# Patient Record
Sex: FEMALE | Race: WHITE | NOT HISPANIC OR LATINO | Employment: FULL TIME | URBAN - METROPOLITAN AREA
[De-identification: names, ages, dates, MRNs, and addresses within clinical notes are randomized per-mention and may not be internally consistent; named-entity substitution may affect disease eponyms.]

---

## 2017-01-23 ENCOUNTER — OFFICE VISIT (OUTPATIENT)
Dept: OBGYN | Facility: CLINIC | Age: 37
End: 2017-01-23
Payer: COMMERCIAL

## 2017-01-23 VITALS
SYSTOLIC BLOOD PRESSURE: 97 MMHG | HEART RATE: 82 BPM | HEIGHT: 63 IN | WEIGHT: 146.6 LBS | OXYGEN SATURATION: 99 % | BODY MASS INDEX: 25.98 KG/M2 | DIASTOLIC BLOOD PRESSURE: 64 MMHG

## 2017-01-23 DIAGNOSIS — N87.1 MODERATE DYSPLASIA OF CERVIX (CIN II): ICD-10-CM

## 2017-01-23 DIAGNOSIS — Z01.42: ICD-10-CM

## 2017-01-23 DIAGNOSIS — N94.6 DYSMENORRHEA: ICD-10-CM

## 2017-01-23 DIAGNOSIS — D25.9 UTERINE LEIOMYOMA, UNSPECIFIED LOCATION: ICD-10-CM

## 2017-01-23 DIAGNOSIS — Z01.411 ENCOUNTER FOR GYNECOLOGICAL EXAMINATION WITH ABNORMAL FINDING: Primary | ICD-10-CM

## 2017-01-23 DIAGNOSIS — N93.9 ABNORMAL UTERINE BLEEDING: ICD-10-CM

## 2017-01-23 PROCEDURE — 88175 CYTOPATH C/V AUTO FLUID REDO: CPT | Performed by: OBSTETRICS & GYNECOLOGY

## 2017-01-23 PROCEDURE — 99395 PREV VISIT EST AGE 18-39: CPT | Mod: 25 | Performed by: OBSTETRICS & GYNECOLOGY

## 2017-01-23 PROCEDURE — 58100 BIOPSY OF UTERUS LINING: CPT | Performed by: OBSTETRICS & GYNECOLOGY

## 2017-01-23 PROCEDURE — 88305 TISSUE EXAM BY PATHOLOGIST: CPT | Performed by: OBSTETRICS & GYNECOLOGY

## 2017-01-23 PROCEDURE — 87624 HPV HI-RISK TYP POOLED RSLT: CPT | Performed by: OBSTETRICS & GYNECOLOGY

## 2017-01-23 RX ORDER — NAPROXEN 500 MG/1
500 TABLET ORAL 2 TIMES DAILY PRN
Qty: 60 TABLET | Refills: 2 | Status: SHIPPED | OUTPATIENT
Start: 2017-01-23 | End: 2019-02-12

## 2017-01-23 NOTE — MR AVS SNAPSHOT
After Visit Summary   1/23/2017    Ethel Murillo    MRN: [id number]           Patient Information     Date Of Birth          1980        Visit Information        Provider Department      1/23/2017 8:30 AM Nico Cottrell MD [facility]        Today's Diagnoses     Encounter for gynecological examination with abnormal finding    -  1     Abnormal uterine bleeding         Uterine leiomyoma, unspecified location         Papanicolaou smear to confirm recent normal smear following initial abnormal smear         Moderate dysplasia of cervix (ROSAURA II)         Dysmenorrhea            Follow-ups after your visit        Who to contact     If you have questions or need follow up information about today's clinic visit or your schedule please contact [facility] directly at [phone].  Normal or non-critical lab and imaging results will be communicated to you by Soufunhart, letter or phone within 4 business days after the clinic has received the results. If you do not hear from us within 7 days, please contact the clinic through Soufunhart or phone. If you have a critical or abnormal lab result, we will notify you by phone as soon as possible.  Submit refill requests through Giveo or call your pharmacy and they will forward the refill request to us. Please allow 3 business days for your refill to be completed.          Additional Information About Your Visit        MyChart Information     Giveo gives you secure access to your electronic health record. If you see a primary care provider, you can also send messages to your care team and make appointments. If you have questions, please call your primary care clinic.  If you do not have a primary care provider, please call [phone] and they will assist you.        Care EveryWhere ID     This is your Care EveryWhere ID. This could be used by other organizations to access your [facility] medical records  [id number]        Your  "Vitals Were     Pulse Height BMI (Body Mass Index) Pulse Oximetry Last Period Breastfeeding?    82 5' 3.1\" (1.603 m) 25.88 kg/m2 99% 01/12/2017 No       Blood Pressure from Last 3 Encounters:   01/23/17 97/64   12/06/16 104/66   10/26/16 108/62    Weight from Last 3 Encounters:   01/23/17 146 lb 9.6 oz (66.497 kg)   12/06/16 145 lb (65.772 kg)   10/26/16 149 lb (67.586 kg)              We Performed the Following     ENDOMETRIAL BIOPSY W/O CERVICAL DILATION     HPV High Risk Types DNA Cervical     Pap imaged thin layer diagnostic with HPV (select HPV order below)     Surgical pathology exam          Where to get your medicines      These medications were sent to [id number] - [place], [place] - [address]  [address] [address]     Phone:  [phone]    - naproxen 500 MG tablet       Primary Care Provider Office Phone # Fax #    Nico Cottrell [phone]       [address] [address]        Thank you!     Thank you for choosing [facility]  for your care. Our goal is always to provide you with excellent care. Hearing back from our patients is one way we can continue to improve our services. Please take a few minutes to complete the written survey that you may receive in the mail after your visit with us. Thank you!             Your Updated Medication List - Protect others around you: Learn how to safely use, store and throw away your medicines at www.disposemymeds.org.          This list is accurate as of: 1/23/17  9:04 AM.  Always use your most recent med list.                   Brand Name Dispense Instructions for use    naproxen 500 MG tablet    NAPROSYN    60 tablet    Take 1 tablet (500 mg) by mouth 2 times daily as needed         "

## 2017-01-23 NOTE — PROGRESS NOTES
Ethel Murillo is a 36 year old female , who presents for annual exam.   No incontinence, or unusual discharge.   For the past several months, she has had some abnormal bleeding.  She will have her menses for 5 days and then 5 days of brownish discharge.  She has history of uterine leiomyoma.    They may consider [place]itro fertilization in [place] as the cost is too high here.  She might also address the fibroids in [place] also.    Last cholesterol:   Recent Labs   Lab Test  13   0757   CHOL  199   HDL  41*   LDL  141*   TRIG  87   CHOLHDLRATIO  4.9     Past Medical History   Diagnosis Date     Uterine fibroid 2012     LSIL (low grade squamous intraepithelial lesion) on Pap smear 2012     colp 2012 - ROSAURA 1/2     Eczema      of vulva     Cervical high risk HPV (human papillomavirus) test positive 2013, 2015     type 31, HR HPV     Infertility      H/O colposcopy with cervical biopsy 2015     Bx & ECC - Negative       Past Surgical History   Procedure Laterality Date     Myomectomy uterus  8/3/2011     2.5 cm/ 4.5 cm/ 6.5 and 7.5 cm  surgery performed in [place].       Laser co2 vulva  2011     small condyloma       Cryotherapy, cervical  2012       Obstetric History       T0      TAB0   SAB0   E0   M0   L0           Gyn History:  Gynecological History         Patient's last menstrual period was 2017.     STD HPV/no PID/no IUD      history of abnormal pap smear:  yes  Last pap: PAP      NIL   2016        Current Outpatient Prescriptions   Medication Sig Dispense Refill     naproxen (NAPROSYN) 500 MG tablet Take 1 tablet (500 mg) by mouth 2 times daily as needed 60 tablet 1       No Known Allergies    Social History     Social History     Marital Status:      Spouse Name: N/A     Number of Children: 0     Years of Education: N/A     Occupational History      Unemployed     Social History Main Topics     Smoking status: Never Smoker      Smokeless tobacco:  "Never Used     Alcohol Use: No     Drug Use: No     Sexual Activity:     Partners: Male     Other Topics Concern     Not on file     Social History Narrative       Family History   Problem Relation Age of Onset     Hypertension Mother      Asthma Mother      Hypertension Father          ROS:  All negative except as above.      EXAM:  BP 97/64 mmHg  Pulse 82  Ht 5' 3.1\" (1.603 m)  Wt 146 lb 9.6 oz (66.497 kg)  BMI 25.88 kg/m2  SpO2 99%  LMP 01/12/2017  Breastfeeding? No  General:  WNWD female, NAD  Alert  Oriented x 3  Gait:  Normal  Skin:  Normal skin turgor  Neurologic:  CN grossly intact, good sensation.    HEENT:  NC/AT, EOMI  Neck:  No masses palpated, symmetrical, carotids +2/4, no bruits heard  Heart:  RRR  Lungs:  Clear   Breasts:  Symmetrical, no dimpling noted, no masses palpated, no discharge expressed  Abdomen:  Non-tender, non-distended.  Vulva: No external lesions, normal hair distribution, no adenopathy  BUS:  Normal, no masses noted  Urethra:  No hypermobility noted  Urethral meatus:  No masses noted  Vagina: Moist, pink, no abnormal discharge, well rugated, no lesions  Cervix: Smooth, pink, no visible lesions  Uterus: 14 week size, irregular, non-tender, mobile  Ovaries: No mass, non-tender, mobile  Perianal:  No masses noted.   Extremities:  No clubbing, cyanosis, or edema      ASSESSMENT/PLAN   Annual examination   Uterine leiomyoma   Abnormal uterine bleeding:  Plan for EMB  History of ROSAURA II.  Last pap normal.  Repeat pap and HPV today.  If normal, then may return to routine screening.   Low fat diet, weight bearing exercises and self breast exams on a monthly basis have been recommended.  I have discussed with patient the risks, benefits, medications, treatment options and modalities.   I have instructed the patient to call or schedule a follow-up appointment if any problems.    Nico Cottrell MD        PROCEDURE NOTE:  The procedure was reviewed with patient.  After consenting to the " procedure she was placed into the dorsal lithotomy position.  The examination was performed.  The speculum was placed into the vagina.  The cervix was prepped with Betadine. The uterus was sounded to 10 cm.  The Pipelle was used to sample the endometrium.    Instruments were removed and the specimen was sent to pathology.  Results to the patient in 1-2 weeks when they are returned.    Nico Cottrell MD

## 2017-01-23 NOTE — NURSING NOTE
"Chief Complaint   Patient presents with     Physical     Annual Female       Initial BP 97/64 mmHg  Pulse 82  Ht 5' 3.1\" (1.603 m)  Wt 146 lb 9.6 oz (66.497 kg)  BMI 25.88 kg/m2  SpO2 99%  LMP 01/12/2017  Breastfeeding? No Estimated body mass index is 25.88 kg/(m^2) as calculated from the following:    Height as of this encounter: 5' 3.1\" (1.603 m).    Weight as of this encounter: 146 lb 9.6 oz (66.497 kg).  BP completed using cuff size: melinda Burr MA 1/23/2017         "

## 2017-01-24 LAB — COPATH REPORT: NORMAL

## 2017-01-25 LAB
COPATH REPORT: NORMAL
PAP: NORMAL

## 2017-01-26 LAB
FINAL DIAGNOSIS: NORMAL
HPV HR 12 DNA CVX QL NAA+PROBE: NEGATIVE
HPV16 DNA SPEC QL NAA+PROBE: NEGATIVE
HPV18 DNA SPEC QL NAA+PROBE: NEGATIVE
SPECIMEN DESCRIPTION: NORMAL

## 2017-06-14 ENCOUNTER — OFFICE VISIT (OUTPATIENT)
Dept: OBGYN | Facility: CLINIC | Age: 37
End: 2017-06-14
Payer: COMMERCIAL

## 2017-06-14 VITALS
BODY MASS INDEX: 24.97 KG/M2 | WEIGHT: 141.4 LBS | OXYGEN SATURATION: 100 % | SYSTOLIC BLOOD PRESSURE: 106 MMHG | HEART RATE: 85 BPM | DIASTOLIC BLOOD PRESSURE: 67 MMHG

## 2017-06-14 DIAGNOSIS — R14.0 ABDOMINAL BLOATING: ICD-10-CM

## 2017-06-14 DIAGNOSIS — D25.9 UTERINE LEIOMYOMA, UNSPECIFIED LOCATION: Primary | ICD-10-CM

## 2017-06-14 PROCEDURE — 99214 OFFICE O/P EST MOD 30 MIN: CPT | Performed by: OBSTETRICS & GYNECOLOGY

## 2017-06-14 NOTE — MR AVS SNAPSHOT
After Visit Summary   6/14/2017    Ethel Murillo    MRN: [id number]           Patient Information     Date Of Birth          1980        Visit Information        Provider Department      6/14/2017 1:00 PM Nico Cottrell MD [facility]        Today's Diagnoses     Uterine leiomyoma, unspecified location    -  1    Abdominal bloating           Follow-ups after your visit        Your next 10 appointments already scheduled     Jun 22, 2017  9:00 AM CDT   US PELVIC COMPLETE W TRANSVAGINAL with FKUS1   [facility] ([facility])    [address] [place] [id number] [phone]           Please bring a list of your medicines (including vitamins, minerals and over-the-counter drugs). Also, tell your doctor about any allergies you may have. Wear comfortable clothes and leave your valuables at home.  Adults: Drink six 8-ounce glasses of fluid one hour before your exam. Do NOT empty your bladder.  If you need to empty your bladder before your exam, try to release only a little bit of urine. Then, drink another 8oz glass of fluid.  Children: Children who are potty trained should drink at least 4 cups (32 oz) of liquid 45 minutes to one hour prior to the exam. The child s bladder must be full in order to achieve a diagnostic exam. If your child is very uncomfortable or has an urgent need to pee, please notify a technologist; they will try to find out how much longer the wait may be and provide instructions to help relieve the pressure. Occasionally it is medically necessary to insert a urinary catheter to fill the bladder.  Please call the Imaging Department at your exam site with any questions.            Jun 27, 2017  9:15 AM CDT   Office Visit with MD [facility] ([facility])    [address] [place] [id number] [phone]           Bring a current list of meds and any  records pertaining to this visit.  For Physicals, please bring immunization records and any forms needing to be filled out.  Please arrive 10 minutes early to complete paperwork.              Future tests that were ordered for you today     Open Future Orders        Priority Expected Expires Ordered    US Pelvic Complete with Transvaginal Routine  6/14/2018 6/14/2017            Who to contact     If you have questions or need follow up information about today's clinic visit or your schedule please contact [facility] directly at [phone].  Normal or non-critical lab and imaging results will be communicated to you by Easy Bill Onlinehart, letter or phone within 4 business days after the clinic has received the results. If you do not hear from us within 7 days, please contact the clinic through GENELINKt or phone. If you have a critical or abnormal lab result, we will notify you by phone as soon as possible.  Submit refill requests through [email or web address] or call your pharmacy and they will forward the refill request to us. Please allow 3 business days for your refill to be completed.          Additional Information About Your Visit        Women.com Information     [email or web address] gives you secure access to your electronic health record. If you see a primary care provider, you can also send messages to your care team and make appointments. If you have questions, please call your primary care clinic.  If you do not have a primary care provider, please call [phone] and they will assist you.        Care EveryWhere ID     This is your Care EveryWhere ID. This could be used by other organizations to access your [facility] medical records  [id number]        Your Vitals Were     Pulse Last Period Pulse Oximetry BMI (Body Mass Index)          85 05/23/2017 (Exact Date) 100% 24.97 kg/m2         Blood Pressure from Last 3 Encounters:   06/14/17 106/67   01/23/17 97/64   12/06/16 104/66    Weight from Last 3 Encounters:   06/14/17 141 lb  6.4 oz (64.1 kg)   01/23/17 146 lb 9.6 oz (66.5 kg)   12/06/16 145 lb (65.8 kg)               Primary Care Provider Office Phone # Fax #    Nico Girish Cottrell [phone]       [address]  [facility] [address]        Thank you!     Thank you for choosing [facility]  for your care. Our goal is always to provide you with excellent care. Hearing back from our patients is one way we can continue to improve our services. Please take a few minutes to complete the written survey that you may receive in the mail after your visit with us. Thank you!             Your Updated Medication List - Protect others around you: Learn how to safely use, store and throw away your medicines at www.disposemymeds.org.          This list is accurate as of: 6/14/17  1:53 PM.  Always use your most recent med list.                   Brand Name Dispense Instructions for use    naproxen 500 MG tablet    NAPROSYN    60 tablet    Take 1 tablet (500 mg) by mouth 2 times daily as needed

## 2017-06-14 NOTE — PROGRESS NOTES
Ethel Murillo is a 36 year old female , who presents today with known fibroids.  She reports that she has had 3-4 months of bloating sensation and abdominal firmness noted.  She states that she used to have the bloating only before her period.  She also notes that at night, her abdomen is firm and hard when she presses on the abdomen.  She has noted bladder sensation when she presses onto the firmness in the upper abdomen.  Previously she had some firmness that resolved when she had a BM, now it is consistently firm.  She states that 4 months ago she had intercourse and the next day she had bloating sensation and hard stomach.      We reviewed the past ultrasound report and the films:    PELVIC ULTRASOUND WITH ENDOVAGINAL TRANSDUCER IMAGING   2016 9:50 AM   HISTORY: Leiomyoma of uterus, unspecified.  TECHNIQUE:  Endovaginal sonography was added to the transabdominal exam to better evaluate the uterus and ovaries.  COMPARISON: Pelvic ultrasound 2014.  FINDINGS: Endometrium is 9 mm thick. Uterus measures 12.4 x 8.5 x 10.8 cm. Multiple uterine fibers again identified. These are difficult to correlate with the prior exam. Anterior right uterine fibroid is 4.5 x 4.8 x 3.9 cm. Anterior right uterine fibroid is 3.1 x 3.2 x 3.2 cm. Posterior left uterine fibroid is 4.8 x 4.9 x 4.9 cm. This fibroid is the largest of the measured fibroids, previously the largest fibroid in this region measured up to 4.5 cm. Anterior left uterine fibroid is 3.3 x 3.6 x 3.3 cm. The left ovary appears normal. The right ovary contains a 1.4 cm cystic lesion. There is trace pelvic fluid.  IMPRESSION:  1. Multiple uterine fibroids again identified. A posterior left uterine fibroid may be slightly larger compared to the prior study, and the other fibroids are not substantially changed.  2. 1.4 cm small cystic lesion in the right ovary.          Past Medical History:   Diagnosis Date     Cervical high risk HPV (human papillomavirus)  test positive 11/2013, 1/2015    type 31, HR HPV     Eczema     of vulva     H/O colposcopy with cervical biopsy 2/2015    Bx & ECC - Negative     Infertility      LSIL (low grade squamous intraepithelial lesion) on Pap smear 11/2012    colp 11/2012 - ROSAURA 1/2     Uterine fibroid 11/2012       Past Surgical History:   Procedure Laterality Date     CRYOTHERAPY, CERVICAL  12/2012     LASER CO2 VULVA  12/2011    small condyloma       MYOMECTOMY UTERUS  8/3/2011    2.5 cm/ 4.5 cm/ 6.5 and 7.5 cm  surgery performed in [place].          No Known Allergies      Current Outpatient Prescriptions   Medication Sig Dispense Refill     naproxen (NAPROSYN) 500 MG tablet Take 1 tablet (500 mg) by mouth 2 times daily as needed 60 tablet 2       Social History     Social History     Marital status:      Spouse name: N/A     Number of children: 0     Years of education: N/A     Occupational History      Unemployed     Social History Main Topics     Smoking status: Never Smoker     Smokeless tobacco: Never Used     Alcohol use No     Drug use: No     Sexual activity: Yes     Partners: Male     Other Topics Concern     Not on file     Social History Narrative       Family History   Problem Relation Age of Onset     Hypertension Mother      Asthma Mother      Hypertension Father        Review of Systems:  10 point ROS of systems including Constitutional, Eyes, Respiratory, Cardiovascular, Gastroenterology, Genitourinary, Integumentary, Muscularskeletal, Psychiatric were all negative except for pertinent positives noted in my HPI and in the PMH.      Exam  /67 (BP Location: Right arm, Cuff Size: Adult Regular)  Pulse 85  Wt 141 lb 6.4 oz (64.1 kg)  LMP 05/23/2017 (Exact Date)  SpO2 100%  BMI 24.97 kg/m2  General:  WNWD female, NAD  Alert  Oriented x 3  Gait:  Normal  Skin:  Normal skin turgor  HEENT:  NC/AT, EOMI  Abdomen:  Non-tender, non-distended.  Vulva: No external lesions, normal hair distribution, no  adenopathy  BUS:  Normal, no masses noted  Urethra:  No hypermobility seen  Urethral meatus:  No masses noted  Vagina: Moist, pink, no abnormal discharge, well rugated, no lesions  Cervix: Smooth, pink, no visible lesions  Uterus: 18-20 week size, irregular, non-tender, mobile  Ovaries: Not palpable due to the uterine size.   Perianal:  No masses noted.  External hemorrhoid seen.  Extremities:  No clubbing, no cyanosis and no edema.      Assessment  Uterine leiomyoma  Bloating sensation       Plan  She has a history of uterine leiomyoma and also prior surgery for these.  When I saw her for her annual exam she had a uterine size of about 14 weeks.  At this appointment, the size has clearly increased.    The uterine size is likely related to the fibroids.  However, with the bloating sensation now occurring through out the cycle, it would be best to attempt to rule out other causes, such as ovarian issues.  With the size of the uterus and fibroids, the ultrasound might not be able to visualize the ovaries completely.    We reviewed the CA-125 and the goals and limitations of the blood test.  It is not a screening test for ovarian cancer, and that there are currently no good screening tests for ovarian cancer.  Discussed that many different conditions can elevate CA-125 including but not limited to colon or liver pathology or thyroid disease. A  result might be difficult to interpret due to the known fibroids and that fibroids may cause an elevation in the . Discussed that some ovarian cancers do not have an elevated CA-125.  Reviewed signs/symptoms of ovary cancer, although they are often vague, can be bloating, pressure, or bladder/bowel changes.  Patient voiced understanding.  Will obtain an ultrasound for further initial evaluation.    Return to clinic for further discussion after the ultrasound.     Nico Cottrell MD

## 2017-06-22 ENCOUNTER — RADIANT APPOINTMENT (OUTPATIENT)
Dept: ULTRASOUND IMAGING | Facility: CLINIC | Age: 37
End: 2017-06-22
Attending: OBSTETRICS & GYNECOLOGY
Payer: COMMERCIAL

## 2017-06-22 DIAGNOSIS — D25.9 UTERINE LEIOMYOMA, UNSPECIFIED LOCATION: ICD-10-CM

## 2017-06-22 DIAGNOSIS — R14.0 ABDOMINAL BLOATING: ICD-10-CM

## 2017-06-22 PROCEDURE — 76830 TRANSVAGINAL US NON-OB: CPT

## 2017-06-22 PROCEDURE — 76856 US EXAM PELVIC COMPLETE: CPT

## 2017-06-27 ENCOUNTER — TELEPHONE (OUTPATIENT)
Dept: OBGYN | Facility: CLINIC | Age: 37
End: 2017-06-27

## 2017-06-27 ENCOUNTER — OFFICE VISIT (OUTPATIENT)
Dept: OBGYN | Facility: CLINIC | Age: 37
End: 2017-06-27
Payer: COMMERCIAL

## 2017-06-27 VITALS
WEIGHT: 143.6 LBS | OXYGEN SATURATION: 100 % | DIASTOLIC BLOOD PRESSURE: 65 MMHG | BODY MASS INDEX: 25.36 KG/M2 | HEART RATE: 76 BPM | SYSTOLIC BLOOD PRESSURE: 104 MMHG

## 2017-06-27 DIAGNOSIS — D25.2 INTRAMURAL, SUBMUCOUS, AND SUBSEROUS LEIOMYOMA OF UTERUS: Primary | ICD-10-CM

## 2017-06-27 DIAGNOSIS — D25.1 INTRAMURAL, SUBMUCOUS, AND SUBSEROUS LEIOMYOMA OF UTERUS: Primary | ICD-10-CM

## 2017-06-27 DIAGNOSIS — D25.0 INTRAMURAL, SUBMUCOUS, AND SUBSEROUS LEIOMYOMA OF UTERUS: Primary | ICD-10-CM

## 2017-06-27 PROCEDURE — 99214 OFFICE O/P EST MOD 30 MIN: CPT | Performed by: OBSTETRICS & GYNECOLOGY

## 2017-06-27 NOTE — TELEPHONE ENCOUNTER
Surgery Pre-Certification    Medical Record Number: [id number]  Ethel Murillo  YOB: 1980   Phone: [phone] (home)   Primary Provider: Nico Cottrell    Reason for Admit:  Surgery admit     Surgeon: ELDA Cottrell MD  Surgical Procedure: Myomectomy  ICD-9 Coded: N94.6,D25.9  Date of Surgery: 7/14/17  Consent signed? No    Date signed:   Hospital: [facility]  Inpatient- Length of stay:  2 days.    Requestor:  Martha Alarcon     Location:  [facility] [phone]    Surgery packet given to patient. She will schedule pre op physical. 2 week post op scheduled.  Pre cert done surgery form e-mailed

## 2017-06-27 NOTE — PROGRESS NOTES
Ethel is a 36 year old  here for follow up of ultrasound.  She has a history of uterine leiomyoma and also infertility.  She had been planning to return to [facility] for further infertility management (cost is less).   Last month I saw patient for abdominal distention and she was concerned about the uterine leiomyoma enlarging.  She had an 18-20 week size uterus on the exam.  She had abnormal bleeding previously and the EMB in 2017, and disordered proliferative endometrium was noted.     The patient and I reviewed the following ultrasound report and we reviewed the ultrasound films.  We compared these with the ultrasound exam in 2016.     ULTRASOUND PELVIC COMPLETE WITH TRANSVAGINAL 2017 9:37 AM   HISTORY: Leiomyoma of uterus, unspecified. Abdominal distension (gaseous).   FINDINGS: Transvaginal images were performed to better evaluate the patient's uterus, ovaries and endometrial stripe.  The uterus is enlarged and lobulated measuring 17.2 x 8.4 x 14.2 cm. Numerous uterine fibroids are present. The largest is in the anterior left uterine body measuring 9.2 x 6.7 x 6.4 cm. Another is in the lower uterine segment possibly in the region of the cephalad portion  of the cervix measuring 5.2 x 4.6 x 3.6 cm. Many of these fibroids appear to be submucosal in location and distort the endometrium. Endometrial stripe measures 14 mm and is thickened for patient's age.  The right ovary is not visualized. The left ovary is not visualized.  No adnexal masses are present. No free pelvic fluid is present.  IMPRESSION: Multiple uterine fibroids distorting the endometrial stripe which is mildly thickened. This endometrial thickening may be related to the multiple submucosal fibroids.        Past Medical History:   Diagnosis Date     Cervical high risk HPV (human papillomavirus) test positive 2013, 2015    type 31, HR HPV     Eczema     of vulva     H/O colposcopy with cervical biopsy 2015    Bx & ECC - Negative      Infertility      LSIL (low grade squamous intraepithelial lesion) on Pap smear 11/2012    colp 11/2012 - ROSAURA 1/2     Uterine fibroid 11/2012       Past Surgical History:   Procedure Laterality Date     CRYOTHERAPY, CERVICAL  12/2012     LASER CO2 VULVA  12/2011    small condyloma       MYOMECTOMY UTERUS  8/3/2011    2.5 cm/ 4.5 cm/ 6.5 and 7.5 cm  surgery performed in [place].          No Known Allergies    Current Outpatient Prescriptions   Medication Sig Dispense Refill     naproxen (NAPROSYN) 500 MG tablet Take 1 tablet (500 mg) by mouth 2 times daily as needed (Patient not taking: Reported on 6/27/2017) 60 tablet 2       Social History     Social History     Marital status:      Spouse name: N/A     Number of children: 0     Years of education: N/A     Occupational History      Unemployed     Social History Main Topics     Smoking status: Never Smoker     Smokeless tobacco: Never Used     Alcohol use No     Drug use: No     Sexual activity: Yes     Partners: Male     Other Topics Concern     Not on file     Social History Narrative       Family History   Problem Relation Age of Onset     Hypertension Mother      Asthma Mother      Hypertension Father        Review of Systems:  10 point ROS of systems including Constitutional, Eyes, Respiratory, Cardiovascular, Gastroenterology, Genitourinary, Integumentary, Muscularskeletal, Psychiatric were all negative except for pertinent positives noted in my HPI and in the PMH.      Exam  /65 (BP Location: Right arm, Cuff Size: Adult Regular)  Pulse 76  Wt 143 lb 9.6 oz (65.1 kg)  LMP 06/18/2017 (Exact Date)  SpO2 100%  Breastfeeding? No  BMI 25.36 kg/m2  General:  WNWD female, NAD  Alert  Oriented x 3  Gait:  Normal  Skin:  Normal skin turgor  HEENT:  NC/AT, EOMI  Lungs:  Good respiratory effort   Pelvic exam:  Not performed today.   Extremities:  No clubbing, no cyanosis and no edema.      Assessment  Uterine leiomyoma, symptomatic       Plan  The  patient and I reviewed the options.  She would like to conserve the uterus for pregnancy, if possible.  We discussed the surgical approaches.  I do not do the robotic surgeries and if she would like to have that, a referral will be placed.  Due to the size of the uterus and the number of fibroids, the uterus might not be able to be placed back together.  She voices that her surgeon in [facility] had not removed all the fibroids as she would have had difficulty with conserving the uterus.    We reviewed the use of Lupron and Zoladex prior to the myomectomy and the goals and limitations.    Future pregnancy and IVF discussed.  MFM here would like to have 2 years between pregnancies or uterine surgeries.  This is limited for her however, as she has the advancing age to consider.    Questions seem to be answered..     TT 30 min  CT greater than 50%    Nico Cottrell MD

## 2017-06-27 NOTE — NURSING NOTE
"Chief Complaint   Patient presents with     RECHECK     follow up after US       Initial /65 (BP Location: Right arm, Cuff Size: Adult Regular)  Pulse 76  Wt 143 lb 9.6 oz (65.1 kg)  LMP 06/18/2017 (Exact Date)  SpO2 100%  Breastfeeding? No  BMI 25.36 kg/m2 Estimated body mass index is 25.36 kg/(m^2) as calculated from the following:    Height as of 1/23/17: 5' 3.1\" (1.603 m).    Weight as of this encounter: 143 lb 9.6 oz (65.1 kg).  Medication Reconciliation: complete   JUANJOSE Burr 6/27/2017         "

## 2017-06-29 ENCOUNTER — TELEPHONE (OUTPATIENT)
Dept: OBGYN | Facility: CLINIC | Age: 37
End: 2017-06-29

## 2017-06-29 NOTE — TELEPHONE ENCOUNTER
Alma Rosa from [facility] surgery scheduling called stating patient was scheduled to have surgery on 07-14-17 at 1500 and wanted to move surgery up to an earlier time.   Message handled by Nurse Triage with [facility] - provider name: Dr. Cottrell.  Move surgery up to 1200  Return call to Alma Rosa and explain Dr. Cottrell would like surgery moved up to 1200. Scheduled changed at [facility].   Updated Dr. Cottrell's calendar. Blocked Dr. Cottrell's clinic schedule per his advise. Emailed to alert staffing changes.     Phone call to patient. Explained change in surgery time. Patient agreed to change. Advised NPO after [place] and arrive at [facility] at 1030. Patient agreed to follow plan. Montse Watkins RN, BAN

## 2017-07-05 ENCOUNTER — TELEPHONE (OUTPATIENT)
Dept: OBGYN | Facility: CLINIC | Age: 37
End: 2017-07-05

## 2017-07-05 NOTE — TELEPHONE ENCOUNTER
"Return call to patient. She stated she wants to try a pill \"for both good or bad tumors\" that she will get from her [facility] in [facility]. Patient stated the medicine is made from the root of a plant and mixed with honey. Patient stated it is recommened she take one tab for 15 days. The Latin name of that plant is arum sairaulatum. She stated she is waiting for shipment. Patient apologized for cancelling surgery and appts.     Will route to Dr. Cottrell prior to cancelling appts and surgery. Montse Watkins RN, BAN      "

## 2017-07-05 NOTE — TELEPHONE ENCOUNTER
Reason for Call:  Other appointment    Detailed comments:  Patient calling. She received a medication from [place] that she wants to try first for a while. Please cancel her appointments and surgery for now. She will call back to reschedule if needed.     Phone Number Patient can be reached at: Home number on file [phone] (home)    Best Time:   Any     Can we leave a detailed message on this number? YES    Call taken on 7/5/2017 at 10:52 AM by Salina Mayers

## 2017-07-06 NOTE — TELEPHONE ENCOUNTER
"Noted patient gave permission to leave a message.   Left a detailed message that clinic has cancelled her appts and surgery as requested. Explained that Dr. Cottrell does not recommend she take the medicine as he researched it and \"It appears all parts of the plant is poisonous.\" Recommended she call back to clinic if we can assist her further. Montse Watkins RN, BAN      "

## 2017-11-28 ENCOUNTER — OFFICE VISIT (OUTPATIENT)
Dept: INTERNAL MEDICINE | Facility: CLINIC | Age: 37
End: 2017-11-28
Payer: COMMERCIAL

## 2017-11-28 ENCOUNTER — TELEPHONE (OUTPATIENT)
Dept: INTERNAL MEDICINE | Facility: CLINIC | Age: 37
End: 2017-11-28

## 2017-11-28 ENCOUNTER — RADIANT APPOINTMENT (OUTPATIENT)
Dept: GENERAL RADIOLOGY | Facility: CLINIC | Age: 37
End: 2017-11-28
Attending: INTERNAL MEDICINE
Payer: COMMERCIAL

## 2017-11-28 VITALS
HEIGHT: 63 IN | TEMPERATURE: 97.9 F | OXYGEN SATURATION: 97 % | HEART RATE: 90 BPM | DIASTOLIC BLOOD PRESSURE: 60 MMHG | WEIGHT: 140 LBS | BODY MASS INDEX: 24.8 KG/M2 | SYSTOLIC BLOOD PRESSURE: 108 MMHG

## 2017-11-28 DIAGNOSIS — R05.9 COUGH IN ADULT PATIENT: ICD-10-CM

## 2017-11-28 DIAGNOSIS — Z23 NEED FOR PROPHYLACTIC VACCINATION AND INOCULATION AGAINST INFLUENZA: ICD-10-CM

## 2017-11-28 DIAGNOSIS — D50.0 IRON DEFICIENCY ANEMIA DUE TO CHRONIC BLOOD LOSS: Primary | ICD-10-CM

## 2017-11-28 DIAGNOSIS — D50.0 IRON DEFICIENCY ANEMIA DUE TO CHRONIC BLOOD LOSS: ICD-10-CM

## 2017-11-28 DIAGNOSIS — Z00.00 ROUTINE GENERAL MEDICAL EXAMINATION AT A HEALTH CARE FACILITY: Primary | ICD-10-CM

## 2017-11-28 DIAGNOSIS — Z13.6 CARDIOVASCULAR SCREENING; LDL GOAL LESS THAN 160: ICD-10-CM

## 2017-11-28 DIAGNOSIS — Z23 NEED FOR PROPHYLACTIC VACCINATION WITH TETANUS-DIPHTHERIA (TD): ICD-10-CM

## 2017-11-28 LAB
ALBUMIN SERPL-MCNC: 3.7 G/DL (ref 3.4–5)
ALP SERPL-CCNC: 53 U/L (ref 40–150)
ALT SERPL W P-5'-P-CCNC: 28 U/L (ref 0–50)
ANION GAP SERPL CALCULATED.3IONS-SCNC: 5 MMOL/L (ref 3–14)
AST SERPL W P-5'-P-CCNC: 19 U/L (ref 0–45)
BASOPHILS # BLD AUTO: 0 10E9/L (ref 0–0.2)
BASOPHILS NFR BLD AUTO: 0.2 %
BILIRUB SERPL-MCNC: 0.6 MG/DL (ref 0.2–1.3)
BUN SERPL-MCNC: 11 MG/DL (ref 7–30)
CALCIUM SERPL-MCNC: 8.4 MG/DL (ref 8.5–10.1)
CHLORIDE SERPL-SCNC: 107 MMOL/L (ref 94–109)
CHOLEST SERPL-MCNC: 181 MG/DL
CO2 SERPL-SCNC: 27 MMOL/L (ref 20–32)
CREAT SERPL-MCNC: 0.61 MG/DL (ref 0.52–1.04)
CRP SERPL-MCNC: 11.2 MG/L (ref 0–8)
DIFFERENTIAL METHOD BLD: ABNORMAL
EOSINOPHIL # BLD AUTO: 0.1 10E9/L (ref 0–0.7)
EOSINOPHIL NFR BLD AUTO: 2.3 %
ERYTHROCYTE [DISTWIDTH] IN BLOOD BY AUTOMATED COUNT: 19.5 % (ref 10–15)
ERYTHROCYTE [SEDIMENTATION RATE] IN BLOOD BY WESTERGREN METHOD: 65 MM/H (ref 0–20)
GFR SERPL CREATININE-BSD FRML MDRD: >90 ML/MIN/1.7M2
GLUCOSE SERPL-MCNC: 83 MG/DL (ref 70–99)
HCT VFR BLD AUTO: 24.1 % (ref 35–47)
HDLC SERPL-MCNC: 49 MG/DL
HGB BLD-MCNC: 6.8 G/DL (ref 11.7–15.7)
LDLC SERPL CALC-MCNC: 119 MG/DL
LYMPHOCYTES # BLD AUTO: 0.8 10E9/L (ref 0.8–5.3)
LYMPHOCYTES NFR BLD AUTO: 17.5 %
MCH RBC QN AUTO: 20.1 PG (ref 26.5–33)
MCHC RBC AUTO-ENTMCNC: 28.2 G/DL (ref 31.5–36.5)
MCV RBC AUTO: 71 FL (ref 78–100)
MONOCYTES # BLD AUTO: 0.7 10E9/L (ref 0–1.3)
MONOCYTES NFR BLD AUTO: 14.7 %
NEUTROPHILS # BLD AUTO: 3.1 10E9/L (ref 1.6–8.3)
NEUTROPHILS NFR BLD AUTO: 65.3 %
NONHDLC SERPL-MCNC: 132 MG/DL
PLATELET # BLD AUTO: 408 10E9/L (ref 150–450)
POTASSIUM SERPL-SCNC: 4.1 MMOL/L (ref 3.4–5.3)
PROT SERPL-MCNC: 8 G/DL (ref 6.8–8.8)
RBC # BLD AUTO: 3.38 10E12/L (ref 3.8–5.2)
SODIUM SERPL-SCNC: 139 MMOL/L (ref 133–144)
TRIGL SERPL-MCNC: 64 MG/DL
WBC # BLD AUTO: 4.7 10E9/L (ref 4–11)

## 2017-11-28 PROCEDURE — 80053 COMPREHEN METABOLIC PANEL: CPT | Performed by: INTERNAL MEDICINE

## 2017-11-28 PROCEDURE — 99213 OFFICE O/P EST LOW 20 MIN: CPT | Mod: 25 | Performed by: INTERNAL MEDICINE

## 2017-11-28 PROCEDURE — 36415 COLL VENOUS BLD VENIPUNCTURE: CPT | Performed by: INTERNAL MEDICINE

## 2017-11-28 PROCEDURE — 80061 LIPID PANEL: CPT | Performed by: INTERNAL MEDICINE

## 2017-11-28 PROCEDURE — 85652 RBC SED RATE AUTOMATED: CPT | Performed by: INTERNAL MEDICINE

## 2017-11-28 PROCEDURE — 71020 XR CHEST 2 VW: CPT

## 2017-11-28 PROCEDURE — 85025 COMPLETE CBC W/AUTO DIFF WBC: CPT | Performed by: INTERNAL MEDICINE

## 2017-11-28 PROCEDURE — 99395 PREV VISIT EST AGE 18-39: CPT | Performed by: INTERNAL MEDICINE

## 2017-11-28 PROCEDURE — 86140 C-REACTIVE PROTEIN: CPT | Performed by: INTERNAL MEDICINE

## 2017-11-28 RX ORDER — METHYLPREDNISOLONE 4 MG
TABLET, DOSE PACK ORAL
Qty: 21 TABLET | Refills: 0 | Status: SHIPPED | OUTPATIENT
Start: 2017-11-28 | End: 2018-02-13

## 2017-11-28 ASSESSMENT — PAIN SCALES - GENERAL: PAINLEVEL: NO PAIN (0)

## 2017-11-28 NOTE — TELEPHONE ENCOUNTER
Patient called back and was updated on results  She handed the phone to her  to be updated on the same  She agreed to get blood transfusion.  Orders singed     Will keep encounter open in case orders are not placed correctly and [facility] infusion center calls back to get clarification    Carmelo Aaron RN

## 2017-11-28 NOTE — NURSING NOTE
"Chief Complaint   Patient presents with     Physical     Health Maintenance     Tetnanus     Cough     x2 month after cold, dry cough with almost vomiting        Initial /60  Pulse 90  Temp 97.9  F (36.6  C) (Oral)  Ht 5' 3.1\" (1.603 m)  Wt 140 lb (63.5 kg)  LMP 11/21/2017  SpO2 97%  BMI 24.72 kg/m2 Estimated body mass index is 24.72 kg/(m^2) as calculated from the following:    Height as of this encounter: 5' 3.1\" (1.603 m).    Weight as of this encounter: 140 lb (63.5 kg).  Medication Reconciliation: complete     Domenica Brooks MA       "

## 2017-11-28 NOTE — MR AVS SNAPSHOT
After Visit Summary   11/28/2017    Ethel Murillo    MRN: [id number]           Patient Information     Date Of Birth          1980        Visit Information        Provider Department      11/28/2017 8:50 AM Cedric Patel MD [facility]        Today's Diagnoses     Routine general medical examination at a health care facility    -  1    Need for prophylactic vaccination and inoculation against influenza        Need for prophylactic vaccination with tetanus-diphtheria (TD)        Cough in adult patient        CARDIOVASCULAR SCREENING; LDL GOAL LESS THAN 160          Care Instructions    - I will follow up with you about lab and imaging results.     - If coughing does not resolve with this treatment, let me know.    [facility]    If you have any questions regarding to your visit please contact your care team:     Team Pink:   Clinic Hours Telephone Number   Internal Medicine:  Dr. Clarisse Webb NP       7am-7pm  Monday - Thursday   7am-5pm  Fridays  [phone]  (Appointment scheduling available 24/7)    Questions about your visit?  Team Line  [phone]   Urgent Care - [place] - 11am-9pm Monday-Friday Saturday-Sunday- 9am-5pm   [place] - 5pm-9pm Monday-Friday Saturday-Sunday- 9am-5pm  [phone] - [facility]   [phone] - [place]       What options do I have for visits at the clinic other than the traditional office visit?  To expand how we care for you, many of our providers are utilizing electronic visits (e-visits) and telephone visits, when medically appropriate, for interactions with their patients rather than a visit in the clinic.   We also offer nurse visits for many medical concerns. Just like any other service, we will bill your insurance company for this type of visit based on time spent on the phone with your provider. Not all insurance companies cover these visits. Please check  with your medical insurance if this type of visit is covered. You will be responsible for any charges that are not paid by your insurance.      E-visits via [employer]:  generally incur a $35.00 fee.  Telephone visits:  Time spent on the phone: *charged based on time that is spent on the phone in increments of 10 minutes. Estimated cost:   5-10 mins $30.00   11-20 mins. $59.00   21-30 mins. $85.00   Use [employer] (secure email communication and access to your chart) to send your primary care provider a message or make an appointment. Ask someone on your Team how to sign up for [employer].    For a Price Quote for your services, please call our [place] Line at [phone].    As always, Thank you for trusting us with your health care needs!    Discharged by Ely NGUYEN CMA ([facility])            Follow-ups after your visit        Who to contact     If you have questions or need follow up information about today's clinic visit or your schedule please contact [facility] directly at [phone].  Normal or non-critical lab and imaging results will be communicated to you by [employer], letter or phone within 4 business days after the clinic has received the results. If you do not hear from us within 7 days, please contact the clinic through [employer] or phone. If you have a critical or abnormal lab result, we will notify you by phone as soon as possible.  Submit refill requests through [employer] or call your pharmacy and they will forward the refill request to us. Please allow 3 business days for your refill to be completed.          Additional Information About Your Visit        [employer] Information     [employer] gives you secure access to your electronic health record. If you see a primary care provider, you can also send messages to your care team and make appointments. If you have questions, please call your primary care clinic.  If you do not have a primary care provider, please call [phone] and they will assist  "you.        Care EveryWhere ID     This is your Care EveryWhere ID. This could be used by other organizations to access your [facility] medical records  [id number]        Your Vitals Were     Pulse Temperature Height Last Period Pulse Oximetry BMI (Body Mass Index)    90 97.9  F (36.6  C) (Oral) 5' 3.1\" (1.603 m) 11/21/2017 97% 24.72 kg/m2       Blood Pressure from Last 3 Encounters:   11/28/17 108/60   06/27/17 104/65   06/14/17 106/67    Weight from Last 3 Encounters:   11/28/17 140 lb (63.5 kg)   06/27/17 143 lb 9.6 oz (65.1 kg)   06/14/17 141 lb 6.4 oz (64.1 kg)              We Performed the Following     CBC with platelets differential     Comprehensive metabolic panel     CRP inflammation     Erythrocyte sedimentation rate auto     Lipid panel reflex to direct LDL Fasting          Today's Medication Changes          These changes are accurate as of: 11/28/17 10:00 AM.  If you have any questions, ask your nurse or doctor.               Start taking these medicines.        Dose/Directions    methylPREDNISolone 4 MG tablet   Commonly known as:  MEDROL DOSEPAK   Used for:  Cough in adult patient   Started by:  Cedric Patel MD        Follow package instructions   Quantity:  21 tablet   Refills:  0            Where to get your medicines      These medications were sent to [id number] - [place], [place] - [address]  [address] [address]     Phone:  [phone]     methylPREDNISolone 4 MG tablet                Primary Care Provider Office Phone # Fax #    Nico Girish Cottrell [phone] [address] [address]        Equal Access to Services     KAREEN WHARTON AH: Hadrodney Bhardwaj, waaxda luqadaha, qaybta kaalmagloria garcia. So [facility] [phone].    ATENCIÓN: Si habla español, tiene a howe disposición servicios gratuitos de asistencia lingüística. Llame al [phone].    We comply " with applicable federal civil rights laws and [place] laws. We do not discriminate on the basis of race, color, national origin, age, disability, sex, sexual orientation, or gender identity.            Thank you!     Thank you for choosing [facility]  for your care. Our goal is always to provide you with excellent care. Hearing back from our patients is one way we can continue to improve our services. Please take a few minutes to complete the written survey that you may receive in the mail after your visit with us. Thank you!             Your Updated Medication List - Protect others around you: Learn how to safely use, store and throw away your medicines at www.disposemymeds.org.          This list is accurate as of: 11/28/17 10:00 AM.  Always use your most recent med list.                   Brand Name Dispense Instructions for use Diagnosis    methylPREDNISolone 4 MG tablet    MEDROL DOSEPAK    21 tablet    Follow package instructions    Cough in adult patient       naproxen 500 MG tablet    NAPROSYN    60 tablet    Take 1 tablet (500 mg) by mouth 2 times daily as needed    Dysmenorrhea

## 2017-11-28 NOTE — TELEPHONE ENCOUNTER
See other telephone encounter from today.  I am currently working on trying to get her scheduled some where. Jennifer Lei,

## 2017-11-28 NOTE — PATIENT INSTRUCTIONS
- I will follow up with you about lab and imaging results.     - If coughing does not resolve with this treatment, let me know.    [facility]    If you have any questions regarding to your visit please contact your care team:     Team Pink:   Clinic Hours Telephone Number   Internal Medicine:  Dr. Clarisse Webb, NP       7am-7pm  Monday - Thursday   7am-5pm  Fridays  [phone]  (Appointment scheduling available 24/7)    Questions about your visit?  Team Line  [phone]   Urgent Care - [place] and [place] - 11am-9pm Monday-Friday Saturday-Sunday- 9am-5pm   [place] - 5pm-9pm Monday-Friday Saturday-Sunday- 9am-5pm  [phone] - [facility]   [phone] - [place]       What options do I have for visits at the clinic other than the traditional office visit?  To expand how we care for you, many of our providers are utilizing electronic visits (e-visits) and telephone visits, when medically appropriate, for interactions with their patients rather than a visit in the clinic.   We also offer nurse visits for many medical concerns. Just like any other service, we will bill your insurance company for this type of visit based on time spent on the phone with your provider. Not all insurance companies cover these visits. Please check with your medical insurance if this type of visit is covered. You will be responsible for any charges that are not paid by your insurance.      E-visits via [employer]:  generally incur a $35.00 fee.  Telephone visits:  Time spent on the phone: *charged based on time that is spent on the phone in increments of 10 minutes. Estimated cost:   5-10 mins $30.00   11-20 mins. $59.00   21-30 mins. $85.00   Use rumr: turn off the lightst (secure email communication and access to your chart) to send your primary care provider a message or make an appointment. Ask someone on your Team how to sign up for [employer].    For a Price Quote for your  services, please call our Consumer Price Line at [phone].    As always, Thank you for trusting us with your health care needs!    Discharged by Ely NGUYEN CMA ([facility])

## 2017-11-28 NOTE — TELEPHONE ENCOUNTER
Reason for Call:  Other Patient in clinic    Detailed comments: Patient waiting in clinic to find out where she needs to go for her blood transfusion. Lynche sent to the RN group for more information.     Phone Number Patient can be reached at: Home number on file [phone] (home)    Best Time: any    Can we leave a detailed message on this number? Not Applicable    Call taken on 11/28/2017 at 1:18 PM by PADMAJA MCFADDEN

## 2017-11-28 NOTE — TELEPHONE ENCOUNTER
Huddled with DR. aPtel regarding critical lab. Hgb of 6.8.  Per Dr. Patel patient will need 2 units of RBC blood transfusion scheduled with infusion center asap    Orders virgil'd up in Blood product plan by Dr Patel (under therapy plan section of phone encounter)  LM on [facility] infusion center's nurse's line requesting a call back  Are the orders virgil'd up correct?    Left message on voicemail for patient to return call to RN hotline at # [phone]  Need to update her on low hgb and the need for blood transfusions asap.  Need to get verbal consent to add to the orders before signing    Carmelo Aaron RN

## 2017-11-28 NOTE — PROGRESS NOTES
"   SUBJECTIVE:   CC: Ethel Murillo is an 37 year old woman who presents for preventive health visit.     Cough -- Patient states she had a cold about 2 months ago and a cough has persisted since then. The cough has been mild but maybe getting worse. Yesterday she had nighttime diaphoresis and nearly vomited from coughing. She also feels fatigued. Notes she had bronchitis for the first time last year. Symptoms are provoked by cold air. Last year she had a similar problem and was treated with 15 days of an \"Allergy medication\" which helped. She tried the same product this year but it did not work. Denies fevers, chills, and SOB. Denies history of pneumonia or tobacco use. A detailed review of her past medical history is entirely within normal limits , current with pap and pelvic examinations.    Physical   Annual:     Getting at least 3 servings of Calcium per day::  Yes    Bi-annual eye exam::  Yes    Dental care twice a year::  Yes    Sleep apnea or symptoms of sleep apnea::  None    Diet::  Regular (no restrictions)    Frequency of exercise::  None    Taking medications regularly::  Yes    Medication side effects::  None    Additional concerns today::  No    Today's PHQ-2 Score:   PHQ-2 ( 1999 Pfizer) 11/28/2017   Q1: Little interest or pleasure in doing things 0   Q2: Feeling down, depressed or hopeless 0   PHQ-2 Score 0   Q1: Little interest or pleasure in doing things Not at all   Q2: Feeling down, depressed or hopeless Not at all   PHQ-2 Score 0     Abuse: Current or Past(Physical, Sexual or Emotional)- No  Do you feel safe in your environment - Yes    Social History   Substance Use Topics     Smoking status: Never Smoker     Smokeless tobacco: Never Used     Alcohol use No     The patient does not drink >3 drinks per day nor >7 drinks per week.    Reviewed orders with patient.  Reviewed health maintenance and updated orders accordingly - Yes  Labs reviewed in EPIC    Mammogram not appropriate for this patient " "based on age.    Pertinent mammograms are reviewed under the imaging tab.  History of abnormal Pap smear: NO - age 30- 65 PAP every 3 years recommended    Reviewed and updated as needed this visit by clinical staff  Tobacco  Allergies  Meds  Med Hx  Surg Hx  Fam Hx  Soc Hx        Reviewed and updated as needed this visit by Provider        Past Medical History:   Diagnosis Date     Cervical high risk HPV (human papillomavirus) test positive 11/2013, 1/2015    type 31, HR HPV     Eczema     of vulva     H/O colposcopy with cervical biopsy 2/2015    Bx & ECC - Negative     Infertility      LSIL (low grade squamous intraepithelial lesion) on Pap smear 11/2012    colp 11/2012 - ROSAURA 1/2     Uterine fibroid 11/2012        Review of Systems  Constitutional, HEENT, cardiovascular, pulmonary, GI, , musculoskeletal, neuro, skin, endocrine and psych systems are negative, except as in HPI or otherwise noted     This document serves as a record of the services and decisions personally performed and made by Cedric Patel MD. It was created on their behalf by Marko Driscoll, a trained medical scribe. The creation of this document is based the provider's statements to the medical scribe.  Marko Driscoll November 28, 2017 9:10 AM    OBJECTIVE:   /60  Pulse 90  Temp 97.9  F (36.6  C) (Oral)  Ht 1.603 m (5' 3.1\")  Wt 63.5 kg (140 lb)  LMP 11/21/2017  SpO2 97%  BMI 24.72 kg/m2  Physical Exam  GENERAL: pale, but alert and no distress  EYES: Eyes grossly normal to inspection, EOMI, conjunctivae and sclerae normal  HENT: ear canals and TM's normal, nose and mouth without ulcers or lesions  NECK: no adenopathy, no asymmetry, masses, or scars and thyroid normal to palpation  RESP: lungs clear to auscultation - no rales, rhonchi or wheezes, good air movement  BREAST: not examined  CV: regular rate and rhythm, normal S1 S2, no S3 or S4, no murmur, click or rub, no peripheral edema and peripheral pulses strong  ABDOMEN: " soft, nontender, no hepatosplenomegaly, no masses and bowel sounds normal   (female): exam not performed   MS: no gross musculoskeletal defects noted, no edema  SKIN: no suspicious lesions or rashes to visible skin   NEURO: mentation intact and speech normal  PSYCH: mentation appears normal, affect normal/bright    ASSESSMENT/PLAN:   (Z00.00) Routine general medical examination at a health care facility  (primary encounter diagnosis)  Comment: we did a stat chest xray as her symptoms seem somewhat concerning to me. The chest xray was unremarkable but her hemoglobin came back at 6.8 ! She was sent urgently for transfusion of 2 units packed red blood cells at [facility]   Plan: Comprehensive metabolic panel, CBC with         platelets differential, Erythrocyte         sedimentation rate auto, CRP inflammation            (Z23) Need for prophylactic vaccination and inoculation against influenza  Comment:   Plan:     (Z23) Need for prophylactic vaccination with tetanus-diphtheria (TD)  Comment: will recommend a follow up appointment which is within the next few weeks    Plan: as above     (R05) Cough in adult patient  Comment: suspect post-infectious inflammatory type of cough   Plan: XR Chest 2 Views, methylPREDNISolone (MEDROL         DOSEPAK) 4 MG tablet, Comprehensive metabolic         panel, CBC with platelets differential,         Erythrocyte sedimentation rate auto, CRP         inflammation            (Z13.6) CARDIOVASCULAR SCREENING; LDL GOAL LESS THAN 160  Comment: routine screening   Plan: Lipid panel reflex to direct LDL Fasting            (D50.0) Iron deficiency anemia due to chronic blood loss  Comment: as above   Plan: Iron and iron binding capacity, Ferritin,         CANCELED: Iron and iron binding capacity,         CANCELED: Ferritin        Further follow up needed; suspect blood loss is from menstrual periods       COUNSELING:  Reviewed preventive health counseling, as reflected in patient  "instructions       Regular exercise       Healthy diet/nutrition       Vision screening       Hearing screening       Alcohol Use     reports that she has never smoked. She has never used smokeless tobacco.    Estimated body mass index is 24.72 kg/(m^2) as calculated from the following:    Height as of this encounter: 1.603 m (5' 3.1\").    Weight as of this encounter: 63.5 kg (140 lb).     Counseling Resources:  ATP IV Guidelines  Pooled Cohorts Equation Calculator  Breast Cancer Risk Calculator  FRAX Risk Assessment  ICSI Preventive Guidelines  Dietary Guidelines for Americans, 2010  USDA's MyPlate  ASA Prophylaxis  Lung CA Screening    The information in this document, created by the medical scribe for me, accurately reflects the services I personally performed and the decisions made by me. I have reviewed and approved this document for accuracy.   MD Cedric Farrar MD  [facility]  "

## 2017-11-28 NOTE — TELEPHONE ENCOUNTER
Called [facility].  They no longer do blood transfusions there.  Called [facility].  They were able to fit patient in today.  Patient here in clinic and will sign informed consent form.  This along with signed order from Dr. Patel faxed to them at [phone].  Jennifer Lei,

## 2017-11-29 NOTE — PROGRESS NOTES
SUBJECTIVE:   Ethel Murillo is a 37 year old female who presents to clinic today with two friends for the following health issues:    I am seeing patient back for a quick follow up regarding her severe iron deficiency anemia with a hemoglobin of less then 7. Now that we've got all the workup back and can talk with patient in a sense of better understanding, this appointment went fairly easily.    Anemia/Blood Transfusion -- Patient believes she felt fatigued for a long time. This year in July, her uterine fibroids doubled in sized. However, the fibroids have decreased since she started a herbal supplement, Arum Italica mil. [facility] many people use it there. She noticed menorrhagia with some chunky blood from March to July. For the last 3 months her flow has been more normal and with no chunks of blood. [facility]. She wanted to claim credit for her decreased vaginal bleeding secondary to taking the supplements but I am not so sure that has anything to do with all of this !     Cough -- Her coughing is unchanged. The impression of the chest X-ray from 11/28 is normal. I continue to suspect a post-infectious inflammatory type of cough     Problem list and histories reviewed & adjusted, as indicated.  Additional history: as documented    Patient Active Problem List   Diagnosis     Moderate dysplasia of cervix (ROSAURA II)     Fibroid uterus     Dysmenorrhea     CARDIOVASCULAR SCREENING; LDL GOAL LESS THAN 160     Female infertility of other specified origin     Iron deficiency anemia due to chronic blood loss     Past Surgical History:   Procedure Laterality Date     CRYOTHERAPY, CERVICAL  12/2012     LASER CO2 VULVA  12/2011    small condyloma       MYOMECTOMY UTERUS  8/3/2011    2.5 cm/ 4.5 cm/ 6.5 and 7.5 cm  surgery performed in [place].         Social History   Substance Use Topics     Smoking status: Never Smoker     Smokeless  "tobacco: Never Used     Alcohol use No     Family History   Problem Relation Age of Onset     Hypertension Mother      Asthma Mother      Hypertension Father          Current Outpatient Prescriptions   Medication Sig Dispense Refill     methylPREDNISolone (MEDROL DOSEPAK) 4 MG tablet Follow package instructions 21 tablet 0     naproxen (NAPROSYN) 500 MG tablet Take 1 tablet (500 mg) by mouth 2 times daily as needed 60 tablet 2     Labs reviewed in EPIC      Reviewed and updated as needed this visit by clinical staff  Tobacco  Allergies  Meds  Med Hx  Surg Hx  Fam Hx  Soc Hx      Reviewed and updated as needed this visit by Provider         ROS:  Constitutional, HEENT, cardiovascular, pulmonary, GI, , musculoskeletal, neuro, skin, endocrine and psych systems are negative, except as otherwise noted.    This document serves as a record of the services and decisions personally performed and made by Cedric Patel MD. It was created on their behalf by Marko Driscoll, a trained medical scribe. The creation of this document is based the provider's statements to the medical scribe.  Marko Driscoll November 30, 2017 12:43 PM    OBJECTIVE:   /60  Pulse 74  Temp 97.5  F (36.4  C) (Oral)  Resp 16  Ht 1.613 m (5' 3.5\")  Wt 64 kg (141 lb)  LMP 11/21/2017 (Exact Date)  SpO2 100%  Breastfeeding? No  BMI 24.59 kg/m2  Body mass index is 24.59 kg/(m^2).  GENERAL: healthy, alert and no distress  EYES: Eyes grossly normal to inspection, EOMI, conjunctivae and sclerae normal  SKIN: no suspicious lesions or rashes to visible skin   NEURO: mentation intact and speech normal  PSYCH: mentation appears normal, affect normal/bright    Diagnostic Test Results:  Results for orders placed or performed in visit on 11/28/17   XR Chest 2 Views    Narrative    XR CHEST 2 VW 11/28/2017 10:17 AM    HISTORY: ; Cough in adult patient      Impression    IMPRESSION: Negative.    GALINDO SALGUERO MD     ASSESSMENT/PLAN:     (D25.9) " Uterine leiomyoma, unspecified location  (primary encounter diagnosis)  Comment: after some careful thought it seems to be the overwhelming sense her that this patient has uterine fibroids as the root cause of this problem with iron deficiency anemia. There can be little doubt that she needs a more active treatment, whether or not that is a total abdominal hysterectomy versus Uterine artery embolization (UAE) or what. But we need gynecologic consultation and patient is strongly encouraged to have a follow up appointment with Dr. Cottrell.  Plan: Erythrocyte sedimentation rate auto, CRP         inflammation, Hemoglobin, US Pelvic Complete w         Transvaginal            (Z23) Need for prophylactic vaccination and inoculation against influenza  Comment:   Plan:     (Z23) Need for prophylactic vaccination with tetanus-diphtheria (TD)  Comment:   Plan:     (D50.0) Iron deficiency anemia due to chronic blood loss  Comment: patient was troubled by her elevated erythrocyte sedimentation rate and C reactive protein but I did reassure patient that we did comprehensive laboratory studies and her history, exam and workup all do not point to any sinister dangerous diagnoses not yet diagnosed, I date of hospital discharge agree with 1. 2 times a day iron supplements and 2. Recheck all laboratory studies   Plan: Erythrocyte sedimentation rate auto, CRP         inflammation, Hemoglobin              Patient Instructions   - I will follow up with you about lab results.     - I recommend over the counter iron supplements, one pill twice a day.    - Follow up for an Ultrasound.    - Follow up with Dr. Cottrell.    The information in this document, created by the medical scribe for me, accurately reflects the services I personally performed and the decisions made by me. I have reviewed and approved this document for accuracy.   MD Cedric Farrar MD  [facility]

## 2017-11-30 ENCOUNTER — OFFICE VISIT (OUTPATIENT)
Dept: INTERNAL MEDICINE | Facility: CLINIC | Age: 37
End: 2017-11-30
Payer: COMMERCIAL

## 2017-11-30 VITALS
HEART RATE: 74 BPM | HEIGHT: 64 IN | SYSTOLIC BLOOD PRESSURE: 100 MMHG | OXYGEN SATURATION: 100 % | WEIGHT: 141 LBS | BODY MASS INDEX: 24.07 KG/M2 | DIASTOLIC BLOOD PRESSURE: 60 MMHG | TEMPERATURE: 97.5 F | RESPIRATION RATE: 16 BRPM

## 2017-11-30 DIAGNOSIS — Z23 NEED FOR PROPHYLACTIC VACCINATION WITH TETANUS-DIPHTHERIA (TD): ICD-10-CM

## 2017-11-30 DIAGNOSIS — Z23 NEED FOR PROPHYLACTIC VACCINATION AND INOCULATION AGAINST INFLUENZA: ICD-10-CM

## 2017-11-30 DIAGNOSIS — D50.0 IRON DEFICIENCY ANEMIA DUE TO CHRONIC BLOOD LOSS: ICD-10-CM

## 2017-11-30 DIAGNOSIS — D25.9 UTERINE LEIOMYOMA, UNSPECIFIED LOCATION: Primary | ICD-10-CM

## 2017-11-30 LAB
ERYTHROCYTE [SEDIMENTATION RATE] IN BLOOD BY WESTERGREN METHOD: 30 MM/H (ref 0–20)
HGB BLD-MCNC: 9.7 G/DL (ref 11.7–15.7)

## 2017-11-30 PROCEDURE — 99214 OFFICE O/P EST MOD 30 MIN: CPT | Performed by: INTERNAL MEDICINE

## 2017-11-30 PROCEDURE — 86140 C-REACTIVE PROTEIN: CPT | Performed by: INTERNAL MEDICINE

## 2017-11-30 PROCEDURE — 85652 RBC SED RATE AUTOMATED: CPT | Performed by: INTERNAL MEDICINE

## 2017-11-30 PROCEDURE — 36415 COLL VENOUS BLD VENIPUNCTURE: CPT | Performed by: INTERNAL MEDICINE

## 2017-11-30 PROCEDURE — 85018 HEMOGLOBIN: CPT | Performed by: INTERNAL MEDICINE

## 2017-11-30 ASSESSMENT — PAIN SCALES - GENERAL: PAINLEVEL: NO PAIN (0)

## 2017-11-30 NOTE — NURSING NOTE
"Chief Complaint   Patient presents with     RECHECK     low hgb, blood transfusion       Initial /60  Pulse 74  Temp 97.5  F (36.4  C) (Oral)  Resp 16  Ht 5' 3.5\" (1.613 m)  Wt 141 lb (64 kg)  LMP 11/21/2017 (Exact Date)  SpO2 100%  Breastfeeding? No  BMI 24.59 kg/m2 Estimated body mass index is 24.59 kg/(m^2) as calculated from the following:    Height as of this encounter: 5' 3.5\" (1.613 m).    Weight as of this encounter: 141 lb (64 kg).  Medication Reconciliation: complete   Sylvia Rodriguez CMA (AAMA)      "

## 2017-11-30 NOTE — MR AVS SNAPSHOT
After Visit Summary   11/30/2017    Ethel Murillo    MRN: [id number]           Patient Information     Date Of Birth          1980        Visit Information        Provider Department      11/30/2017 12:10 PM Cedric Patel MD [facility]        Today's Diagnoses     Uterine leiomyoma, unspecified location    -  1    Need for prophylactic vaccination and inoculation against influenza        Need for prophylactic vaccination with tetanus-diphtheria (TD)        Iron deficiency anemia due to chronic blood loss          Care Instructions    - I will follow up with you about lab results.     - I recommend over the counter iron supplements, one pill twice a day.    - Follow up for an Ultrasound.    - Follow up with Dr. Cottrell.      [facility]    If you have any questions regarding to your visit please contact your care team:     Team Pink:   Clinic Hours Telephone Number   Internal Medicine:  Dr. Clarisse Webb NP       7am-7pm  Monday - Thursday   7am-5pm  Fridays  [phone]  (Appointment scheduling available 24/7)    Questions about your visit?  Team Line  [phone]   Urgent Care - [facility] and [place] [facility] - 11am-9pm Monday-Friday Saturday-Sunday- 9am-5pm   [place] - 5pm-9pm Monday-Friday Saturday-Sunday- 9am-5pm  [phone] - [facility]   [phone] - [place]       What options do I have for visits at the clinic other than the traditional office visit?  To expand how we care for you, many of our providers are utilizing electronic visits (e-visits) and telephone visits, when medically appropriate, for interactions with their patients rather than a visit in the clinic.   We also offer nurse visits for many medical concerns. Just like any other service, we will bill your insurance company for this type of visit based on time spent on the phone with your provider. Not all insurance companies cover these visits.  Please check with your medical insurance if this type of visit is covered. You will be responsible for any charges that are not paid by your insurance.      E-visits via TennisHubhart:  generally incur a $35.00 fee.  Telephone visits:  Time spent on the phone: *charged based on time that is spent on the phone in increments of 10 minutes. Estimated cost:   5-10 mins $30.00   11-20 mins. $59.00   21-30 mins. $85.00   Use Surfly (secure email communication and access to your chart) to send your primary care provider a message or make an appointment. Ask someone on your Team how to sign up for Surfly.    For a Price Quote for your services, please call our DailyWorth Line at [phone].    As always, Thank you for trusting us with your health care needs!    Michell Grace, HAM          Follow-ups after your visit        Future tests that were ordered for you today     Open Future Orders        Priority Expected Expires Ordered    US Pelvic Complete w Transvaginal Routine  11/30/2018 11/30/2017            Who to contact     If you have questions or need follow up information about today's clinic visit or your schedule please contact [facility] directly at [phone].  Normal or non-critical lab and imaging results will be communicated to you by TennisHubhart, letter or phone within 4 business days after the clinic has received the results. If you do not hear from us within 7 days, please contact the clinic through TennisHubhart or phone. If you have a critical or abnormal lab result, we will notify you by phone as soon as possible.  Submit refill requests through Surfly or call your pharmacy and they will forward the refill request to us. Please allow 3 business days for your refill to be completed.          Additional Information About Your Visit        TennisHubharInvenshure Information     Surfly gives you secure access to your electronic health record. If you see a primary care provider, you can also send messages to your care  "team and make appointments. If you have questions, please call your primary care clinic.  If you do not have a primary care provider, please call [phone] and they will assist you.        Care EveryWhere ID     This is your Care EveryWhere ID. This could be used by other organizations to access your [facility] medical records  [id number]        Your Vitals Were     Pulse Temperature Respirations Height Last Period Pulse Oximetry    74 97.5  F (36.4  C) (Oral) 16 5' 3.5\" (1.613 m) 11/21/2017 (Exact Date) 100%    Breastfeeding? BMI (Body Mass Index)                No 24.59 kg/m2           Blood Pressure from Last 3 Encounters:   11/30/17 100/60   11/28/17 108/60   06/27/17 104/65    Weight from Last 3 Encounters:   11/30/17 141 lb (64 kg)   11/28/17 140 lb (63.5 kg)   06/27/17 143 lb 9.6 oz (65.1 kg)              We Performed the Following     CRP inflammation     Erythrocyte sedimentation rate auto     Hemoglobin        Primary Care Provider Office Phone # Fax #    Nico Girish Cottrell [phone] [facility] [address]        Equal Access to Services     SHANI WHARTON : Hadii aad ku hadasho Soomaali, waaxda luqadaha, qaybta kaalmada adeegyada, gloria idiin haylindan javier abdullahi lasami . So [facility] [phone].    ATENCIÓN: Si habla español, tiene a howe disposición servicios gratuitos de asistencia lingüística. Llame al [phone].    We comply with applicable federal civil rights laws and [place] laws. We do not discriminate on the basis of race, color, national origin, age, disability, sex, sexual orientation, or gender identity.            Thank you!     Thank you for choosing [facility]  for your care. Our goal is always to provide you with excellent care. Hearing back from our patients is one way we can continue to improve our services. Please take a few minutes to complete the written survey that you may receive in the mail after your visit with us. " Thank you!             Your Updated Medication List - Protect others around you: Learn how to safely use, store and throw away your medicines at www.disposemymeds.org.          This list is accurate as of: 11/30/17 12:55 PM.  Always use your most recent med list.                   Brand Name Dispense Instructions for use Diagnosis    methylPREDNISolone 4 MG tablet    MEDROL DOSEPAK    21 tablet    Follow package instructions    Cough in adult patient       naproxen 500 MG tablet    NAPROSYN    60 tablet    Take 1 tablet (500 mg) by mouth 2 times daily as needed    Dysmenorrhea

## 2017-11-30 NOTE — PATIENT INSTRUCTIONS
- I will follow up with you about lab results.     - I recommend over the counter iron supplements, one pill twice a day.    - Follow up for an Ultrasound.    - Follow up with Dr. Cottrell.      [facility]    If you have any questions regarding to your visit please contact your care team:     Team Pink:   Clinic Hours Telephone Number   Internal Medicine:  Dr. Clarisse Webb, NP       7am-7pm  Monday - Thursday   7am-5pm  Fridays  [phone]  (Appointment scheduling available 24/7)    Questions about your visit?  Team Line  [phone]   [facility] [place] [facility]am-9pm Monday-Friday Saturday-Sunday- 9am-5pm   [place] - 5pm-9pm Monday-Friday Saturday-Sunday- 9am-5pm  [phone] - [facility]   [phone] - [place]       What options do I have for visits at the clinic other than the traditional office visit?  To expand how we care for you, many of our providers are utilizing electronic visits (e-visits) and telephone visits, when medically appropriate, for interactions with their patients rather than a visit in the clinic.   We also offer nurse visits for many medical concerns. Just like any other service, we will bill your insurance company for this type of visit based on time spent on the phone with your provider. Not all insurance companies cover these visits. Please check with your medical insurance if this type of visit is covered. You will be responsible for any charges that are not paid by your insurance.      E-visits via [employer]:  generally incur a $35.00 fee.  Telephone visits:  Time spent on the phone: *charged based on time that is spent on the phone in increments of 10 minutes. Estimated cost:   5-10 mins $30.00   11-20 mins. $59.00   21-30 mins. $85.00   Use [employer] (secure email communication and access to your chart) to send your primary care provider a message or make an appointment. Ask someone on your Team how  to sign up for YODIL.    For a Price Quote for your services, please call our Consumer Price Line at [phone].    As always, Thank you for trusting us with your health care needs!    Michell Grace CMA

## 2017-12-01 LAB — CRP SERPL-MCNC: <2.9 MG/L (ref 0–8)

## 2018-01-19 ENCOUNTER — TELEPHONE (OUTPATIENT)
Dept: INTERNAL MEDICINE | Facility: CLINIC | Age: 38
End: 2018-01-19

## 2018-01-19 NOTE — TELEPHONE ENCOUNTER
Reason for Call:  Other call back    Detailed comments:   calling. klaudia is in [place]. She is dizzy. How much iron is she to take per day? Please call and advise.     Phone Number Patient can be reached at: Home number on file [phone] (home)    Best Time:  Any     Can we leave a detailed message on this number? YES    Call taken on 1/19/2018 at 8:53 AM by Salina Mayers

## 2018-01-19 NOTE — TELEPHONE ENCOUNTER
Per last OV notes: 2 times a day iron supplements    Per patient's spouse, [place]  She c/o dizziness and SOB to him and requested that he call the clinic to ask how much iron she is supposed to be on   Advised him that patient can take OTC iron supplements BID but should seek medical care if having symptoms, then f/u when she comes back to town  He verbalized understanding and will contact patient now to advise her of this    Carmelo Aaron RN

## 2018-02-12 NOTE — PROGRESS NOTES
"  SUBJECTIVE:   Ethel Murillo is a 37 year old female who presents to clinic today for the following health issues:    Patient presents with:  RECHECK:   Anemia - Patient states she still feels a little nausea, and no appetite, also very fatigue.   Patient states she wants her blood levels checked again to make sure everything is okay.   Health Maintenance: DUE: Tdap, Influenza Vaccine, Pap    HPI:  Ethel is a pleasant 38 yo F with a history of iron deficiency anemia due to blood loss from uterine fibroids who had a blood transfusion in 11/2017. Surgery had been suggested with possiblehysterectomy she declined and started a herbal supplement from [place] and feeling much better.  At this time feeling \"sleepy\"; returned form [place] 7 days ago  Nausea, poor appetite and not feeling well/unsteady, no dizziness..    LMP: 2/11/2018 and still going on used to have blood clots but not at this times.    Problem list and histories reviewed & adjusted, as indicated.  Additional history: as documented    Patient Active Problem List   Diagnosis     Moderate dysplasia of cervix (ROSAURA II)     Fibroid uterus     Dysmenorrhea     CARDIOVASCULAR SCREENING; LDL GOAL LESS THAN 160     Female infertility of other specified origin     Iron deficiency anemia due to chronic blood loss     Past Surgical History:   Procedure Laterality Date     CRYOTHERAPY, CERVICAL  12/2012     LASER CO2 VULVA  12/2011    small condyloma       MYOMECTOMY UTERUS  8/3/2011    2.5 cm/ 4.5 cm/ 6.5 and 7.5 cm  surgery performed in [place].         Social History   Substance Use Topics     Smoking status: Never Smoker     Smokeless tobacco: Never Used     Alcohol use No     Family History   Problem Relation Age of Onset     Hypertension Mother      Asthma Mother      Ulcerative Colitis Mother      Hypertension Father          Reviewed and updated as needed this visit by clinical staff  Tobacco  Allergies  Meds  Med Hx  Surg Hx  Fam Hx  Soc Hx    " "    ROS:   HEENT, cardiovascular, pulmonary, gi and gu systems are negative, except as otherwise noted.    OBJECTIVE:     /62  Pulse 76  Temp 97.5  F (36.4  C) (Oral)  Resp 16  Ht 5' 3.39\" (1.61 m)  Wt 141 lb 8 oz (64.2 kg)  SpO2 100%  BMI 24.76 kg/m2  Body mass index is 24.76 kg/(m^2).  GENERAL: healthy, alert and no distress  NECK: no adenopathy and thyroid normal to palpation  RESP: lungs clear to auscultation - no rales, rhonchi or wheezes  CV: regular rate and rhythm, normal S1 S2, no S3 or S4, no murmur, click or rub, no peripheral edema.  ABDOMEN: soft, nontender, no masses and bowel sounds normal  MS: no gross musculoskeletal defects noted, no edema    Diagnostic Test Results:  none     ASSESSMENT/PLAN:     (D50.9) Iron deficiency anemia, unspecified iron deficiency anemia type  (primary encounter diagnosis)  Comment: Hgb improved. Recheck CBC  Plan: CBC with platelets differential, Basic         metabolic panel    (D25.1) Intramural leiomyoma of uterus  Comment: Cause of bleeding resulting in anemia. Surgery had been discussed but she deferred this, she is taking a herbal supplement for fibroids and has ultrasound pictures from [facility] showing that the fibroid has shrank.  Plan: Continue follow up with OB/GYN    (R63.0) Loss of appetite  Comment: Could related to on going stress    (R53.83) Fatigue, unspecified type  Comment: Anemia and stress could be contributors  Plan: Continue iron supplement    Follow up in 3 months or sooner with concerns    Israel Ramirez MD  [facility]      "

## 2018-02-13 ENCOUNTER — OFFICE VISIT (OUTPATIENT)
Dept: FAMILY MEDICINE | Facility: CLINIC | Age: 38
End: 2018-02-13
Payer: COMMERCIAL

## 2018-02-13 VITALS
WEIGHT: 141.5 LBS | HEIGHT: 63 IN | TEMPERATURE: 97.5 F | RESPIRATION RATE: 16 BRPM | SYSTOLIC BLOOD PRESSURE: 102 MMHG | OXYGEN SATURATION: 100 % | HEART RATE: 76 BPM | DIASTOLIC BLOOD PRESSURE: 65 MMHG | BODY MASS INDEX: 25.07 KG/M2

## 2018-02-13 DIAGNOSIS — R53.83 FATIGUE, UNSPECIFIED TYPE: ICD-10-CM

## 2018-02-13 DIAGNOSIS — D25.1 INTRAMURAL LEIOMYOMA OF UTERUS: ICD-10-CM

## 2018-02-13 DIAGNOSIS — Z23 NEED FOR PROPHYLACTIC VACCINATION WITH TETANUS-DIPHTHERIA (TD): ICD-10-CM

## 2018-02-13 DIAGNOSIS — D50.9 IRON DEFICIENCY ANEMIA, UNSPECIFIED IRON DEFICIENCY ANEMIA TYPE: Primary | ICD-10-CM

## 2018-02-13 DIAGNOSIS — R63.0 LOSS OF APPETITE: ICD-10-CM

## 2018-02-13 LAB
ANION GAP SERPL CALCULATED.3IONS-SCNC: 4 MMOL/L (ref 3–14)
BASOPHILS # BLD AUTO: 0 10E9/L (ref 0–0.2)
BASOPHILS NFR BLD AUTO: 0 %
BUN SERPL-MCNC: 13 MG/DL (ref 7–30)
CALCIUM SERPL-MCNC: 8.7 MG/DL (ref 8.5–10.1)
CHLORIDE SERPL-SCNC: 107 MMOL/L (ref 94–109)
CO2 SERPL-SCNC: 29 MMOL/L (ref 20–32)
CREAT SERPL-MCNC: 0.59 MG/DL (ref 0.52–1.04)
DIFFERENTIAL METHOD BLD: ABNORMAL
EOSINOPHIL # BLD AUTO: 0.1 10E9/L (ref 0–0.7)
EOSINOPHIL NFR BLD AUTO: 1.9 %
ERYTHROCYTE [DISTWIDTH] IN BLOOD BY AUTOMATED COUNT: 21.2 % (ref 10–15)
GFR SERPL CREATININE-BSD FRML MDRD: >90 ML/MIN/1.7M2
GLUCOSE SERPL-MCNC: 81 MG/DL (ref 70–99)
HCT VFR BLD AUTO: 36.5 % (ref 35–47)
HGB BLD-MCNC: 11.7 G/DL (ref 11.7–15.7)
LYMPHOCYTES # BLD AUTO: 0.9 10E9/L (ref 0.8–5.3)
LYMPHOCYTES NFR BLD AUTO: 25.5 %
MCH RBC QN AUTO: 28.7 PG (ref 26.5–33)
MCHC RBC AUTO-ENTMCNC: 32.1 G/DL (ref 31.5–36.5)
MCV RBC AUTO: 90 FL (ref 78–100)
MONOCYTES # BLD AUTO: 0.6 10E9/L (ref 0–1.3)
MONOCYTES NFR BLD AUTO: 15.5 %
NEUTROPHILS # BLD AUTO: 2.1 10E9/L (ref 1.6–8.3)
NEUTROPHILS NFR BLD AUTO: 57.1 %
PLATELET # BLD AUTO: 253 10E9/L (ref 150–450)
POTASSIUM SERPL-SCNC: 4.5 MMOL/L (ref 3.4–5.3)
RBC # BLD AUTO: 4.07 10E12/L (ref 3.8–5.2)
SODIUM SERPL-SCNC: 140 MMOL/L (ref 133–144)
WBC # BLD AUTO: 3.7 10E9/L (ref 4–11)

## 2018-02-13 PROCEDURE — 85025 COMPLETE CBC W/AUTO DIFF WBC: CPT | Performed by: FAMILY MEDICINE

## 2018-02-13 PROCEDURE — 36415 COLL VENOUS BLD VENIPUNCTURE: CPT | Performed by: FAMILY MEDICINE

## 2018-02-13 PROCEDURE — 80048 BASIC METABOLIC PNL TOTAL CA: CPT | Performed by: FAMILY MEDICINE

## 2018-02-13 PROCEDURE — 99214 OFFICE O/P EST MOD 30 MIN: CPT | Performed by: FAMILY MEDICINE

## 2018-02-13 NOTE — PATIENT INSTRUCTIONS
[facility]    If you have any questions regarding to your visit please contact your care team:       Team Purple:   Clinic Hours Telephone Number   Dr. Mariya Tidwell   7am-7pm  Monday - Thursday   7am-5pm  Fridays  [phone]  (Appointment scheduling available 24/7)    Questions about your Visit?   Team Line:  [phone]   Urgent Care - [place] and [facility] - 11am-9pm Monday-Friday Saturday-Sunday- 9am-5pm   [place] - 5pm-9pm Monday-Friday Saturday-Sunday- 9am-5pm  [phone] - [facility]  [phone] - [place]       What options do I have for visits at the clinic other than the traditional office visit?  To expand how we care for you, many of our providers are utilizing electronic visits (e-visits) and telephone visits, when medically appropriate, for interactions with their patients rather than a visit in the clinic.   We also offer nurse visits for many medical concerns. Just like any other service, we will bill your insurance company for this type of visit based on time spent on the phone with your provider. Not all insurance companies cover these visits. Please check with your medical insurance if this type of visit is covered. You will be responsible for any charges that are not paid by your insurance.      E-visits via [employer]:  generally incur a $35.00 fee.  Telephone visits:  Time spent on the phone: *charged based on time that is spent on the phone in increments of 10 minutes. Estimated cost:   5-10 mins $30.00   11-20 mins. $59.00   21-30 mins. $85.00     Use Ocapohart (secure email communication and access to your chart) to send your primary care provider a message or make an appointment. Ask someone on your Team how to sign up for [employer].  For a Price Quote for your services, please call our Consumer Price Line at [phone].  As always, Thank you for trusting us with your health care  needs!      Marj COSTA MA

## 2018-02-13 NOTE — MR AVS SNAPSHOT
After Visit Summary   2/13/2018    Ethel Murillo    MRN: [id number]           Patient Information     Date Of Birth          1980        Visit Information        Provider Department      2/13/2018 10:00 AM Israel Ramirez MD [facility]        Today's Diagnoses     Iron deficiency anemia, unspecified iron deficiency anemia type    -  1    Loss of appetite        Fatigue, unspecified type        Need for prophylactic vaccination with tetanus-diphtheria (TD)          Care Instructions    [facility]    If you have any questions regarding to your visit please contact your care team:       Team Purple:   Clinic Hours Telephone Number   Dr. Mariya Tidwell   7am-7pm  Monday - Thursday   7am-5pm  Fridays  [phone]  (Appointment scheduling available 24/7)    Questions about your Visit?   Team Line:  [phone]   Urgent Care - [place] and [facility] - 11am-9pm Monday-Friday Saturday-Sunday- 9am-5pm   [place] - 5pm-9pm Monday-Friday Saturday-Sunday- 9am-5pm  [phone] - [place]   [phone] - Newborn       What options do I have for visits at the clinic other than the traditional office visit?  To expand how we care for you, many of our providers are utilizing electronic visits (e-visits) and telephone visits, when medically appropriate, for interactions with their patients rather than a visit in the clinic.   We also offer nurse visits for many medical concerns. Just like any other service, we will bill your insurance company for this type of visit based on time spent on the phone with your provider. Not all insurance companies cover these visits. Please check with your medical insurance if this type of visit is covered. You will be responsible for any charges that are not paid by your insurance.      E-visits via [employer]:  generally incur a $35.00 fee.  Telephone visits:  Time  spent on the phone: *charged based on time that is spent on the phone in increments of 10 minutes. Estimated cost:   5-10 mins $30.00   11-20 mins. $59.00   21-30 mins. $85.00     Use MediaPlatformhart (secure email communication and access to your chart) to send your primary care provider a message or make an appointment. Ask someone on your Team how to sign up for Digital Rivert.  For a Price Quote for your services, please call our MicroPhage Line at [phone].  As always, Thank you for trusting us with your health care needs!      Marj COSTA MA            Follow-ups after your visit        Who to contact     If you have questions or need follow up information about today's clinic visit or your schedule please contact [facility] directly at [phone].  Normal or non-critical lab and imaging results will be communicated to you by MediaPlatformhart, letter or phone within 4 business days after the clinic has received the results. If you do not hear from us within 7 days, please contact the clinic through MediaPlatformhart or phone. If you have a critical or abnormal lab result, we will notify you by phone as soon as possible.  Submit refill requests through BitAnimate or call your pharmacy and they will forward the refill request to us. Please allow 3 business days for your refill to be completed.          Additional Information About Your Visit        MediaPlatformhart Information     Digital Rivert gives you secure access to your electronic health record. If you see a primary care provider, you can also send messages to your care team and make appointments. If you have questions, please call your primary care clinic.  If you do not have a primary care provider, please call [phone] and they will assist you.        Care EveryWhere ID     This is your Care EveryWhere ID. This could be used by other organizations to access your [place] medical records  [id number]        Your Vitals Were     Pulse Temperature Respirations Height Pulse  "Oximetry BMI (Body Mass Index)    76 97.5  F (36.4  C) (Oral) 16 5' 3.39\" (1.61 m) 100% 24.76 kg/m2       Blood Pressure from Last 3 Encounters:   02/13/18 102/65   11/30/17 100/60   11/28/17 108/60    Weight from Last 3 Encounters:   02/13/18 141 lb 8 oz (64.2 kg)   11/30/17 141 lb (64 kg)   11/28/17 140 lb (63.5 kg)              Today, you had the following     No orders found for display       Primary Care Provider Office Phone # Fax #    Nico Girish Cottrell [phone] [address] [facility] [address]        Equal Access to Services     SHANI WHARTON : Hadii maria del carmen delarosao Sotammie, waaxda luqadaha, qaybta kaalmada adeegyada, gloria ruiz . So [facility] [phone].    ATENCIÓN: Si habla español, tiene a howe disposición servicios gratuitos de asistencia lingüística. Llame al [phone].    We comply with applicable federal civil rights laws and [place] laws. We do not discriminate on the basis of race, color, national origin, age, disability, sex, sexual orientation, or gender identity.            Thank you!     Thank you for choosing [facility]  for your care. Our goal is always to provide you with excellent care. Hearing back from our patients is one way we can continue to improve our services. Please take a few minutes to complete the written survey that you may receive in the mail after your visit with us. Thank you!             Your Updated Medication List - Protect others around you: Learn how to safely use, store and throw away your medicines at www.disposemymeds.org.          This list is accurate as of 2/13/18 10:14 AM.  Always use your most recent med list.                   Brand Name Dispense Instructions for use Diagnosis    IRON SUPPLEMENT PO      Take by mouth 2 times daily (with meals)        naproxen 500 MG tablet    NAPROSYN    60 tablet    Take 1 tablet (500 mg) by mouth 2 times daily as needed    Dysmenorrhea         "

## 2018-02-13 NOTE — NURSING NOTE
"Chief Complaint   Patient presents with     RECHECK     Anemia - Patient states she still feels a little nausea, and no appetite. Patient states she wants her blood levels checked again to make sure everything is okay.      Health Maintenance     DUE: Tdap, Influenza Vaccine, Pap       Initial /62  Pulse 76  Temp 97.5  F (36.4  C) (Oral)  Resp 16  Ht 5' 3.39\" (1.61 m)  Wt 141 lb 8 oz (64.2 kg)  SpO2 100%  BMI 24.76 kg/m2 Estimated body mass index is 24.76 kg/(m^2) as calculated from the following:    Height as of this encounter: 5' 3.39\" (1.61 m).    Weight as of this encounter: 141 lb 8 oz (64.2 kg).  Medication Reconciliation: complete   Marj COSTA MA      "

## 2018-02-25 ENCOUNTER — HEALTH MAINTENANCE LETTER (OUTPATIENT)
Age: 38
End: 2018-02-25

## 2018-02-26 ENCOUNTER — TELEPHONE (OUTPATIENT)
Dept: FAMILY MEDICINE | Facility: CLINIC | Age: 38
End: 2018-02-26

## 2018-02-26 DIAGNOSIS — N94.6 DYSMENORRHEA: ICD-10-CM

## 2018-02-26 DIAGNOSIS — D50.0 IRON DEFICIENCY ANEMIA DUE TO CHRONIC BLOOD LOSS: Primary | ICD-10-CM

## 2018-02-26 NOTE — TELEPHONE ENCOUNTER
Reason for Call:  Other call back    Detailed comments:  Patient calling. She would like to have additional labs done. Please call to discuss.     Phone Number Patient can be reached at: Home number on file [phone] (home)    Best Time:  Any     Can we leave a detailed message on this number? YES    Call taken on 2/26/2018 at 9:35 AM by Salina Mayers

## 2018-02-27 DIAGNOSIS — D50.0 IRON DEFICIENCY ANEMIA DUE TO CHRONIC BLOOD LOSS: ICD-10-CM

## 2018-02-27 DIAGNOSIS — N94.6 DYSMENORRHEA: ICD-10-CM

## 2018-02-27 LAB
ALBUMIN UR-MCNC: NEGATIVE MG/DL
APPEARANCE UR: CLEAR
BILIRUB UR QL STRIP: NEGATIVE
COLOR UR AUTO: YELLOW
FERRITIN SERPL-MCNC: 13 NG/ML (ref 12–150)
GLUCOSE UR STRIP-MCNC: NEGATIVE MG/DL
HGB UR QL STRIP: NEGATIVE
IRON SATN MFR SERPL: 44 % (ref 15–46)
IRON SERPL-MCNC: 155 UG/DL (ref 35–180)
KETONES UR STRIP-MCNC: NEGATIVE MG/DL
LEUKOCYTE ESTERASE UR QL STRIP: NEGATIVE
NITRATE UR QL: NEGATIVE
PH UR STRIP: 6.5 PH (ref 5–7)
SOURCE: NORMAL
SP GR UR STRIP: 1.02 (ref 1–1.03)
TIBC SERPL-MCNC: 349 UG/DL (ref 240–430)
UROBILINOGEN UR STRIP-ACNC: 0.2 EU/DL (ref 0.2–1)

## 2018-02-27 PROCEDURE — 83540 ASSAY OF IRON: CPT | Performed by: INTERNAL MEDICINE

## 2018-02-27 PROCEDURE — 83550 IRON BINDING TEST: CPT | Performed by: INTERNAL MEDICINE

## 2018-02-27 PROCEDURE — 36415 COLL VENOUS BLD VENIPUNCTURE: CPT | Performed by: INTERNAL MEDICINE

## 2018-02-27 PROCEDURE — 82728 ASSAY OF FERRITIN: CPT | Performed by: INTERNAL MEDICINE

## 2018-02-27 PROCEDURE — 81003 URINALYSIS AUTO W/O SCOPE: CPT | Performed by: FAMILY MEDICINE

## 2018-02-27 NOTE — TELEPHONE ENCOUNTER
Patient with iron deficiency anemia; were to check ferritin and iron levels   Will check UA as well

## 2018-03-06 ENCOUNTER — MYC MEDICAL ADVICE (OUTPATIENT)
Dept: FAMILY MEDICINE | Facility: CLINIC | Age: 38
End: 2018-03-06

## 2018-03-06 ENCOUNTER — OFFICE VISIT (OUTPATIENT)
Dept: FAMILY MEDICINE | Facility: CLINIC | Age: 38
End: 2018-03-06
Payer: COMMERCIAL

## 2018-03-06 VITALS
HEART RATE: 75 BPM | OXYGEN SATURATION: 99 % | BODY MASS INDEX: 24.76 KG/M2 | TEMPERATURE: 97.4 F | SYSTOLIC BLOOD PRESSURE: 98 MMHG | WEIGHT: 141.5 LBS | DIASTOLIC BLOOD PRESSURE: 58 MMHG | RESPIRATION RATE: 14 BRPM

## 2018-03-06 DIAGNOSIS — N94.6 DYSMENORRHEA: ICD-10-CM

## 2018-03-06 DIAGNOSIS — D50.0 IRON DEFICIENCY ANEMIA DUE TO CHRONIC BLOOD LOSS: ICD-10-CM

## 2018-03-06 DIAGNOSIS — D50.9 IRON DEFICIENCY ANEMIA, UNSPECIFIED IRON DEFICIENCY ANEMIA TYPE: Primary | ICD-10-CM

## 2018-03-06 DIAGNOSIS — E55.9 VITAMIN D DEFICIENCY: ICD-10-CM

## 2018-03-06 DIAGNOSIS — E56.9 VITAMIN DEFICIENCY: ICD-10-CM

## 2018-03-06 DIAGNOSIS — D25.9 UTERINE LEIOMYOMA, UNSPECIFIED LOCATION: ICD-10-CM

## 2018-03-06 LAB
DEPRECATED CALCIDIOL+CALCIFEROL SERPL-MC: 7 UG/L (ref 20–75)
VIT B12 SERPL-MCNC: 219 PG/ML (ref 193–986)

## 2018-03-06 PROCEDURE — 99213 OFFICE O/P EST LOW 20 MIN: CPT | Performed by: FAMILY MEDICINE

## 2018-03-06 PROCEDURE — 82607 VITAMIN B-12: CPT | Performed by: FAMILY MEDICINE

## 2018-03-06 PROCEDURE — 36415 COLL VENOUS BLD VENIPUNCTURE: CPT | Performed by: FAMILY MEDICINE

## 2018-03-06 PROCEDURE — 82306 VITAMIN D 25 HYDROXY: CPT | Performed by: FAMILY MEDICINE

## 2018-03-06 ASSESSMENT — PAIN SCALES - GENERAL: PAINLEVEL: NO PAIN (0)

## 2018-03-06 NOTE — MR AVS SNAPSHOT
After Visit Summary   3/6/2018    Ethel Murillo    MRN: [id number]           Patient Information     Date Of Birth          1980        Visit Information        Provider Department      3/6/2018 11:40 AM Israel Ramirez MD [facility]        Today's Diagnoses     Iron deficiency anemia, unspecified iron deficiency anemia type    -  1    Dysmenorrhea        Uterine leiomyoma, unspecified location          Care Instructions    [facility]    If you have any questions regarding to your visit please contact your care team:       Team Purple:   Clinic Hours Telephone Number   Dr. Mariya Tidwell   7am-7pm  Monday - Thursday   7am-5pm  Fridays  [phone]  (Appointment scheduling available 24/7)    Questions about your Visit?   Team Line:  [phone]   [facility] [place] [facility] - 11am-9pm Monday-Friday Saturday-Sunday- 9am-5pm   [place] - 5pm-9pm Monday-Friday Saturday-Sunday- 9am-5pm  [phone] - [place]   [phone] - [place]       What options do I have for visits at the clinic other than the traditional office visit?  To expand how we care for you, many of our providers are utilizing electronic visits (e-visits) and telephone visits, when medically appropriate, for interactions with their patients rather than a visit in the clinic.   We also offer nurse visits for many medical concerns. Just like any other service, we will bill your insurance company for this type of visit based on time spent on the phone with your provider. Not all insurance companies cover these visits. Please check with your medical insurance if this type of visit is covered. You will be responsible for any charges that are not paid by your insurance.      E-visits via [employer]:  generally incur a $35.00 fee.  Telephone visits:  Time spent on the phone: *charged based on time that is spent on the  phone in increments of 10 minutes. Estimated cost:   5-10 mins $30.00   11-20 mins. $59.00   21-30 mins. $85.00     Use ZUCHEM (secure email communication and access to your chart) to send your primary care provider a message or make an appointment. Ask someone on your Team how to sign up for WeSpeket.  For a Price Quote for your services, please call our [employer]Droppy) Line at [phone].  As always, Thank you for trusting us with your health care needs!    Melania Stokes MA            Follow-ups after your visit        Your next 10 appointments already scheduled     Mar 06, 2018 11:40 AM CST   SHORT with Israel Ramirez MD   [facility] ([facility])    [address] [place] [id number] [phone]              Future tests that were ordered for you today     Open Future Orders        Priority Expected Expires Ordered    Iron Routine 4/6/2018 3/6/2019 3/6/2018    Hemoglobin Routine 4/6/2018 4/6/2018 3/6/2018    Ferritin Routine 4/6/2018 5/6/2018 3/6/2018            Who to contact     If you have questions or need follow up information about today's clinic visit or your schedule please contact [facility] directly at [phone].  Normal or non-critical lab and imaging results will be communicated to you by Fareyehart, letter or phone within 4 business days after the clinic has received the results. If you do not hear from us within 7 days, please contact the clinic through Fareyehart or phone. If you have a critical or abnormal lab result, we will notify you by phone as soon as possible.  Submit refill requests through ZUCHEM or call your pharmacy and they will forward the refill request to us. Please allow 3 business days for your refill to be completed.          Additional Information About Your Visit        FareyeharMission Control Technologies Information     ZUCHEM gives you secure access to your electronic health record. If you see a primary care provider, you can also send  messages to your care team and make appointments. If you have questions, please call your primary care clinic.  If you do not have a primary care provider, please call [phone] and they will assist you.        Care EveryWhere ID     This is your Care EveryWhere ID. This could be used by other organizations to access your [place] medical records  [id number]        Your Vitals Were     Pulse Temperature Respirations Pulse Oximetry BMI (Body Mass Index)       75 97.4  F (36.3  C) (Oral) 14 99% 24.76 kg/m2        Blood Pressure from Last 3 Encounters:   03/06/18 98/58   02/13/18 102/65   11/30/17 100/60    Weight from Last 3 Encounters:   03/06/18 141 lb 8 oz (64.2 kg)   02/13/18 141 lb 8 oz (64.2 kg)   11/30/17 141 lb (64 kg)               Primary Care Provider Office Phone # Fax #    Nico Girish Cottrell -[phone]       [address] [facility] [address]        Equal Access to Services     [facility]: Hadii aad ku hadasho Soomaali, waaxda luqadaha, qaybta kaalmada adeegyada, gloria ruiz . So [facility] [phone].    ATENCIÓN: Si habla español, tiene a howe disposición servicios gratuitos de asistencia lingüística. Llame al [phone].    We comply with applicable federal civil rights laws and [place] laws. We do not discriminate on the basis of race, color, national origin, age, disability, sex, sexual orientation, or gender identity.            Thank you!     Thank you for choosing [facility]  for your care. Our goal is always to provide you with excellent care. Hearing back from our patients is one way we can continue to improve our services. Please take a few minutes to complete the written survey that you may receive in the mail after your visit with us. Thank you!             Your Updated Medication List - Protect others around you: Learn how to safely use, store and throw away your medicines at www.disposemymeds.org.          This  list is accurate as of 3/6/18 11:33 AM.  Always use your most recent med list.                   Brand Name Dispense Instructions for use Diagnosis    IRON SUPPLEMENT PO      Take by mouth 2 times daily (with meals)        naproxen 500 MG tablet    NAPROSYN    60 tablet    Take 1 tablet (500 mg) by mouth 2 times daily as needed    Dysmenorrhea

## 2018-03-06 NOTE — PATIENT INSTRUCTIONS
[facility]    If you have any questions regarding to your visit please contact your care team:       Team Purple:   Clinic Hours Telephone Number   Dr. Mariya Tidwell   7am-7pm  Monday - Thursday   7am-5pm  Fridays  [phone]  (Appointment scheduling available 24/7)    Questions about your Visit?   Team Line:  [phone]   Urgent Care - [place] and [facility] - 11am-9pm Monday-Friday Saturday-Sunday- 9am-5pm   [place] - 5pm-9pm Monday-Friday Saturday-Sunday- 9am-5pm  [phone] - [facility]  [phone] - [place]       What options do I have for visits at the clinic other than the traditional office visit?  To expand how we care for you, many of our providers are utilizing electronic visits (e-visits) and telephone visits, when medically appropriate, for interactions with their patients rather than a visit in the clinic.   We also offer nurse visits for many medical concerns. Just like any other service, we will bill your insurance company for this type of visit based on time spent on the phone with your provider. Not all insurance companies cover these visits. Please check with your medical insurance if this type of visit is covered. You will be responsible for any charges that are not paid by your insurance.      E-visits via [employer]:  generally incur a $35.00 fee.  Telephone visits:  Time spent on the phone: *charged based on time that is spent on the phone in increments of 10 minutes. Estimated cost:   5-10 mins $30.00   11-20 mins. $59.00   21-30 mins. $85.00     Use Actixhart (secure email communication and access to your chart) to send your primary care provider a message or make an appointment. Ask someone on your Team how to sign up for [employer].  For a Price Quote for your services, please call our Consumer Price Line at [phone].  As always, Thank you for trusting us with your health care  needs!    Melania Stokes MA

## 2018-03-06 NOTE — PROGRESS NOTES
SUBJECTIVE:   Ethel Murillo is a 37 year old female who presents to clinic today for the following health issues:    Chief Complaint   Patient presents with     RECHECK     Iron discuss, lab results. Iron decreased from twice daily to once daily.     Health Maintenance     Tetanus, PAP        Patient is here concerned about her iron levels; which are high though ferritin is low.  Currently taking 2 iron pills daily, having had a blood transfusion a few months ago.  He iron deficiency related to heavy menstrual bleeding, possibly from fibroids.    Problem list and histories reviewed & adjusted, as indicated.  Additional history: as documented    Patient Active Problem List   Diagnosis     Moderate dysplasia of cervix (ROSAURA II)     Fibroid uterus     Dysmenorrhea     CARDIOVASCULAR SCREENING; LDL GOAL LESS THAN 160     Female infertility of other specified origin     Iron deficiency anemia due to chronic blood loss     Past Surgical History:   Procedure Laterality Date     CRYOTHERAPY, CERVICAL  12/2012     LASER CO2 VULVA  12/2011    small condyloma       MYOMECTOMY UTERUS  8/3/2011    2.5 cm/ 4.5 cm/ 6.5 and 7.5 cm  surgery performed in [place].         Social History   Substance Use Topics     Smoking status: Never Smoker     Smokeless tobacco: Never Used     Alcohol use No     Family History   Problem Relation Age of Onset     Hypertension Mother      Asthma Mother      Ulcerative Colitis Mother      Hypertension Father          Reviewed and updated as needed this visit by clinical staff  Allergies  Meds       ROS:  Constitutional, HEENT, cardiovascular, pulmonary, gi and gu systems are negative, except as otherwise noted.      OBJECTIVE:     BP 98/58  Pulse 75  Temp 97.4  F (36.3  C) (Oral)  Resp 14  Wt 141 lb 8 oz (64.2 kg)  SpO2 99%  BMI 24.76 kg/m2  Body mass index is 24.76 kg/(m^2).  GENERAL: healthy, alert and no distress  NECK: no adenopathy and thyroid normal to palpation  RESP: lungs clear to  auscultation - no rales, rhonchi or wheezes  CV: regular rate and rhythm, no murmur, click or rub, no peripheral edema  ABDOMEN: soft, nontender, no masses and bowel sounds normal  MS: no gross musculoskeletal defects noted, no edema    Diagnostic Test Results:  none     ASSESSMENT/PLAN:     (D50.9) Iron deficiency anemia, unspecified iron deficiency anemia type  (primary encounter diagnosis)  Comment: Discussed pathophysiology of iron deficiency anemia, iron replacement. She is concerned that her iron is on the higher end of normal. She also has fibroids but bleeding not as heavy as before; taking some supplements. Recommended stopping iron and recheck in 1 month.  Plan: Iron, Hemoglobin, Ferritin    (N94.6) Dysmenorrhea  Comment: Possibly from fibroids, a contributor to anemia. Reports improvement at this time.     (D25.9) Uterine leiomyoma, unspecified location  Comment: With heavy bleeding but slowed down lately  Plan: Monitoring Hgb.    Follow up in 1 month or sooner with concerns    Israel Ramirez MD  [facility]

## 2018-03-07 NOTE — TELEPHONE ENCOUNTER
Patient asking how long does she need to take this and how many units.  Elma Toscano  Team Ophelia,

## 2018-03-07 NOTE — TELEPHONE ENCOUNTER
Patient responding to Dr. Ramirez's Whooch message regarding iron supplement (see 3/4/18 mychart encounter)    Routing to Dr. Ramirez to please see patient's message below    Carmelo Aaron RN

## 2018-04-06 DIAGNOSIS — D50.9 IRON DEFICIENCY ANEMIA, UNSPECIFIED IRON DEFICIENCY ANEMIA TYPE: ICD-10-CM

## 2018-04-06 LAB
FERRITIN SERPL-MCNC: 10 NG/ML (ref 12–150)
HGB BLD-MCNC: 11.5 G/DL (ref 11.7–15.7)
IRON SERPL-MCNC: 80 UG/DL (ref 35–180)

## 2018-04-06 PROCEDURE — 83540 ASSAY OF IRON: CPT | Performed by: FAMILY MEDICINE

## 2018-04-06 PROCEDURE — 85018 HEMOGLOBIN: CPT | Performed by: FAMILY MEDICINE

## 2018-04-06 PROCEDURE — 36415 COLL VENOUS BLD VENIPUNCTURE: CPT | Performed by: FAMILY MEDICINE

## 2018-04-06 PROCEDURE — 82728 ASSAY OF FERRITIN: CPT | Performed by: FAMILY MEDICINE

## 2018-06-19 ENCOUNTER — OFFICE VISIT (OUTPATIENT)
Dept: FAMILY MEDICINE | Facility: CLINIC | Age: 38
End: 2018-06-19

## 2018-06-19 VITALS
OXYGEN SATURATION: 100 % | DIASTOLIC BLOOD PRESSURE: 64 MMHG | TEMPERATURE: 97.9 F | HEIGHT: 63 IN | HEART RATE: 85 BPM | SYSTOLIC BLOOD PRESSURE: 104 MMHG | BODY MASS INDEX: 24.59 KG/M2 | WEIGHT: 138.8 LBS | RESPIRATION RATE: 13 BRPM

## 2018-06-19 DIAGNOSIS — R53.83 FATIGUE, UNSPECIFIED TYPE: Primary | ICD-10-CM

## 2018-06-19 DIAGNOSIS — D50.9 IRON DEFICIENCY ANEMIA, UNSPECIFIED IRON DEFICIENCY ANEMIA TYPE: ICD-10-CM

## 2018-06-19 DIAGNOSIS — E55.9 VITAMIN D DEFICIENCY: ICD-10-CM

## 2018-06-19 LAB
ANION GAP SERPL CALCULATED.3IONS-SCNC: 6 MMOL/L (ref 3–14)
BUN SERPL-MCNC: 8 MG/DL (ref 7–30)
CALCIUM SERPL-MCNC: 8.4 MG/DL (ref 8.5–10.1)
CHLORIDE SERPL-SCNC: 107 MMOL/L (ref 94–109)
CO2 SERPL-SCNC: 29 MMOL/L (ref 20–32)
CREAT SERPL-MCNC: 0.68 MG/DL (ref 0.52–1.04)
DEPRECATED CALCIDIOL+CALCIFEROL SERPL-MC: 26 UG/L (ref 20–75)
GFR SERPL CREATININE-BSD FRML MDRD: >90 ML/MIN/1.7M2
GLUCOSE SERPL-MCNC: 88 MG/DL (ref 70–99)
HGB BLD-MCNC: 12.1 G/DL (ref 11.7–15.7)
IRON SERPL-MCNC: 27 UG/DL (ref 35–180)
POTASSIUM SERPL-SCNC: 4.5 MMOL/L (ref 3.4–5.3)
SODIUM SERPL-SCNC: 142 MMOL/L (ref 133–144)

## 2018-06-19 PROCEDURE — 83540 ASSAY OF IRON: CPT | Performed by: FAMILY MEDICINE

## 2018-06-19 PROCEDURE — 82306 VITAMIN D 25 HYDROXY: CPT | Performed by: FAMILY MEDICINE

## 2018-06-19 PROCEDURE — 99213 OFFICE O/P EST LOW 20 MIN: CPT | Performed by: FAMILY MEDICINE

## 2018-06-19 PROCEDURE — 85018 HEMOGLOBIN: CPT | Performed by: FAMILY MEDICINE

## 2018-06-19 PROCEDURE — 36415 COLL VENOUS BLD VENIPUNCTURE: CPT | Performed by: FAMILY MEDICINE

## 2018-06-19 PROCEDURE — 80048 BASIC METABOLIC PNL TOTAL CA: CPT | Performed by: FAMILY MEDICINE

## 2018-06-19 NOTE — LETTER
June 19, 2018      Ethel Murillo  [address]   [facility] [address]        To Whom It May Concern:    Ethel Murillo was seen in our clinic. She may return to work 06/20/2018 without restrictions.      Sincerely,        Israel Ramirez MD

## 2018-06-19 NOTE — PROGRESS NOTES
SUBJECTIVE:   Ethel Murillo is a 37 year old female who presents to clinic today for the following health issues:    Chief Complaint   Patient presents with     Fatigue     Nausea     Lab Only     Patient would like to talk about blood levels. and patient would like to review the blood work from the 03/06/2018 and 04/06/2018     Fatigue:  Fatigue, nausea, palpitations. She was a little bit dizzy.  Denies any stress; lost her mother 6 months ago. Sleeping and eating well.  Has thyroid check a few months ago which was normal  Denies any depression.  LMP: first 2 days heavy and last 2 not heavy.     Vit D deficiency:   Been on supplement wants recheck    Iron deficiency:    Had uterine fibroids associated with heavy regular period    Not taking iron at this time; took some between Dec 17 and April 18 and recheck iron was very high so stopped. Taking a natural supplement that has some iron.    Problem list and histories reviewed & adjusted, as indicated.  Additional history: as documented    Patient Active Problem List   Diagnosis     Moderate dysplasia of cervix (ROSAURA II)     Fibroid uterus     Dysmenorrhea     CARDIOVASCULAR SCREENING; LDL GOAL LESS THAN 160     Female infertility of other specified origin     Iron deficiency anemia due to chronic blood loss     Past Surgical History:   Procedure Laterality Date     CRYOTHERAPY, CERVICAL  12/2012     LASER CO2 VULVA  12/2011    small condyloma       MYOMECTOMY UTERUS  8/3/2011    2.5 cm/ 4.5 cm/ 6.5 and 7.5 cm  surgery performed in [place].         Social History   Substance Use Topics     Smoking status: Never Smoker     Smokeless tobacco: Never Used     Alcohol use No     Family History   Problem Relation Age of Onset     Hypertension Mother      Asthma Mother      Ulcerative Colitis Mother      Hypertension Father            Reviewed and updated as needed this visit by clinical staff  Tobacco  Allergies  Meds  Med Hx  Surg Hx  Fam Hx  Soc Hx     "  ROS:  Constitutional, HEENT, cardiovascular, pulmonary, gi and gu systems are negative, except as otherwise noted.    OBJECTIVE:     /64 (BP Location: Left arm, Patient Position: Chair, Cuff Size: Adult Regular)  Pulse 85  Temp 97.9  F (36.6  C) (Oral)  Resp 13  Ht 5' 3\" (1.6 m)  Wt 138 lb 12.8 oz (63 kg)  LMP 05/26/2018 (Exact Date)  SpO2 100%  Breastfeeding? No  BMI 24.59 kg/m2  Body mass index is 24.59 kg/(m^2).  GENERAL: healthy, alert and no distress  NECK: no adenopathy and thyroid normal to palpation  RESP: lungs clear to auscultation - no rales, rhonchi or wheezes  CV: regular rate and rhythm, no murmur, click or rub, no peripheral edema   ABDOMEN: soft, nontender, no masses and bowel sounds normal  MS: no gross musculoskeletal defects noted, no edema  NEURO: Normal strength and tone, mentation intact and speech normal  PSYCH: mentation appears normal, affect normal/bright    Diagnostic Test Results:  none     ASSESSMENT/PLAN:     (R53.83) Fatigue, unspecified type  (primary encounter diagnosis)  Comment: Differentials: Anemia, Stress, Hyperglycemia. Will check labs  Plan: Hemoglobin, Basic metabolic panel    (E55.9) Vitamin D deficiency  Comment: Had a low vit D level been on high dose supplement, recheck levels today  Plan: Vitamin D Deficiency    (D50.9) Iron deficiency anemia, unspecified iron deficiency anemia type  Comment: Check Hgb and iron  Plan: Iron    Israel Ramirez MD  [facility]  "

## 2018-06-19 NOTE — MR AVS SNAPSHOT
After Visit Summary   6/19/2018    Ehtel Murillo    MRN: [id number]           Patient Information     Date Of Birth          1980        Visit Information        Provider Department      6/19/2018 9:00 AM Israel Ramirez MD [facility]        Today's Diagnoses     Fatigue, unspecified type    -  1    Vitamin D deficiency        Iron deficiency anemia, unspecified iron deficiency anemia type          Care Instructions    [facility]    If you have any questions regarding to your visit please contact your care team:       Team Purple:   Clinic Hours Telephone Number   Dr. Mariya Tidwell   7am-7pm  Monday - Thursday   7am-5pm  Fridays  [phone]  (Appointment scheduling available 24/7)    Questions about your recent visit?   Team Line:  [phone]   Urgent Care - [place] and [facility] - 11am-9pm Monday-Friday Saturday-Sunday- 9am-5pm   [place] - 5pm-9pm Monday-Friday Saturday-Sunday- 9am-5pm  [phone] - [place]  [phone] [facility]       What options do I have for a visit other than an office visit? We offer electronic visits (e-visits) and telephone visits, when medically appropriate.  Please check with your medical insurance to see if these types of visits are covered, as you will be responsible for any charges that are not paid by your insurance.      You can use [employer] (secure electronic communication) to access to your chart, send your primary care provider a message, or make an appointment. Ask a team member how to get started.     For a price quote for your services, please call our Consumer Price Line at [phone] or our Imaging Cost estimation line at [phone] (for imaging tests).    Melania Stokes MA            Follow-ups after your visit        Who to contact     If you have questions or need follow up information about today's clinic visit or your schedule please  "contact [facility] directly at [phone].  Normal or non-critical lab and imaging results will be communicated to you by MyChart, letter or phone within 4 business days after the clinic has received the results. If you do not hear from us within 7 days, please contact the clinic through [employer] or phone. If you have a critical or abnormal lab result, we will notify you by phone as soon as possible.  Submit refill requests through [employer] or call your pharmacy and they will forward the refill request to us. Please allow 3 business days for your refill to be completed.          Additional Information About Your Visit        [employer] Information     [employer] gives you secure access to your electronic health record. If you see a primary care provider, you can also send messages to your care team and make appointments. If you have questions, please call your primary care clinic.  If you do not have a primary care provider, please call [phone] and they will assist you.        Care EveryWhere ID     This is your Care EveryWhere ID. This could be used by other organizations to access your [facility] medical records  [id number]        Your Vitals Were     Pulse Temperature Respirations Height Last Period Pulse Oximetry    85 97.9  F (36.6  C) (Oral) 13 5' 3\" (1.6 m) 05/26/2018 (Exact Date) 100%    Breastfeeding? BMI (Body Mass Index)                No 24.59 kg/m2           Blood Pressure from Last 3 Encounters:   06/19/18 104/64   03/06/18 98/58   02/13/18 102/65    Weight from Last 3 Encounters:   06/19/18 138 lb 12.8 oz (63 kg)   03/06/18 141 lb 8 oz (64.2 kg)   02/13/18 141 lb 8 oz (64.2 kg)              We Performed the Following     Basic metabolic panel     Hemoglobin     Iron     Vitamin D Deficiency        Primary Care Provider Office Phone # Fax #    Nico Cottrell [phone]       [address] [facility]  [place] [address]        Equal Access to Services     SHANI WHARTON " AH: Russ Bhardwaj, wamarciada luqadaha, qajosefata kacortney arnolddianagloria mcmahonemersonchevy gonzales. So [facility] [phone].    ATENCIÓN: Si habla español, tiene a howe disposición servicios gratuitos de asistencia lingüística. Llame al [phone].    We comply with applicable federal civil rights laws and [place] laws. We do not discriminate on the basis of race, color, national origin, age, disability, sex, sexual orientation, or gender identity.            Thank you!     Thank you for choosing [facility]  for your care. Our goal is always to provide you with excellent care. Hearing back from our patients is one way we can continue to improve our services. Please take a few minutes to complete the written survey that you may receive in the mail after your visit with us. Thank you!             Your Updated Medication List - Protect others around you: Learn how to safely use, store and throw away your medicines at www.disposemymeds.org.          This list is accurate as of 6/19/18  9:30 AM.  Always use your most recent med list.                   Brand Name Dispense Instructions for use Diagnosis    cholecalciferol 04366 units capsule    VITAMIN D3    8 capsule    Take 1 capsule (50,000 Units) by mouth once a week    Vitamin D deficiency       IRON SUPPLEMENT PO      Take by mouth 2 times daily (with meals)        naproxen 500 MG tablet    NAPROSYN    60 tablet    Take 1 tablet (500 mg) by mouth 2 times daily as needed    Dysmenorrhea

## 2018-06-19 NOTE — NURSING NOTE
"Chief Complaint   Patient presents with     Fatigue     Nausea     Lab Only     Patient would like to talk about blood levels. and patient would like to review the blood work from the 03/06/2018 and 04/06/2018     Initial /64 (BP Location: Left arm, Patient Position: Chair, Cuff Size: Adult Regular)  Pulse 85  Temp 97.9  F (36.6  C) (Oral)  Resp 13  Ht 5' 3\" (1.6 m)  Wt 138 lb 12.8 oz (63 kg)  LMP 05/26/2018 (Exact Date)  SpO2 100%  Breastfeeding? No  BMI 24.59 kg/m2 Estimated body mass index is 24.59 kg/(m^2) as calculated from the following:    Height as of this encounter: 5' 3\" (1.6 m).    Weight as of this encounter: 138 lb 12.8 oz (63 kg).  BP completed using cuff size: regular    Abrahan Cooley  "

## 2018-06-21 ENCOUNTER — MYC MEDICAL ADVICE (OUTPATIENT)
Dept: FAMILY MEDICINE | Facility: CLINIC | Age: 38
End: 2018-06-21

## 2018-06-21 DIAGNOSIS — D50.0 IRON DEFICIENCY ANEMIA DUE TO CHRONIC BLOOD LOSS: Primary | ICD-10-CM

## 2018-06-21 RX ORDER — FERROUS SULFATE 325(65) MG
325 TABLET ORAL 2 TIMES DAILY
Qty: 180 TABLET | Refills: 0 | Status: SHIPPED | OUTPATIENT
Start: 2018-06-21 | End: 2020-01-31

## 2018-06-21 NOTE — TELEPHONE ENCOUNTER
Discussed results with patient and sent refill for iron.  Recheck iron/Ferritin/Hgb in 1-2 months

## 2018-06-21 NOTE — TELEPHONE ENCOUNTER
Dr. Ramirez,  Please see [employer] message below and advise. Thanks.    Katalina Chung RN  [facility]

## 2018-08-02 DIAGNOSIS — D50.0 IRON DEFICIENCY ANEMIA DUE TO CHRONIC BLOOD LOSS: ICD-10-CM

## 2018-08-02 LAB
FERRITIN SERPL-MCNC: 12 NG/ML (ref 12–150)
HGB BLD-MCNC: 12.4 G/DL (ref 11.7–15.7)
IRON SERPL-MCNC: 66 UG/DL (ref 35–180)

## 2018-08-02 PROCEDURE — 82728 ASSAY OF FERRITIN: CPT | Performed by: FAMILY MEDICINE

## 2018-08-02 PROCEDURE — 36415 COLL VENOUS BLD VENIPUNCTURE: CPT | Performed by: FAMILY MEDICINE

## 2018-08-02 PROCEDURE — 85018 HEMOGLOBIN: CPT | Performed by: FAMILY MEDICINE

## 2018-08-02 PROCEDURE — 83540 ASSAY OF IRON: CPT | Performed by: FAMILY MEDICINE

## 2018-12-12 NOTE — PROGRESS NOTES
I have discontinued all orders as I am entirely unaware of need for additional treatment plan , no recent contacts with patient.     If further communication regarding this patient necessary, please reroute for additional lab orders to be placed or other input on my part.    Cedric Patel MD

## 2019-02-04 ENCOUNTER — OFFICE VISIT (OUTPATIENT)
Dept: FAMILY MEDICINE | Facility: CLINIC | Age: 39
End: 2019-02-04
Payer: COMMERCIAL

## 2019-02-04 VITALS
BODY MASS INDEX: 27 KG/M2 | HEIGHT: 63 IN | DIASTOLIC BLOOD PRESSURE: 74 MMHG | SYSTOLIC BLOOD PRESSURE: 126 MMHG | OXYGEN SATURATION: 99 % | HEART RATE: 100 BPM | RESPIRATION RATE: 13 BRPM | TEMPERATURE: 97.2 F | WEIGHT: 152.4 LBS

## 2019-02-04 DIAGNOSIS — R22.1 LUMP IN NECK: Primary | ICD-10-CM

## 2019-02-04 DIAGNOSIS — R25.2 LEG CRAMPS: ICD-10-CM

## 2019-02-04 DIAGNOSIS — E78.5 HYPERLIPIDEMIA LDL GOAL <130: ICD-10-CM

## 2019-02-04 DIAGNOSIS — E55.9 VITAMIN D DEFICIENCY: ICD-10-CM

## 2019-02-04 DIAGNOSIS — R21 RASH: ICD-10-CM

## 2019-02-04 DIAGNOSIS — R74.8 ELEVATED LIVER ENZYMES: ICD-10-CM

## 2019-02-04 LAB
ALT SERPL W P-5'-P-CCNC: 35 U/L (ref 0–50)
AST SERPL W P-5'-P-CCNC: 21 U/L (ref 0–45)
CHOLEST SERPL-MCNC: 217 MG/DL
DEPRECATED CALCIDIOL+CALCIFEROL SERPL-MC: 16 UG/L (ref 20–75)
HDLC SERPL-MCNC: 59 MG/DL
LDLC SERPL CALC-MCNC: 137 MG/DL
MAGNESIUM SERPL-MCNC: 2.1 MG/DL (ref 1.6–2.3)
NONHDLC SERPL-MCNC: 158 MG/DL
TRIGL SERPL-MCNC: 107 MG/DL

## 2019-02-04 PROCEDURE — 84450 TRANSFERASE (AST) (SGOT): CPT | Performed by: FAMILY MEDICINE

## 2019-02-04 PROCEDURE — 83735 ASSAY OF MAGNESIUM: CPT | Performed by: FAMILY MEDICINE

## 2019-02-04 PROCEDURE — 84460 ALANINE AMINO (ALT) (SGPT): CPT | Performed by: FAMILY MEDICINE

## 2019-02-04 PROCEDURE — 84630 ASSAY OF ZINC: CPT | Mod: 90 | Performed by: FAMILY MEDICINE

## 2019-02-04 PROCEDURE — 82306 VITAMIN D 25 HYDROXY: CPT | Performed by: FAMILY MEDICINE

## 2019-02-04 PROCEDURE — 99000 SPECIMEN HANDLING OFFICE-LAB: CPT | Performed by: FAMILY MEDICINE

## 2019-02-04 PROCEDURE — 36415 COLL VENOUS BLD VENIPUNCTURE: CPT | Performed by: FAMILY MEDICINE

## 2019-02-04 PROCEDURE — 99214 OFFICE O/P EST MOD 30 MIN: CPT | Performed by: FAMILY MEDICINE

## 2019-02-04 PROCEDURE — 80061 LIPID PANEL: CPT | Performed by: FAMILY MEDICINE

## 2019-02-04 RX ORDER — TRIAMCINOLONE ACETONIDE 1 MG/G
CREAM TOPICAL 2 TIMES DAILY
Qty: 45 G | Refills: 1 | Status: SHIPPED | OUTPATIENT
Start: 2019-02-04 | End: 2019-07-26

## 2019-02-04 ASSESSMENT — MIFFLIN-ST. JEOR: SCORE: 1330.41

## 2019-02-04 NOTE — PATIENT INSTRUCTIONS
[facility]    If you have any questions regarding to your visit please contact your care team:     Team Pink:   Clinic Hours Telephone Number   Internal Medicine:  Dr. Clarisse Webb NP       7am-7pm  Monday - Thursday   7am-5pm  Fridays  [phone]  (Appointment scheduling available 24/7)    Questions about your visit?  Team Line  [phone]   Urgent Care - [place] [facility] - 11am-9pm Monday-Friday Saturday-Sunday- 9am-5pm   [place] - 5pm-9pm Monday-Friday Saturday-Sunday- 9am-5pm  [phone] - [place]   [phone] - [place]       What options do I have for visits at the clinic other than the traditional office visit?  To expand how we care for you, many of our providers are utilizing electronic visits (e-visits) and telephone visits, when medically appropriate, for interactions with their patients rather than a visit in the clinic.   We also offer nurse visits for many medical concerns. Just like any other service, we will bill your insurance company for this type of visit based on time spent on the phone with your provider. Not all insurance companies cover these visits. Please check with your medical insurance if this type of visit is covered. You will be responsible for any charges that are not paid by your insurance.      E-visits via [employer]:  generally incur a $35.00 fee.  Telephone visits:  Time spent on the phone: *charged based on time that is spent on the phone in increments of 10 minutes. Estimated cost:   5-10 mins $30.00   11-20 mins. $59.00   21-30 mins. $85.00   Use [employer] (secure email communication and access to your chart) to send your primary care provider a message or make an appointment. Ask someone on your Team how to sign up for [employer].    For a Price Quote for your services, please call our Consumer Price Line at [phone].    As always, Thank you for trusting us with your health care  needs    Abrahan Cooley

## 2019-02-04 NOTE — PROGRESS NOTES
SUBJECTIVE:   Ethel Murillo is a 38 year old female who presents to clinic today for the following health issues:          Chief Complaint   Patient presents with     Neck Problem       Lump/ Mass in the neck area     Lab Only       Lipids, B-d, Liver, magnesium, zinc, tested for strep.        Neck Lump: a few days ago   Woke up with a pain and felt a lump on the left side.   Has rash in the neck.    Rash in Neck:   Comes on with periods.     Elevated liver enzymes:   Has had elevated AST, ALT would like to recheck.    Leg cramps:     Worried about electrolytes, iron levels, magnesium, Zinc and wants everything checked.    Vitamin D deficiency:    Not checked recently      Problem list and histories reviewed & adjusted, as indicated.  Additional history: as documented     Patient Active Problem List   Diagnosis     Moderate dysplasia of cervix (ROSAURA II)     Fibroid uterus     Dysmenorrhea     CARDIOVASCULAR SCREENING; LDL GOAL LESS THAN 160     Female infertility of other specified origin     Iron deficiency anemia due to chronic blood loss     Past Surgical History:   Procedure Laterality Date     CRYOTHERAPY, CERVICAL  12/2012     LASER CO2 VULVA  12/2011    small condyloma       MYOMECTOMY UTERUS  8/3/2011    2.5 cm/ 4.5 cm/ 6.5 and 7.5 cm  surgery performed in [place].         Social History     Tobacco Use     Smoking status: Never Smoker     Smokeless tobacco: Never Used   Substance Use Topics     Alcohol use: No     Family History   Problem Relation Age of Onset     Hypertension Mother      Asthma Mother      Ulcerative Colitis Mother      Hypertension Father          Tobacco  Allergies  Meds  Med Hx  Surg Hx  Fam Hx  Soc Hx      Reviewed and updated as needed this visit by Provider     ROS:  Constitutional, HEENT, cardiovascular, pulmonary, gi and gu systems are negative, except as otherwise noted.    OBJECTIVE:     /74 (BP Location: Left arm, Patient Position: Chair, Cuff Size: Adult  "Regular)   Pulse 100   Temp 97.2  F (36.2  C) (Oral)   Resp 13   Ht 1.6 m (5' 3\")   Wt 69.1 kg (152 lb 6.4 oz)   LMP 01/14/2019   SpO2 99%   Breastfeeding? No   BMI 27.00 kg/m    Body mass index is 27 kg/m .  GENERAL: healthy, alert and troubled by a myriad of concerns.  NECK: no adenopathy and thyroid normal to palpation  RESP: lungs clear to auscultation - no rales, rhonchi or wheezes  CV: regular rate and rhythm, normal S1 S2, no S3 or S4, no murmur, click or rub  ABDOMEN: soft, nontender, no masses and bowel sounds normal  MS: no gross musculoskeletal defects noted, no edema    Diagnostic Test Results:  none     ASSESSMENT/PLAN:     (R22.1) Lump in neck  (primary encounter diagnosis)  Comment: Posterior cervical adenopathy. Discussed this as a likely reactive process  Plan: Resolving. Reassurance.    (R21) Rash  Comment: Dry skin  Plan: Keep skin moist. Hydrocortisone as needed    (R74.8) Elevated liver enzymes  Comment: Will recheck  Plan: ALT, AST    (E55.9) Vitamin D deficiency  Comment: Could be cause of fatigue  Plan: Vitamin D Deficiency    (R25.2) Leg cramps  Comment: Charley horse. Iron levels checked recently and were nrmal. Will check Magnesium and Zinc  Plan: Magnesium, Zinc    (E78.5) Hyperlipidemia LDL goal <130  Comment: Worried about levels. Fasting LDL.  Plan: Lipid Profile (Chol, Trig, HDL, LDL calc)    Israel Ramirez MD  [facility]       "

## 2019-02-06 LAB — ZINC SERPL-MCNC: 66 UG/DL (ref 60–120)

## 2019-02-12 ENCOUNTER — OFFICE VISIT (OUTPATIENT)
Dept: OBGYN | Facility: CLINIC | Age: 39
End: 2019-02-12
Payer: COMMERCIAL

## 2019-02-12 VITALS
SYSTOLIC BLOOD PRESSURE: 100 MMHG | DIASTOLIC BLOOD PRESSURE: 64 MMHG | BODY MASS INDEX: 26.39 KG/M2 | OXYGEN SATURATION: 100 % | HEART RATE: 78 BPM | WEIGHT: 149 LBS

## 2019-02-12 DIAGNOSIS — D25.2 INTRAMURAL, SUBMUCOUS, AND SUBSEROUS LEIOMYOMA OF UTERUS: Primary | ICD-10-CM

## 2019-02-12 DIAGNOSIS — D25.1 INTRAMURAL, SUBMUCOUS, AND SUBSEROUS LEIOMYOMA OF UTERUS: Primary | ICD-10-CM

## 2019-02-12 DIAGNOSIS — D25.0 INTRAMURAL, SUBMUCOUS, AND SUBSEROUS LEIOMYOMA OF UTERUS: Primary | ICD-10-CM

## 2019-02-12 PROCEDURE — 99214 OFFICE O/P EST MOD 30 MIN: CPT | Performed by: OBSTETRICS & GYNECOLOGY

## 2019-02-12 RX ORDER — CHOLECALCIFEROL (VITAMIN D3) 50 MCG
1 TABLET ORAL DAILY
COMMUNITY

## 2019-02-13 ENCOUNTER — ANCILLARY PROCEDURE (OUTPATIENT)
Dept: ULTRASOUND IMAGING | Facility: CLINIC | Age: 39
End: 2019-02-13
Attending: OBSTETRICS & GYNECOLOGY
Payer: COMMERCIAL

## 2019-02-13 DIAGNOSIS — D25.0 INTRAMURAL, SUBMUCOUS, AND SUBSEROUS LEIOMYOMA OF UTERUS: ICD-10-CM

## 2019-02-13 DIAGNOSIS — D25.1 INTRAMURAL, SUBMUCOUS, AND SUBSEROUS LEIOMYOMA OF UTERUS: ICD-10-CM

## 2019-02-13 DIAGNOSIS — D25.2 INTRAMURAL, SUBMUCOUS, AND SUBSEROUS LEIOMYOMA OF UTERUS: ICD-10-CM

## 2019-02-13 PROCEDURE — 76856 US EXAM PELVIC COMPLETE: CPT

## 2019-02-13 PROCEDURE — 76830 TRANSVAGINAL US NON-OB: CPT

## 2019-02-20 ENCOUNTER — MYC MEDICAL ADVICE (OUTPATIENT)
Dept: OBGYN | Facility: CLINIC | Age: 39
End: 2019-02-20

## 2019-02-20 ENCOUNTER — OFFICE VISIT (OUTPATIENT)
Dept: OBGYN | Facility: CLINIC | Age: 39
End: 2019-02-20
Payer: COMMERCIAL

## 2019-02-20 ENCOUNTER — TELEPHONE (OUTPATIENT)
Dept: OBGYN | Facility: CLINIC | Age: 39
End: 2019-02-20

## 2019-02-20 VITALS
DIASTOLIC BLOOD PRESSURE: 71 MMHG | OXYGEN SATURATION: 98 % | BODY MASS INDEX: 26.36 KG/M2 | SYSTOLIC BLOOD PRESSURE: 106 MMHG | HEART RATE: 79 BPM | WEIGHT: 148.8 LBS

## 2019-02-20 DIAGNOSIS — D25.2 INTRAMURAL, SUBMUCOUS, AND SUBSEROUS LEIOMYOMA OF UTERUS: Primary | ICD-10-CM

## 2019-02-20 DIAGNOSIS — D25.1 INTRAMURAL, SUBMUCOUS, AND SUBSEROUS LEIOMYOMA OF UTERUS: Primary | ICD-10-CM

## 2019-02-20 DIAGNOSIS — N94.6 DYSMENORRHEA: ICD-10-CM

## 2019-02-20 DIAGNOSIS — R10.2 PELVIC PAIN IN FEMALE: ICD-10-CM

## 2019-02-20 DIAGNOSIS — D25.0 INTRAMURAL, SUBMUCOUS, AND SUBSEROUS LEIOMYOMA OF UTERUS: Primary | ICD-10-CM

## 2019-02-20 PROCEDURE — 99214 OFFICE O/P EST MOD 30 MIN: CPT | Performed by: OBSTETRICS & GYNECOLOGY

## 2019-02-20 NOTE — TELEPHONE ENCOUNTER
Associated Diagnoses     Intramural, submucous, and subserous leiomyoma of uterus  - Primary       Order Questions     Question Answer Comment   Procedure name(s) - multi select myomectomy    Is this a multi surgeon case? No    Laterality N/A    Explant? No    Reason for procedure uterine leiomyoma    Urgency of Surgery Patient Choice/Next Available    Location of Case: [facility]    Surgeon Procedure Time (incision to closure) in minutes (per procedure as applicable) 150    Note:  Surgical Case Time Needed (in minutes)   Patient Class (for admit prior to surgery, specify number of days in comments): Surgery admit    Length of Stay: 2 days    Anesthesia General    H&P To Be Completed By: PCP     needed? Yes    What gaurang? rimma dejesus    Is a PAC Consult order needed for the case? No    Note:  Preoperative Assessment Center   Post-Op Appointment 6 weeks    Vendor Needed? No    Spinal Cord Monitoring? No

## 2019-02-20 NOTE — TELEPHONE ENCOUNTER
Surgery Scheduled.    Date of Surgery 3/18/19 Time of Surgery 7:30 am  Procedure: Myomectomy  H[place]urgical [place] [facility]  Surgeon: Dr. Cottrell  Type of Anesthesia Anticipated: General  Pre-op: To be scheduled with PCP   6 week post op To be scheduled with Dr. Cottrell at  OB  Pre-certification 2/27/19  Consent signed: NA  Hospital Stay 2 days       [facility] surgery packet mailed to patient's home address.  Patient instructed NPO 12 hours prior to surgery, arrive 1 1/2 hours prior to surgery, must not have a .  Patient understood and agrees to the plan.      Surgery Pre-Certification    Medical Record Number: [id number]  Ethel Murillo  YOB: 1980   Phone: [phone] (home)   Primary Provider: Nico Cottrell    Reason for Admit:  Uterine Fibroid    Surgeon: ELDA Cottrell MD  Surgical Procedure: Myomectomy  ICD-9 Coded: D25.1  Date of Surgery: 3/18/19  Consent signed? N/A     Hospital: [facility]  Inpatient- Length of stay:  2 days.    Requestor:  Mary Dc     Location:  [facility]    Chioma Brice CMA

## 2019-02-21 NOTE — TELEPHONE ENCOUNTER
Patient is to be scheduled for myomectomy in April sometime.  Forwarded to provider to advise on pap questions prior to scheduling.

## 2019-02-27 ENCOUNTER — OFFICE VISIT (OUTPATIENT)
Dept: OBGYN | Facility: CLINIC | Age: 39
End: 2019-02-27
Payer: COMMERCIAL

## 2019-02-27 VITALS
WEIGHT: 148.4 LBS | OXYGEN SATURATION: 99 % | DIASTOLIC BLOOD PRESSURE: 71 MMHG | SYSTOLIC BLOOD PRESSURE: 109 MMHG | BODY MASS INDEX: 26.29 KG/M2 | HEART RATE: 76 BPM

## 2019-02-27 DIAGNOSIS — D25.0 INTRAMURAL, SUBMUCOUS, AND SUBSEROUS LEIOMYOMA OF UTERUS: Primary | ICD-10-CM

## 2019-02-27 DIAGNOSIS — D25.2 INTRAMURAL, SUBMUCOUS, AND SUBSEROUS LEIOMYOMA OF UTERUS: Primary | ICD-10-CM

## 2019-02-27 DIAGNOSIS — N94.6 DYSMENORRHEA: ICD-10-CM

## 2019-02-27 DIAGNOSIS — D25.1 INTRAMURAL, SUBMUCOUS, AND SUBSEROUS LEIOMYOMA OF UTERUS: Primary | ICD-10-CM

## 2019-02-27 DIAGNOSIS — R10.2 PELVIC PAIN IN FEMALE: ICD-10-CM

## 2019-02-27 PROCEDURE — 99214 OFFICE O/P EST MOD 30 MIN: CPT | Performed by: OBSTETRICS & GYNECOLOGY

## 2019-03-04 ENCOUNTER — OFFICE VISIT (OUTPATIENT)
Dept: FAMILY MEDICINE | Facility: CLINIC | Age: 39
End: 2019-03-04
Payer: COMMERCIAL

## 2019-03-04 VITALS
RESPIRATION RATE: 12 BRPM | DIASTOLIC BLOOD PRESSURE: 68 MMHG | HEART RATE: 87 BPM | SYSTOLIC BLOOD PRESSURE: 103 MMHG | OXYGEN SATURATION: 98 % | BODY MASS INDEX: 26.65 KG/M2 | TEMPERATURE: 99.3 F | WEIGHT: 150.4 LBS | HEIGHT: 63 IN

## 2019-03-04 DIAGNOSIS — Z23 NEED FOR PROPHYLACTIC VACCINATION WITH TETANUS-DIPHTHERIA (TD): ICD-10-CM

## 2019-03-04 DIAGNOSIS — D50.9 IRON DEFICIENCY ANEMIA, UNSPECIFIED IRON DEFICIENCY ANEMIA TYPE: ICD-10-CM

## 2019-03-04 DIAGNOSIS — D25.9 UTERINE LEIOMYOMA, UNSPECIFIED LOCATION: ICD-10-CM

## 2019-03-04 DIAGNOSIS — Z01.818 PREOP GENERAL PHYSICAL EXAM: Primary | ICD-10-CM

## 2019-03-04 DIAGNOSIS — Z82.49 FAMILY HISTORY OF DVT: ICD-10-CM

## 2019-03-04 LAB
ANION GAP SERPL CALCULATED.3IONS-SCNC: 7 MMOL/L (ref 3–14)
BASOPHILS # BLD AUTO: 0 10E9/L (ref 0–0.2)
BASOPHILS NFR BLD AUTO: 0 %
BUN SERPL-MCNC: 11 MG/DL (ref 7–30)
CALCIUM SERPL-MCNC: 8.6 MG/DL (ref 8.5–10.1)
CHLORIDE SERPL-SCNC: 106 MMOL/L (ref 94–109)
CO2 SERPL-SCNC: 27 MMOL/L (ref 20–32)
CREAT SERPL-MCNC: 0.65 MG/DL (ref 0.52–1.04)
DIFFERENTIAL METHOD BLD: NORMAL
EOSINOPHIL # BLD AUTO: 0.1 10E9/L (ref 0–0.7)
EOSINOPHIL NFR BLD AUTO: 1.7 %
ERYTHROCYTE [DISTWIDTH] IN BLOOD BY AUTOMATED COUNT: 12.8 % (ref 10–15)
GFR SERPL CREATININE-BSD FRML MDRD: >90 ML/MIN/{1.73_M2}
GLUCOSE SERPL-MCNC: 89 MG/DL (ref 70–99)
HCG UR QL: NEGATIVE
HCT VFR BLD AUTO: 39.9 % (ref 35–47)
HGB BLD-MCNC: 13.1 G/DL (ref 11.7–15.7)
LYMPHOCYTES # BLD AUTO: 1 10E9/L (ref 0.8–5.3)
LYMPHOCYTES NFR BLD AUTO: 24.4 %
MCH RBC QN AUTO: 31.9 PG (ref 26.5–33)
MCHC RBC AUTO-ENTMCNC: 32.8 G/DL (ref 31.5–36.5)
MCV RBC AUTO: 97 FL (ref 78–100)
MONOCYTES # BLD AUTO: 0.5 10E9/L (ref 0–1.3)
MONOCYTES NFR BLD AUTO: 12.5 %
NEUTROPHILS # BLD AUTO: 2.5 10E9/L (ref 1.6–8.3)
NEUTROPHILS NFR BLD AUTO: 61.4 %
PLATELET # BLD AUTO: 246 10E9/L (ref 150–450)
POTASSIUM SERPL-SCNC: 4.3 MMOL/L (ref 3.4–5.3)
RBC # BLD AUTO: 4.11 10E12/L (ref 3.8–5.2)
SODIUM SERPL-SCNC: 140 MMOL/L (ref 133–144)
WBC # BLD AUTO: 4.1 10E9/L (ref 4–11)

## 2019-03-04 PROCEDURE — 36415 COLL VENOUS BLD VENIPUNCTURE: CPT | Performed by: FAMILY MEDICINE

## 2019-03-04 PROCEDURE — 80048 BASIC METABOLIC PNL TOTAL CA: CPT | Performed by: FAMILY MEDICINE

## 2019-03-04 PROCEDURE — 99214 OFFICE O/P EST MOD 30 MIN: CPT | Performed by: FAMILY MEDICINE

## 2019-03-04 PROCEDURE — 81025 URINE PREGNANCY TEST: CPT | Performed by: FAMILY MEDICINE

## 2019-03-04 PROCEDURE — 85025 COMPLETE CBC W/AUTO DIFF WBC: CPT | Performed by: FAMILY MEDICINE

## 2019-03-04 ASSESSMENT — MIFFLIN-ST. JEOR: SCORE: 1321.34

## 2019-03-04 ASSESSMENT — PAIN SCALES - GENERAL: PAINLEVEL: NO PAIN (0)

## 2019-03-04 NOTE — PROGRESS NOTES
[facility]  [address] [place] [id number]  [phone]  Dept: [phone]    PRE-OP EVALUATION:  Today's date: 3/4/2019    Ethel Murillo (: 1980) presents for pre-operative evaluation assessment as requested by Dr. Amanda Denney.  She requires evaluation and anesthesia risk assessment prior to undergoing surgery/procedure for treatment of Myomectomy .    Primary Physician: Nico Cottrell  Type of Anesthesia Anticipated: to be determined    Patient has a Health Care Directive or Living Will: Yes-      Preop Questions 3/4/2019   Who is doing your surgery? amanda denney   What are you having done? myomectomy   Date of Surgery/Procedure: 2019   Facility or Hospital where procedure/surgery will be performed: [facility]   1.  Do you have a history of Heart attack, stroke, stent, coronary bypass surgery, or other heart surgery? No   2.  Do you ever have any pain or discomfort in your chest? No   3.  Do you have a history of  Heart Failure? No   4.   Are you troubled by shortness of breath when:  walking on a level surface, or up a slight hill, or at night? No   5.  Do you currently have a cold, bronchitis or other respiratory infection? No   6.  Do you have a cough, shortness of breath, or wheezing? No   7.  Do you sometimes get pains in the calves of your legs when you walk? No   8. Do you or anyone in your family have previous history of blood clots? YES - mother (DVT then PE)   9.  Do you or does anyone in your family have a serious bleeding problem such as prolonged bleeding following surgeries or cuts? YES - mother    10. Have you ever had problems with anemia or been told to take iron pills? YES - anemia    11. Have you had any abnormal blood loss such as black, tarry or bloody stools, or abnormal vaginal bleeding? YES - abnormal vaginal bleeding    12. Have you ever had a blood transfusion? YES - 2018    13. Have you or any of your relatives  ever had problems with anesthesia? No   14. Do you have sleep apnea, excessive snoring or daytime drowsiness? No   15. Do you have any prosthetic heart valves? No   16. Do you have prosthetic joints? No   17. Is there any chance that you may be pregnant? No       HPI:     HPI related to upcoming procedure: DVT    LMP: 2/8/2019  See problem list for active medical problems.  Problems all longstanding and stable, except as noted/documented.  See ROS for pertinent symptoms related to these conditions.                                             TDAP: 6 yrs when immigrated to the [place]                                                                                                               .    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 Plan colp-  11/21/12 colposcopy. bx-- ROSAURA 1/2. Plan-- cryotherapy  12/12/12 cryotherapy performed. Repeat pap 3 months. Reminder in epic.   3/29/13: Pap - NIL. Repeat pap 3 months. Reminder in epic.   6/25/13: Pap - NIL. Repeat pap 6 months. Reminder in epic.   11/26/13: Pap - NIL, + HR HPV 31. Repeat pap and HPV in 1 year. Reminder in epic.   1/7/15: Pap - NIL, + HR HPV. Plan colp  2/12/15: [place] Bx & ECC - Negative. Plan cotest in 1 year.  1/19/16: Pap - NIL, Neg HPV. Plan cotest in 1 year.   1/23/17 NIL pap/Neg HPV: plan: routine screening    *manage conservatively - infertility       Fibroid uterus 11/09/2012     Priority: Medium     Dysmenorrhea 11/09/2012     Priority: Medium      Past Medical History:   Diagnosis Date     Cervical high risk HPV  "(human papillomavirus) test positive 11/2013, 1/2015    type 31, HR HPV     Eczema     of vulva     H/O colposcopy with cervical biopsy 2/2015    Bx & ECC - Negative     Infertility      LSIL (low grade squamous intraepithelial lesion) on Pap smear 11/2012    colp 11/2012 - ROSAURA 1/2     Uterine fibroid 11/2012     Past Surgical History:   Procedure Laterality Date     CRYOTHERAPY, CERVICAL  12/2012     LASER CO2 VULVA  12/2011    small condyloma       MYOMECTOMY UTERUS  8/3/2011    2.5 cm/ 4.5 cm/ 6.5 and 7.5 cm  surgery performed in [place].       Current Outpatient Medications   Medication Sig Dispense Refill     cholecalciferol (VITAMIN D3) 27893 UNITS capsule Take 1 capsule (50,000 Units) by mouth once a week 8 capsule 0     ferrous sulfate (IRON SUPPLEMENT) 325 (65 Fe) MG tablet Take 1 tablet (325 mg) by mouth 2 times daily 180 tablet 0     triamcinolone (KENALOG) 0.1 % external cream Apply topically 2 times daily 45 g 1     vitamin D3 (CHOLECALCIFEROL) 2000 units tablet Take 1 tablet by mouth daily       OTC products: None, except as noted above    Allergies   Allergen Reactions     Guaifenesin Nausea      Latex Allergy: NO    Social History     Tobacco Use     Smoking status: Never Smoker     Smokeless tobacco: Never Used   Substance Use Topics     Alcohol use: No     History   Drug Use No       REVIEW OF SYSTEMS:   Constitutional, HEENT, cardiovascular, pulmonary, gi and gu systems are negative, except as otherwise noted.    EXAM:   /68   Pulse 87   Temp 99.3  F (37.4  C) (Oral)   Resp 12   Ht 1.6 m (5' 3\")   Wt 68.2 kg (150 lb 6.4 oz)   LMP 02/08/2019 (Exact Date)   SpO2 98%   BMI 26.64 kg/m      GENERAL APPEARANCE: healthy, alert and no distress     EYES: EOMI, PERRL     HENT: ear canals and TM's normal and nose and mouth without ulcers or lesions     NECK: no adenopathy and thyroid normal to palpation     RESP: lungs clear to auscultation - no rales, rhonchi or wheezes     CV: regular rates " and rhythm, normal S1 S2, no S3 or S4 and no murmur, click or rub     ABDOMEN:  soft, nontender, no HSM or masses and bowel sounds normal     MS: extremities normal- no gross deformities noted, no evidence of inflammation in joints, FROM in all extremities.     SKIN: no suspicious lesions or rashes     NEURO: Normal strength and tone, mentation intact and speech normal     PSYCH: mentation appears normal. and affect normal/bright    DIAGNOSTICS:   EKG: Not indicated due to non-vascular surgery and low risk of event (age <65 and without cardiac risk factors)  Results for orders placed or performed in visit on 03/04/19   CBC with platelets differential   Result Value Ref Range    WBC 4.1 4.0 - 11.0 10e9/L    RBC Count 4.11 3.8 - 5.2 10e12/L    Hemoglobin 13.1 11.7 - 15.7 g/dL    Hematocrit 39.9 35.0 - 47.0 %    MCV 97 78 - 100 fl    MCH 31.9 26.5 - 33.0 pg    MCHC 32.8 31.5 - 36.5 g/dL    RDW 12.8 10.0 - 15.0 %    Platelet Count 246 150 - 450 10e9/L    Diff Method Automated Method     % Neutrophils 61.4 %    % Lymphocytes 24.4 %    % Monocytes 12.5 %    % Eosinophils 1.7 %    % Basophils 0.0 %    Absolute Neutrophil 2.5 1.6 - 8.3 10e9/L    Absolute Lymphocytes 1.0 0.8 - 5.3 10e9/L    Absolute Monocytes 0.5 0.0 - 1.3 10e9/L    Absolute Eosinophils 0.1 0.0 - 0.7 10e9/L    Absolute Basophils 0.0 0.0 - 0.2 10e9/L   Basic metabolic panel  (Ca, Cl, CO2, Creat, Gluc, K, Na, BUN)   Result Value Ref Range    Sodium 140 133 - 144 mmol/L    Potassium 4.3 3.4 - 5.3 mmol/L    Chloride 106 94 - 109 mmol/L    Carbon Dioxide 27 20 - 32 mmol/L    Anion Gap 7 3 - 14 mmol/L    Glucose 89 70 - 99 mg/dL    Urea Nitrogen 11 7 - 30 mg/dL    Creatinine 0.65 0.52 - 1.04 mg/dL    GFR Estimate >90 >60 mL/min/[1.73_m2]    GFR Estimate If Black >90 >60 mL/min/[1.73_m2]    Calcium 8.6 8.5 - 10.1 mg/dL   HCG Qual, Urine ([id number])   Result Value Ref Range    HCG Qual Urine Negative NEG^Negative     IMPRESSION:   Reason for surgery/procedure: Uterine  Fibroids/Myomectomy  Diagnosis/reason for consult: Uterine Fibroids/Pre OP    The proposed surgical procedure is considered INTERMEDIATE risk.    REVISED CARDIAC RISK INDEX  The patient has the following serious cardiovascular risks for perioperative complications such as (MI, PE, VFib and 3  AV Block):  No serious cardiac risks  INTERPRETATION: 1 risks: Class II (low risk - 0.9% complication rate)    The patient has the following additional risks for perioperative complications:  No identified additional risks      ICD-10-CM    1. Preop general physical exam Z01.818 HCG Qual, Urine ([id number])     CANCELED: Beta HCG qual IFA urine   2. Uterine leiomyoma, unspecified location D25.9 CBC with platelets differential     Basic metabolic panel  (Ca, Cl, CO2, Creat, Gluc, K, Na, BUN)     CANCELED: Beta HCG qual IFA urine   3. Iron deficiency anemia, unspecified iron deficiency anemia type D50.9    4. Family history of DVT Z82.49    5. Need for prophylactic vaccination with tetanus-diphtheria (Td) Z23        RECOMMENDATIONS:     --Patient is to take all scheduled medications on the day of surgery EXCEPT for modifications listed below.    None.    APPROVAL GIVEN to proceed with proposed procedure, without further diagnostic evaluation     Signed Electronically by: Israel Ramirez MD    Copy of this evaluation report is provided to requesting physician.    [place] Preop Guidelines    Revised Cardiac Risk Index

## 2019-03-09 ENCOUNTER — HEALTH MAINTENANCE LETTER (OUTPATIENT)
Age: 39
End: 2019-03-09

## 2019-03-14 NOTE — TELEPHONE ENCOUNTER
I spoke with Mio at [facility] - cpt code 05570 prior authorization -Clinicals faxed to [phone]  Pending case #[id number]

## 2019-03-15 NOTE — PROGRESS NOTES
Ethel Murillo is a 38 year old female,,  with known history of uterine leiomyoma.  She has attempted achieving pregnancy, but she has had difficulty.  In , she had a myomectomy in [place].  The uterine leiomyoma reoccurred and she had associated pelvic pain, although the pain was primarily adnexal.   She had tried herbal teas for the fibroids and the NSAIDs for the dysmenorrhea.   Her care also involved management of cervical dysplasia.  Over the past few years while the uterine size has increased, she vacillated between management options, in part, because of her desire for a pregnancy.  She had associated abnormal uterine bleeding and the endometrial biopsy in , showed disordered proliferative endometrium.    She was scheduled for the uterine myomectomy 1.75 year ago, but she decided she was going to try an herb with the Latin name of Arum matmaria isabeltum .  All parts of it are considered poisonous.  She states she took it once or twice and she thought it helped decrease the fibroids.    She has had multiple gynecological ultrasounds performed.     She had the following ultrasound performed and we reviewed the ultrasound report and the films.       US PELVIC COMPLETE WITH TRANSVAGINAL 2019 2:30 PM   HISTORY: Follow-up uterine fibroids. Chronic anemia.  COMPARISON: Pelvic ultrasound 2017.   FINDINGS: Transvaginal images were performed to better evaluate the patient's uterus, ovaries and endometrial stripe.  The uterus is enlarged and lobulated measuring 18.7 x 7.8 x 12.8 cm.  Multiple uterine fibroids are present. One in the left fundus measures 7.0 x 5.9 x 8.1 cm. Several are in the submucosal region of the uterine body measuring 4.7, 4.5 and 4.7 cm. Lower uterine segment or cervical fibroid measures 5.6 cm. At least six or more prominent fibroids are present. Endometrial stripe measures 9 mm and is normal for patient's age. The right ovary is not visualized. The left ovary measures 1.8 x  1.6 x 3.0 cm and contains a few small follicles. Color Doppler flow is noted within the left ovary. No adnexal masses are present. No free pelvic fluid is present.                                                                IMPRESSION: Multiple prominent uterine fibroids. No significant endometrial thickening or endometrial free fluid. Some of these fibroids measure slightly smaller in size and others minimally larger when compared to prior ultrasound. Exact measurement is difficult due to the extensive shadowing from these fibroids.    ULTRASOUND PELVIC COMPLETE WITH TRANSVAGINAL 6/22/2017 9:37 AM   HISTORY: Leiomyoma of uterus, unspecified. Abdominal distension (gaseous).   FINDINGS: Transvaginal images were performed to better evaluate the patient's uterus, ovaries and endometrial stripe.  The uterus is enlarged and lobulated measuring 17.2 x 8.4 x 14.2 cm. Numerous uterine fibroids are present. The largest is in the anterior left uterine body measuring 9.2 x 6.7 x 6.4 cm. Another is in the lower uterine segment possibly in the region of the cephalad portion of the cervix measuring 5.2 x 4.6 x 3.6 cm. Many of these fibroids appear to be submucosal in location and distort the endometrium.  Endometrial stripe measures 14 mm and is thickened for patient's age. The right ovary is not visualized. The left ovary is not visualized. No adnexal masses are present. No free pelvic fluid is present.                                                            IMPRESSION: Multiple uterine fibroids distorting the endometrial stripe which is mildly thickened. This endometrial thickening may be related to the multiple submucosal fibroids.        PELVIC ULTRASOUND WITH ENDOVAGINAL TRANSDUCER IMAGING   1/25/2016 9:50 AM   HISTORY: Leiomyoma of uterus, unspecified.  TECHNIQUE:  Endovaginal sonography was added to the transabdominal exam to better evaluate the uterus and ovaries.  COMPARISON: Pelvic ultrasound 6/6/2014.  FINDINGS:  Endometrium is 9 mm thick. Uterus measures 12.4 x 8.5 x 10.8 cm. Multiple uterine fibers again identified. These are difficult to correlate with the prior exam. Anterior right uterine fibroid is 4.5 x 4.8 x 3.9 cm. Anterior right uterine fibroid is 3.1 x 3.2 x 3.2 cm. Posterior left uterine fibroid is 4.8 x 4.9 x 4.9 cm. This fibroid is the largest of the measured fibroids, previously the largest fibroid in this region measured up to 4.5 cm. Anterior left uterine fibroid is 3.3 x 3.6 x 3.3 cm. The left ovary appears normal. The right ovary contains a 1.4 cm cystic lesion. There is trace pelvic fluid.  IMPRESSION:  1. Multiple uterine fibroids again identified. A posterior left uterine fibroid may be slightly larger compared to the prior study, and the other fibroids are not substantially changed.  2. 1.4 cm small cystic lesion in the right ovary.       Past Medical History:   Diagnosis Date     Cervical high risk HPV (human papillomavirus) test positive 11/2013, 1/2015    type 31, HR HPV     Eczema     of vulva     H/O colposcopy with cervical biopsy 2/2015    Bx & ECC - Negative     Infertility      LSIL (low grade squamous intraepithelial lesion) on Pap smear 11/2012    colp 11/2012 - ROSAURA 1/2     Uterine fibroid 11/2012     Past Surgical History:   Procedure Laterality Date     CRYOTHERAPY, CERVICAL  12/2012     LASER CO2 VULVA  12/2011    small condyloma       MYOMECTOMY UTERUS  8/3/2011    2.5 cm/ 4.5 cm/ 6.5 and 7.5 cm  surgery performed in [place].          Allergies   Allergen Reactions     Guaifenesin Nausea       Current Outpatient Medications   Medication Sig Dispense Refill     cholecalciferol (VITAMIN D3) 22415 UNITS capsule Take 1 capsule (50,000 Units) by mouth once a week 8 capsule 0     ferrous sulfate (IRON SUPPLEMENT) 325 (65 Fe) MG tablet Take 1 tablet (325 mg) by mouth 2 times daily 180 tablet 0     triamcinolone (KENALOG) 0.1 % external cream Apply topically 2 times daily 45 g 1     vitamin D3  (CHOLECALCIFEROL) 2000 units tablet Take 1 tablet by mouth daily         Social History     Socioeconomic History     Marital status:      Spouse name: Not on file     Number of children: 0     Years of education: Not on file     Highest education level: Not on file   Occupational History     Employer: UNEMPLOYED   Social Needs     Financial resource strain: Not on file     Food insecurity:     Worry: Not on file     Inability: Not on file     Transportation needs:     Medical: Not on file     Non-medical: Not on file   Tobacco Use     Smoking status: Never Smoker     Smokeless tobacco: Never Used   Substance and Sexual Activity     Alcohol use: No     Drug use: No     Sexual activity: Yes     Partners: Male   Lifestyle     Physical activity:     Days per week: Not on file     Minutes per session: Not on file     Stress: Not on file   Relationships     Social connections:     Talks on phone: Not on file     Gets together: Not on file     Attends [employer] service: Not on file     Active member of club or organization: Not on file     Attends meetings of clubs or organizations: Not on file     Relationship status: Not on file     Intimate partner violence:     Fear of current or ex partner: Not on file     Emotionally abused: Not on file     Physically abused: Not on file     Forced sexual activity: Not on file   Other Topics Concern     Parent/sibling w/ CABG, MI or angioplasty before 65F 55M? No   Social History Narrative     Not on file       Family History   Problem Relation Age of Onset     Hypertension Mother      Asthma Mother      Ulcerative Colitis Mother      Hypertension Father        Review of Systems:  10 point ROS of systems including Constitutional, Eyes, Respiratory, Cardiovascular, Gastroenterology, Genitourinary, Integumentary, Muscularskeletal, Psychiatric were all negative except for pertinent positives noted in my HPI and in the PMH.      Exam  /71 (BP Location: Right arm, Cuff Size:  Adult Regular)   Pulse 79   Wt 67.5 kg (148 lb 12.8 oz)   LMP 02/08/2019 (Exact Date)   SpO2 98%   BMI 26.36 kg/m    General:  WNWD female, NAD  Alert  Oriented x 3  Gait:  Normal  Skin:  Normal skin turgor  HEENT:  NC/AT, EOMI  Abdomen:  Non-tender, non-distended.  Pelvic exam:  Not performed  Extremities:  No clubbing, no cyanosis and no edema.      Assessment  Uterine leiomyoma  Pelvic pain  Dysmenorrhea      Plan  The patient, her  and myself (along with the ) discussed the options which include continued observation, myomectomy and hysterectomy.  The fibroid embolization is also discussed and is an option, but pregnancy would be contraindicated with this procedure.  In addition, the volume of the fibroid would decrease, but the fibroid will still be there.  The goals and limitations of the options are discussed and questions seem to be answered.   The myomectomy will be scheduled.   She is to return for further discussion of the procedure and questions she might have.     TT 35 min  CT and review of records, greater than 50%, as noted above in the HPI and in the Plan.     Nico Cottrlel

## 2019-03-16 NOTE — PROGRESS NOTES
Ethel Murillo is a 38 year old female, .  She presents today with known history of uterine leiomyoma, to discuss surgical options again.  She and her  would like to have the myomectomy and if possible, conserve the uterus.  She has attempted achieving pregnancy, but she has had difficulty.  [facility] , [facility]  The uterine leiomyoma reoccurred and she had associated pelvic pain, although the pain was primarily adnexal.   She had tried herbal teas for the fibroids and the NSAIDs for the dysmenorrhea.   Her care also involved management of cervical dysplasia.  Over the past few years while the uterine size has increased, she vacillated between management options, in part, because of her desire for a pregnancy.  She had associated abnormal uterine bleeding and the endometrial biopsy in , showed disordered proliferative endometrium.    She was scheduled for the uterine myomectomy 1.75 year ago, but she decided she was going to try an herb with the Latin name of Arum matulatum .  All parts of it are considered poisonous.  She states she took it once or twice and she thought it helped decrease the fibroids.    She has had multiple gynecological ultrasounds performed.      She had the following ultrasound performed and we reviewed the ultrasound report and the films.       US PELVIC COMPLETE WITH TRANSVAGINAL 2019 2:30 PM   HISTORY: Follow-up uterine fibroids. Chronic anemia.  COMPARISON: Pelvic ultrasound 2017.   FINDINGS: Transvaginal images were performed to better evaluate the patient's uterus, ovaries and endometrial stripe.  The uterus is enlarged and lobulated measuring 18.7 x 7.8 x 12.8 cm.  Multiple uterine fibroids are present. One in the left fundus measures 7.0 x 5.9 x 8.1 cm. Several are in the submucosal region of the uterine body measuring 4.7, 4.5 and 4.7 cm. Lower uterine segment or cervical fibroid measures 5.6 cm. At least six or more prominent  fibroids are present. Endometrial stripe measures 9 mm and is normal for patient's age. The right ovary is not visualized. The left ovary measures 1.8 x 1.6 x 3.0 cm and contains a few small follicles. Color Doppler flow is noted within the left ovary. No adnexal masses are present. No free pelvic fluid is present.                                                                IMPRESSION: Multiple prominent uterine fibroids. No significant endometrial thickening or endometrial free fluid. Some of these fibroids measure slightly smaller in size and others minimally larger when compared to prior ultrasound. Exact measurement is difficult due to the extensive shadowing from these fibroids.     ULTRASOUND PELVIC COMPLETE WITH TRANSVAGINAL 6/22/2017 9:37 AM   HISTORY: Leiomyoma of uterus, unspecified. Abdominal distension (gaseous).   FINDINGS: Transvaginal images were performed to better evaluate the patient's uterus, ovaries and endometrial stripe.  The uterus is enlarged and lobulated measuring 17.2 x 8.4 x 14.2 cm. Numerous uterine fibroids are present. The largest is in the anterior left uterine body measuring 9.2 x 6.7 x 6.4 cm. Another is in the lower uterine segment possibly in the region of the cephalad portion of the cervix measuring 5.2 x 4.6 x 3.6 cm. Many of these fibroids appear to be submucosal in location and distort the endometrium.  Endometrial stripe measures 14 mm and is thickened for patient's age. The right ovary is not visualized. The left ovary is not visualized. No adnexal masses are present. No free pelvic fluid is present.                                                            IMPRESSION: Multiple uterine fibroids distorting the endometrial stripe which is mildly thickened. This endometrial thickening may be related to the multiple submucosal fibroids.        PELVIC ULTRASOUND WITH ENDOVAGINAL TRANSDUCER IMAGING   1/25/2016 9:50 AM   HISTORY: Leiomyoma of uterus, unspecified.  TECHNIQUE:   Endovaginal sonography was added to the transabdominal exam to better evaluate the uterus and ovaries.  COMPARISON: Pelvic ultrasound 6/6/2014.  FINDINGS: Endometrium is 9 mm thick. Uterus measures 12.4 x 8.5 x 10.8 cm. Multiple uterine fibers again identified. These are difficult to correlate with the prior exam. Anterior right uterine fibroid is 4.5 x 4.8 x 3.9 cm. Anterior right uterine fibroid is 3.1 x 3.2 x 3.2 cm. Posterior left uterine fibroid is 4.8 x 4.9 x 4.9 cm. This fibroid is the largest of the measured fibroids, previously the largest fibroid in this region measured up to 4.5 cm. Anterior left uterine fibroid is 3.3 x 3.6 x 3.3 cm. The left ovary appears normal. The right ovary contains a 1.4 cm cystic lesion. There is trace pelvic fluid.  IMPRESSION:  1. Multiple uterine fibroids again identified. A posterior left uterine fibroid may be slightly larger compared to the prior study, and the other fibroids are not substantially changed.  2. 1.4 cm small cystic lesion in the right ovary.     The patient's medical history, social history and family history are reviewed.  No updates at this time.     Review of Systems:  10 point ROS of systems including Constitutional, Eyes, Respiratory, Cardiovascular, Gastroenterology, Genitourinary, Integumentary, Muscularskeletal, Psychiatric were all negative except for pertinent positives noted in my HPI and in the PMH.      Exam  /71 (BP Location: Right arm, Cuff Size: Adult Regular)   Pulse 76   Wt 67.3 kg (148 lb 6.4 oz)   LMP 02/08/2019 (Exact Date)   SpO2 99%   BMI 26.29 kg/m    General:  WNWD female, NAD  Alert  Oriented x 3  Gait:  Normal  Skin:  Normal skin turgor  HEENT:  NC/AT, EOMI  Abdomen:  Non-tender, non-distended.  Pelvic exam:  Not performed  Extremities:  No clubbing, no cyanosis and no edema.      Assessment  Uterine leiomyoma  Pelvic pain  Dysmenorrhea      Plan  We discussed the options again.  She wants to proceed with the myomectomy.   She is aware the uterus might not be able to be reassembled and the hysterectomy might still occur.  She voices understanding.  We discussed the procedure, the associated risks and benefits and goals and limitations.  The risks include, but are not limited to, death, brain damage, paralysis of any or all limbs, loss of an organ, function of an organ, disfiguring scars, bleeding, infection, damage to the uterus, tubes, ovaries, bowel, bladder, cervix and vagina.  In addition, there is a possibility of other procedures that might be deemed necessary at the time of the surgery, depending upon findings and/or complications.  This may also include laparoscopy, laparotomy, and rarely colostomy (which often times can be reversible in the future).  Risks with blood include but are not limited to HIV/AIDS, hepatitis and transfusion reactions, with transfusion reactions being the greatest risk.  The postop pain management, the hospital stay and the post op recovery discussed.  Questions seem to be answered.     TT 25 min  CT greater than 50%    Nico Cottrell MD

## 2019-03-18 ENCOUNTER — TRANSFERRED RECORDS (OUTPATIENT)
Dept: HEALTH INFORMATION MANAGEMENT | Facility: CLINIC | Age: 39
End: 2019-03-18

## 2019-03-22 ENCOUNTER — TRANSFERRED RECORDS (OUTPATIENT)
Dept: HEALTH INFORMATION MANAGEMENT | Facility: CLINIC | Age: 39
End: 2019-03-22

## 2019-03-26 ENCOUNTER — TELEPHONE (OUTPATIENT)
Dept: OBGYN | Facility: CLINIC | Age: 39
End: 2019-03-26

## 2019-03-26 NOTE — TELEPHONE ENCOUNTER
Will send message in a [employer] to patient. Will copy Dr. Cottrell's notes    Eulalia Tucker  Women's Health

## 2019-03-26 NOTE — TELEPHONE ENCOUNTER
Reason for Call: Request for an order or referral:    Order or referral being requested: Order for lab work     Date needed: as soon as possible    Has the patient been seen by the PCP for this problem? NO    Additional comments: patient states she was advised to have her hgb and liver checked by Dr. Cottrell after her surgery as her hgb was low. Need order in chart. Please call patient     Phone number Patient can be reached at:  Home number on file [phone] (home)    Best Time:  ASAP    Can we leave a detailed message on this number?  YES    Call taken on 3/26/2019 at 10:29 AM by Rachana Lucero

## 2019-04-16 ENCOUNTER — OFFICE VISIT (OUTPATIENT)
Dept: OBGYN | Facility: CLINIC | Age: 39
End: 2019-04-16
Payer: COMMERCIAL

## 2019-04-16 VITALS
OXYGEN SATURATION: 100 % | BODY MASS INDEX: 26.04 KG/M2 | DIASTOLIC BLOOD PRESSURE: 64 MMHG | HEART RATE: 82 BPM | WEIGHT: 147 LBS | SYSTOLIC BLOOD PRESSURE: 96 MMHG

## 2019-04-16 DIAGNOSIS — D62 ANEMIA DUE TO BLOOD LOSS, ACUTE: Primary | ICD-10-CM

## 2019-04-16 DIAGNOSIS — Z98.890 POSTOPERATIVE STATE: ICD-10-CM

## 2019-04-16 DIAGNOSIS — D50.0 IRON DEFICIENCY ANEMIA DUE TO CHRONIC BLOOD LOSS: ICD-10-CM

## 2019-04-16 LAB
BILIRUB DIRECT SERPL-MCNC: 0.2 MG/DL (ref 0–0.2)
BILIRUB SERPL-MCNC: 1.1 MG/DL (ref 0.2–1.3)
HGB BLD-MCNC: 12.3 G/DL (ref 11.7–15.7)

## 2019-04-16 PROCEDURE — 36415 COLL VENOUS BLD VENIPUNCTURE: CPT | Performed by: OBSTETRICS & GYNECOLOGY

## 2019-04-16 PROCEDURE — 85018 HEMOGLOBIN: CPT | Performed by: OBSTETRICS & GYNECOLOGY

## 2019-04-16 PROCEDURE — 82248 BILIRUBIN DIRECT: CPT | Performed by: OBSTETRICS & GYNECOLOGY

## 2019-04-16 PROCEDURE — 99024 POSTOP FOLLOW-UP VISIT: CPT | Performed by: OBSTETRICS & GYNECOLOGY

## 2019-04-16 PROCEDURE — 82247 BILIRUBIN TOTAL: CPT | Performed by: OBSTETRICS & GYNECOLOGY

## 2019-04-16 NOTE — PROGRESS NOTES
Ethel is a 38 year old , She is 4 weeks s/p abdominal myomectomy.  Her postop recovery has been complicated by post op anemia.  She is currently requiring ibuprofen and tylenol for pain management.  no vaginal bleeding, no hot flashes. Energy level is not yet normal.  Denies fever, chills.    The pathology report showed:   A. Uterine myomas, myomectomy - Multiple benign leiomyomas      Clinical Information Uterine fibroid   Gross Description Uterine myomas: Is a 400 g, 19.5 x 17.0 x 5.0 cm aggregate of multiple pink-tan whorled nodules, individually ranging in size from 0.4-6.0 cm. The nodules are serially sectioned to reveal a grossly tan-yellow, necrotic cut surface involving over 90% of the specimen. Representative sections are submitted in cassettes A1 through A 20.         The medical history, social history and family history have been reviewed and updated as indicated.      ROS:  4 point ROS including Respiratory, CV, GI and , other than that noted in the HPI and the PMH, is negative       PE: BP 96/64 (BP Location: Right arm, Cuff Size: Adult Regular)   Pulse 82   Wt 66.7 kg (147 lb)   LMP 2019   SpO2 100%   BMI 26.04 kg/m    General:  WNWD female, NAD  Alert  Oriented x 3  Gait:  Normal  Skin:  Normal skin turgor  HEENT:  NC/AT, EOMI  Abdomen:  Non-distended.  She has some skin tenderness.  Incision is healing well.    Extremities:  No clubbing, no cyanosis and no edema.        ASSESSMENT:  Post op myomectomy    PLAN:  She has not had her menses yet.  I suggest to give it some additional time.  Should she not have menses in the next couple of months, then I want her to return to workup amenorrhea.  We discussed the anemia and blood loss might have caused some neural issues.    She voices understanding.    Questions seem to be answered.   Labs ordered.     Nico Cottrell MD

## 2019-07-26 ENCOUNTER — TELEPHONE (OUTPATIENT)
Dept: FAMILY MEDICINE | Facility: CLINIC | Age: 39
End: 2019-07-26

## 2019-07-26 ENCOUNTER — OFFICE VISIT (OUTPATIENT)
Dept: FAMILY MEDICINE | Facility: CLINIC | Age: 39
End: 2019-07-26
Payer: COMMERCIAL

## 2019-07-26 DIAGNOSIS — H61.23 BILATERAL IMPACTED CERUMEN: Primary | ICD-10-CM

## 2019-07-26 DIAGNOSIS — R21 RASH: ICD-10-CM

## 2019-07-26 DIAGNOSIS — E56.9 VITAMIN DEFICIENCY: ICD-10-CM

## 2019-07-26 DIAGNOSIS — Z86.2 HISTORY OF ANEMIA: ICD-10-CM

## 2019-07-26 DIAGNOSIS — L70.9 ACNE, UNSPECIFIED ACNE TYPE: ICD-10-CM

## 2019-07-26 DIAGNOSIS — L30.9 ECZEMA, UNSPECIFIED TYPE: ICD-10-CM

## 2019-07-26 LAB
DEPRECATED CALCIDIOL+CALCIFEROL SERPL-MC: 26 UG/L (ref 20–75)
IRON SATN MFR SERPL: 26 % (ref 15–46)
IRON SERPL-MCNC: 88 UG/DL (ref 35–180)
TIBC SERPL-MCNC: 336 UG/DL (ref 240–430)
VIT B12 SERPL-MCNC: 2489 PG/ML (ref 193–986)

## 2019-07-26 PROCEDURE — 83540 ASSAY OF IRON: CPT | Performed by: FAMILY MEDICINE

## 2019-07-26 PROCEDURE — 82607 VITAMIN B-12: CPT | Performed by: FAMILY MEDICINE

## 2019-07-26 PROCEDURE — 36415 COLL VENOUS BLD VENIPUNCTURE: CPT | Performed by: FAMILY MEDICINE

## 2019-07-26 PROCEDURE — 83550 IRON BINDING TEST: CPT | Performed by: FAMILY MEDICINE

## 2019-07-26 PROCEDURE — 82306 VITAMIN D 25 HYDROXY: CPT | Performed by: FAMILY MEDICINE

## 2019-07-26 PROCEDURE — 99214 OFFICE O/P EST MOD 30 MIN: CPT | Performed by: FAMILY MEDICINE

## 2019-07-26 RX ORDER — TRIAMCINOLONE ACETONIDE 1 MG/G
CREAM TOPICAL 2 TIMES DAILY
Qty: 45 G | Refills: 1 | Status: SHIPPED | OUTPATIENT
Start: 2019-07-26 | End: 2019-07-29

## 2019-07-26 NOTE — TELEPHONE ENCOUNTER
Pharmacy calling back because they also need calcification for triamcinolone (KENALOG) 0.1 % external cream, where it is applied and how long it should last the patient, please call back and advise.

## 2019-07-26 NOTE — TELEPHONE ENCOUNTER
Reason for call:  Other   Patient called regarding (reason for call): call back  Additional comments: Pharmacy calling to get clarification on where the medication is being applied and how long it should last, diphenhydrAMINE (BENADRYL) 2 % external cream.    Phone number to reach patient:  Other phone number:  [phone]    Best Time:  any    Can we leave a detailed message on this number?  YES

## 2019-07-26 NOTE — TELEPHONE ENCOUNTER
(Kenalog) Apply to areas with eczema twice daily for no more than 14 days consecutively   (Benadryl) for itching as needed for 1 month

## 2019-07-26 NOTE — PROGRESS NOTES
Subjective     Ethel Murillo is a 38 year old female who presents to clinic today for the following health issues:    HPI   Chief Complaint   Patient presents with     Ear Problem     Left is worse than right, feels plugged x 1 week     Derm Problem     acne on jaw/chest line and excema on breasts     Ears feel plugged:    Wax both ear    Acnes:   On the right jaw and upper chest   Saw dermatologist and was given Spironolatctone and not used due to concern about side effects and not wanting to treat symptoms but disease.   Using some oil     Eczema:   Also has eczema: and triamcinolones helps    Vitamin Deficiency:   Concerned about vit D deficiency    Patient Active Problem List   Diagnosis     Moderate dysplasia of cervix (ROSAURA II)     Fibroid uterus     Dysmenorrhea     CARDIOVASCULAR SCREENING; LDL GOAL LESS THAN 160     Female infertility of other specified origin     Iron deficiency anemia due to chronic blood loss     Past Surgical History:   Procedure Laterality Date     CRYOTHERAPY, CERVICAL  12/2012     LASER CO2 VULVA  12/2011    small condyloma       MYOMECTOMY UTERUS  8/3/2011    2.5 cm/ 4.5 cm/ 6.5 and 7.5 cm  surgery performed in [place].       MYOMECTOMY UTERUS  03/18/2019    30 uterine leiomyoma removed.       Social History     Tobacco Use     Smoking status: Never Smoker     Smokeless tobacco: Never Used   Substance Use Topics     Alcohol use: No     Family History   Problem Relation Age of Onset     Hypertension Mother      Asthma Mother      Ulcerative Colitis Mother      Hypertension Father            PROBLEMS TO ADD ON...  Reviewed and updated as needed this visit by Provider         Review of Systems   Constitutional, HEENT, cardiovascular, pulmonary, gi and gu systems are negative, except as otherwise noted.      Objective    /60   Pulse 80   Wt 66.7 kg (147 lb)   BMI 26.04 kg/m    Body mass index is 26.04 kg/m .  Physical Exam   GENERAL: healthy, alert and no distress  SKIN:  "papular rash on Rt sub mandibular and upper chest.  EYES: Eyes grossly normal to inspection, PERRL and conjunctivae and sclerae normal  NECK: no adenopathy and thyroid normal to palpation  RESP: lungs clear to auscultation - no rales, rhonchi or wheezes  CV: regular rate and rhythm, normal S1 S2, no S3 or S4, no murmur, click or rub  MS: no gross musculoskeletal defects noted, no edema    Diagnostic Test Results:  Labs reviewed in Epic        Assessment & Plan     Ethel was seen today for ear problem and derm problem.    Diagnoses and all orders for this visit:    Bilateral impacted cerumen  -     REMOVE IMPACTED CERUMEN    Acne, unspecified acne type        -   Given Spironolactone by dermatology but will not take due to side effects. Discussed at length fact that almost all medications have side effects and we weight benefit against risk.     Eczema, unspecified type       -   Moisturizers, Kenalog cream.    Vitamin deficiency  -     Vitamin D Deficiency  -     Vitamin B12    Rash  -     triamcinolone (KENALOG) 0.1 % external cream; Apply topically 2 times daily  -     diphenhydrAMINE (BENADRYL) 2 % external cream; Apply topically 2 times daily as needed for itching    History of anemia  -     Iron and iron binding capacity    BMI:   Estimated body mass index is 26.04 kg/m  as calculated from the following:    Height as of 3/4/19: 1.6 m (5' 3\").    Weight as of this encounter: 66.7 kg (147 lb).   Weight management plan: Discussed healthy diet and exercise guidelines      Israel Ramirez MD  [facility]        "

## 2019-07-27 ENCOUNTER — MYC MEDICAL ADVICE (OUTPATIENT)
Dept: FAMILY MEDICINE | Facility: CLINIC | Age: 39
End: 2019-07-27

## 2019-07-27 VITALS
DIASTOLIC BLOOD PRESSURE: 60 MMHG | HEART RATE: 80 BPM | WEIGHT: 147 LBS | SYSTOLIC BLOOD PRESSURE: 102 MMHG | BODY MASS INDEX: 26.04 KG/M2

## 2019-07-29 RX ORDER — TRIAMCINOLONE ACETONIDE 1 MG/G
CREAM TOPICAL
Qty: 45 G | Refills: 1 | Status: SHIPPED | OUTPATIENT
Start: 2019-07-29 | End: 2020-01-31

## 2019-07-29 NOTE — TELEPHONE ENCOUNTER
Called pharmacy and pharmacy informs medication has not been clarified. No further concerns at this time. Estela Madrid MA

## 2019-07-29 NOTE — TELEPHONE ENCOUNTER
See other [employer] message.    Katalina Chung RN  [facility]

## 2019-07-29 NOTE — TELEPHONE ENCOUNTER
Clarified prescriptions sent for triamcinolone and benadryl creams    Pharmacy not open yet.  Please contact pharmacy to see if the clarified prescriptions sent are good now.  If not, what else is needed?  If they need specific body parts the creams are being applied to, then please call patient to ask    Carmelo Aaron RN

## 2019-12-17 ENCOUNTER — OFFICE VISIT (OUTPATIENT)
Dept: OBGYN | Facility: CLINIC | Age: 39
End: 2019-12-17
Payer: COMMERCIAL

## 2019-12-17 VITALS
BODY MASS INDEX: 26.68 KG/M2 | SYSTOLIC BLOOD PRESSURE: 109 MMHG | DIASTOLIC BLOOD PRESSURE: 74 MMHG | HEIGHT: 63 IN | HEART RATE: 77 BPM | WEIGHT: 150.6 LBS | OXYGEN SATURATION: 99 %

## 2019-12-17 DIAGNOSIS — Z87.410 HISTORY OF CERVICAL DYSPLASIA: ICD-10-CM

## 2019-12-17 DIAGNOSIS — Z01.419 ENCOUNTER FOR GYNECOLOGICAL EXAMINATION WITHOUT ABNORMAL FINDING: Primary | ICD-10-CM

## 2019-12-17 DIAGNOSIS — N87.1 MODERATE DYSPLASIA OF CERVIX (CIN II): ICD-10-CM

## 2019-12-17 PROCEDURE — 99396 PREV VISIT EST AGE 40-64: CPT | Performed by: OBSTETRICS & GYNECOLOGY

## 2019-12-17 PROCEDURE — 87624 HPV HI-RISK TYP POOLED RSLT: CPT | Performed by: OBSTETRICS & GYNECOLOGY

## 2019-12-17 PROCEDURE — G0145 SCR C/V CYTO,THINLAYER,RESCR: HCPCS | Performed by: OBSTETRICS & GYNECOLOGY

## 2019-12-17 ASSESSMENT — MIFFLIN-ST. JEOR: SCORE: 1309.31

## 2019-12-17 NOTE — PROGRESS NOTES
Ethel Murillo is a 39 year old female , who presents for annual exam.   No unusual bleeding, no incontinence, or unusual discharge.  She has bleeding that is monthly and last a couple of days, with spotting only.  She has not used more than a pad a day.      Last cholesterol:   Recent Labs   Lab Test 19  0917 17  1011 13  0757   CHOL 217* 181 199   HDL 59 49* 41*   * 119* 141*   TRIG 107 64 87   CHOLHDLRATIO  --   --  4.9     Past Medical History:   Diagnosis Date     Cervical high risk HPV (human papillomavirus) test positive 2013, 2015    type 31, HR HPV     Eczema     of vulva     H/O colposcopy with cervical biopsy 2015    Bx & ECC - Negative     Infertility      LSIL (low grade squamous intraepithelial lesion) on Pap smear 2012    colp 2012 - ROSAURA 1/2     Uterine fibroid 2012       Past Surgical History:   Procedure Laterality Date     CRYOTHERAPY, CERVICAL  2012     LASER CO2 VULVA  2011    small condyloma       MYOMECTOMY UTERUS  8/3/2011    2.5 cm/ 4.5 cm/ 6.5 and 7.5 cm  surgery performed in [place].       MYOMECTOMY UTERUS  2019    30 uterine leiomyoma removed.       OB History    Para Term  AB Living   0 0 0 0 0 0   SAB TAB Ectopic Multiple Live Births   0 0 0 0 0       Gyn History:  Gynecological History         Patient's last menstrual period was 2019 (exact date).     STD HPV/no PID/no IUD      history of abnormal pap smear:  no  Last pap:   Lab Results   Component Value Date    PAP NIL 2017           Current Outpatient Medications   Medication Sig Dispense Refill     diphenhydrAMINE (BENADRYL) 2 % external cream Apply topically 2 times daily as needed for itching (for 1 month) 30 g 0     vitamin D3 (CHOLECALCIFEROL) 2000 units tablet Take 1 tablet by mouth daily       cholecalciferol (VITAMIN D3) 73727 UNITS capsule Take 1 capsule (50,000 Units) by mouth once a week (Patient not taking: Reported on 2019) 8  capsule 0     ferrous sulfate (IRON SUPPLEMENT) 325 (65 Fe) MG tablet Take 1 tablet (325 mg) by mouth 2 times daily (Patient not taking: Reported on 12/17/2019) 180 tablet 0     triamcinolone (KENALOG) 0.1 % external cream Apply to areas with eczema twice daily for no more than 14 days consecutively (Patient not taking: Reported on 12/17/2019) 45 g 1       Allergies   Allergen Reactions     Guaifenesin Nausea       Social History     Socioeconomic History     Marital status:      Spouse name: Not on file     Number of children: 0     Years of education: Not on file     Highest education level: Not on file   Occupational History     Employer: UNEMPLOYED   Social Needs     Financial resource strain: Not on file     Food insecurity:     Worry: Not on file     Inability: Not on file     Transportation needs:     Medical: Not on file     Non-medical: Not on file   Tobacco Use     Smoking status: Never Smoker     Smokeless tobacco: Never Used   Substance and Sexual Activity     Alcohol use: No     Drug use: No     Sexual activity: Yes     Partners: Male   Lifestyle     Physical activity:     Days per week: Not on file     Minutes per session: Not on file     Stress: Not on file   Relationships     Social connections:     Talks on phone: Not on file     Gets together: Not on file     Attends [employer] service: Not on file     Active member of club or organization: Not on file     Attends meetings of clubs or organizations: Not on file     Relationship status: Not on file     Intimate partner violence:     Fear of current or ex partner: Not on file     Emotionally abused: Not on file     Physically abused: Not on file     Forced sexual activity: Not on file   Other Topics Concern     Parent/sibling w/ CABG, MI or angioplasty before 65F 55M? No   Social History Narrative     Not on file       Family History   Problem Relation Age of Onset     Hypertension Mother      Asthma Mother      Ulcerative Colitis Mother       "Hypertension Father          ROS:  All negative except as above.      EXAM:  /74 (BP Location: Right arm, Cuff Size: Adult Regular)   Pulse 77   Ht 1.588 m (5' 2.5\")   Wt 68.3 kg (150 lb 9.6 oz)   LMP 11/30/2019 (Exact Date)   SpO2 99%   BMI 27.11 kg/m    General:  WNWD female, NAD  Alert  Oriented x 3  Gait:  Normal  Skin:  Normal skin turgor  Neurologic:  CN grossly intact, good sensation.    HEENT:  NC/AT, EOMI  Neck:  No masses palpated, symmetrical, carotids +2/4, no bruits heard  Heart:  RRR  Lungs:  Clear   Breasts:  Symmetrical, no dimpling noted, no masses palpated, no discharge expressed  Abdomen:  Non-tender, non-distended.  Vulva: No external lesions, normal hair distribution, no adenopathy  BUS:  Normal, no masses noted  Urethra:  No hypermobility noted  Urethral meatus:  No masses noted  Vagina: Moist, pink, no abnormal discharge, well rugated, no lesions  Cervix: Smooth, pink, no visible lesions  Uterus: 6-8 week size, anteverted, non-tender, mobile  Ovaries: No mass, non-tender, mobile  Perianal:  No masses noted.   Extremities:  No clubbing, cyanosis, or edema      ASSESSMENT/PLAN   Annual examination   We discussed her medication/herbal medications used and she should discontinue them due to her desire for pregnancy (she has lead in the steven for face and she does not know the name of the \"natural\" product for the acne).  She has not started PNV for the folic acid.    Low fat diet, weight bearing exercises and self breast exams on a monthly basis have been recommended.  I have discussed with patient the risks, benefits, medications, treatment options and modalities.   I have instructed the patient to call or schedule a follow-up appointment if any problems.    "

## 2019-12-20 LAB
COPATH REPORT: NORMAL
PAP: NORMAL

## 2019-12-24 LAB
FINAL DIAGNOSIS: NORMAL
HPV HR 12 DNA CVX QL NAA+PROBE: NEGATIVE
HPV16 DNA SPEC QL NAA+PROBE: NEGATIVE
HPV18 DNA SPEC QL NAA+PROBE: NEGATIVE
SPECIMEN DESCRIPTION: NORMAL
SPECIMEN SOURCE CVX/VAG CYTO: NORMAL

## 2020-01-24 ENCOUNTER — DOCUMENTATION ONLY (OUTPATIENT)
Dept: LAB | Facility: CLINIC | Age: 40
End: 2020-01-24

## 2020-01-24 DIAGNOSIS — R79.89 HIGH SERUM VITAMIN B12: Primary | ICD-10-CM

## 2020-01-27 DIAGNOSIS — R79.89 HIGH SERUM VITAMIN B12: ICD-10-CM

## 2020-01-27 PROCEDURE — 82607 VITAMIN B-12: CPT | Performed by: FAMILY MEDICINE

## 2020-01-27 PROCEDURE — 36415 COLL VENOUS BLD VENIPUNCTURE: CPT | Performed by: FAMILY MEDICINE

## 2020-01-28 LAB — VIT B12 SERPL-MCNC: 305 PG/ML (ref 193–986)

## 2020-01-31 NOTE — PROGRESS NOTES
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  A few nights cannot sleep.  Itching behind the rash    Patient Active Problem List   Diagnosis     Moderate dysplasia of cervix (ROSAURA II)     Fibroid uterus     Dysmenorrhea     CARDIOVASCULAR SCREENING; LDL GOAL LESS THAN 160     Female infertility of other specified origin     Iron deficiency anemia due to chronic blood loss     Past Surgical History:   Procedure Laterality Date     CRYOTHERAPY, CERVICAL  12/2012     LASER CO2 VULVA  12/2011    small condyloma       MYOMECTOMY UTERUS  8/3/2011    2.5 cm/ 4.5 cm/ 6.5 and 7.5 cm  surgery performed in [place].       MYOMECTOMY UTERUS  03/18/2019    30 uterine leiomyoma removed.       Social History     Tobacco Use     Smoking status: Never Smoker     Smokeless tobacco: Never Used   Substance Use Topics     Alcohol use: No     Family History   Problem Relation Age of Onset     Hypertension Mother      Asthma Mother      Ulcerative Colitis Mother      Hypertension Father            Review of Systems   ROS COMP: Constitutional, HEENT, cardiovascular, pulmonary, gi and gu systems are negative, except as otherwise noted.      Objective    /64   Pulse 81   Temp 97.1  F (36.2  C) (Oral)   Wt 69.4 kg (153 lb)   SpO2 100%   BMI 27.54 kg/m    Body mass index is 27.54 kg/m .  Physical Exam   GENERAL: healthy, alert and no distress  NECK: no adenopathy and thyroid normal to palpation  RESP: lungs clear to auscultation - no rales, rhonchi or wheezes  CV: regular rate and rhythm, normal S1 S2, no S3 or S4, no murmur, click or rub, no peripheral edema   MS:  "no gross musculoskeletal defects noted, no edema    Diagnostic Test Results:  Results for orders placed or performed in visit on 02/03/20   Basic metabolic panel  (Ca, Cl, CO2, Creat, Gluc, K, Na, BUN)     Status: None   Result Value Ref Range    Sodium 140 133 - 144 mmol/L    Potassium 4.0 3.4 - 5.3 mmol/L    Chloride 109 94 - 109 mmol/L    Carbon Dioxide 27 20 - 32 mmol/L    Anion Gap 4 3 - 14 mmol/L    Glucose 80 70 - 99 mg/dL    Urea Nitrogen 10 7 - 30 mg/dL    Creatinine 0.60 0.52 - 1.04 mg/dL    GFR Estimate >90 >60 mL/min/[1.73_m2]    GFR Estimate If Black >90 >60 mL/min/[1.73_m2]    Calcium 8.8 8.5 - 10.1 mg/dL   Hemoglobin     Status: None   Result Value Ref Range    Hemoglobin 12.1 11.7 - 15.7 g/dL     Assessment & Plan     Ethel was seen today for results and referral.    Diagnoses and all orders for this visit:    Pain of right upper arm  -     [facility] REFERR[facility]; Future    Pain in scapula  -     [facility] REFERR[facility]; Future    Neck problem: Cracking  -     [facility] REFERRAL; Future    Fatigue, unspecified type  -     Basic metabolic panel  (Ca, Cl, CO2, Creat, Gluc, K, Na, BUN)  -     Hemoglobin  -     Vitamin D Deficiency      BMI:   Estimated body mass index is 27.54 kg/m  as calculated from the following:    Height as of 12/17/19: 1.588 m (5' 2.5\").    Weight as of this encounter: 69.4 kg (153 lb).   Weight management plan: Discussed healthy diet and exercise guidelines    Return in about 1 month (around 3/3/2020) for Routine Visit.    Israel Ramirez MD  [facility]        "

## 2020-02-03 ENCOUNTER — OFFICE VISIT (OUTPATIENT)
Dept: FAMILY MEDICINE | Facility: CLINIC | Age: 40
End: 2020-02-03
Payer: COMMERCIAL

## 2020-02-03 VITALS
TEMPERATURE: 97.1 F | OXYGEN SATURATION: 100 % | WEIGHT: 153 LBS | HEART RATE: 81 BPM | DIASTOLIC BLOOD PRESSURE: 64 MMHG | SYSTOLIC BLOOD PRESSURE: 114 MMHG | BODY MASS INDEX: 27.54 KG/M2

## 2020-02-03 DIAGNOSIS — R53.83 FATIGUE, UNSPECIFIED TYPE: ICD-10-CM

## 2020-02-03 DIAGNOSIS — M89.8X1 PAIN IN SCAPULA: ICD-10-CM

## 2020-02-03 DIAGNOSIS — R68.89 NECK PROBLEM: ICD-10-CM

## 2020-02-03 DIAGNOSIS — M79.621 PAIN OF RIGHT UPPER ARM: Primary | ICD-10-CM

## 2020-02-03 LAB — HGB BLD-MCNC: 12.1 G/DL (ref 11.7–15.7)

## 2020-02-03 PROCEDURE — 99213 OFFICE O/P EST LOW 20 MIN: CPT | Performed by: FAMILY MEDICINE

## 2020-02-03 PROCEDURE — 36415 COLL VENOUS BLD VENIPUNCTURE: CPT | Performed by: FAMILY MEDICINE

## 2020-02-03 PROCEDURE — 85018 HEMOGLOBIN: CPT | Performed by: FAMILY MEDICINE

## 2020-02-03 PROCEDURE — 82306 VITAMIN D 25 HYDROXY: CPT | Performed by: FAMILY MEDICINE

## 2020-02-03 PROCEDURE — 80048 BASIC METABOLIC PNL TOTAL CA: CPT | Performed by: FAMILY MEDICINE

## 2020-02-04 LAB
ANION GAP SERPL CALCULATED.3IONS-SCNC: 4 MMOL/L (ref 3–14)
BUN SERPL-MCNC: 10 MG/DL (ref 7–30)
CALCIUM SERPL-MCNC: 8.8 MG/DL (ref 8.5–10.1)
CHLORIDE SERPL-SCNC: 109 MMOL/L (ref 94–109)
CO2 SERPL-SCNC: 27 MMOL/L (ref 20–32)
CREAT SERPL-MCNC: 0.6 MG/DL (ref 0.52–1.04)
GFR SERPL CREATININE-BSD FRML MDRD: >90 ML/MIN/{1.73_M2}
GLUCOSE SERPL-MCNC: 80 MG/DL (ref 70–99)
POTASSIUM SERPL-SCNC: 4 MMOL/L (ref 3.4–5.3)
SODIUM SERPL-SCNC: 140 MMOL/L (ref 133–144)

## 2020-02-05 LAB — DEPRECATED CALCIDIOL+CALCIFEROL SERPL-MC: 19 UG/L (ref 20–75)

## 2020-02-25 ENCOUNTER — THERAPY VISIT (OUTPATIENT)
Dept: PHYSICAL THERAPY | Facility: CLINIC | Age: 40
End: 2020-02-25
Attending: FAMILY MEDICINE
Payer: COMMERCIAL

## 2020-02-25 DIAGNOSIS — R68.89 NECK PROBLEM: ICD-10-CM

## 2020-02-25 DIAGNOSIS — M79.621 PAIN OF RIGHT UPPER ARM: ICD-10-CM

## 2020-02-25 DIAGNOSIS — M89.8X1 PAIN IN SCAPULA: ICD-10-CM

## 2020-02-25 PROCEDURE — 97161 PT EVAL LOW COMPLEX 20 MIN: CPT | Mod: GP | Performed by: PHYSICAL THERAPIST

## 2020-02-25 PROCEDURE — 97110 THERAPEUTIC EXERCISES: CPT | Mod: GP | Performed by: PHYSICAL THERAPIST

## 2020-02-25 NOTE — LETTER
SUMAYA ESPINOSA PT  [address]  [address]  [place] [address]  [phone]    2020    Re: Ethel Murillo   :   1980  MRN:  [id number]   REFERRING PHYSICIAN:   MD SUMAYA Marquez PT  Date of Initial Evaluation:  2020  Visits:  Rxs Used: 1  Reason for Referral:     Pain of right upper arm  Pain in scapula  Neck problem    EVALUATION SUMMARY    [facility] Initial Evaluation  Subjective:  Patient Entered Health History - See Therapist Evaluation below  Ethel Murillo being seen for pt reports neck, shoulder, UE pain w/ some numbness at times .   Date of Onset: +4 yrs ago when doing lots of desk work    Problem occurred: computer use   and reported as 3/10 on pain scale.  General health as reported by patient is good.  Pertinent medical history includes: anemia.   Red flags:  None as reported by patient.     Surgeries include:  Other.    Current medications:  None.    Current occupation is  .      Therapist Generated HPI Evaluation  Type of problem:  Bilateral shoulders.  This is a chronic condition.  Condition occurred with:  Unknown cause.  Where condition occurred: for unknown reasons.  Patient reports pain:  Upper arm, upper trap and posterior.  Pain is described as aching and other and is intermittent.  Pain radiates to:  Cervical, shoulder, upper arm, hand and lower arm. Pain is worse during the day.  Since onset symptoms are gradually worsening.  Associated symptoms:  Loss of motion/stiffness, numbness and tingling. Symptoms are exacerbated by using arm at shoulder level and lifting  and relieved by nothing.    Restrictions due to condition include:  Working in normal job without restrictions.  Barriers include:  None as reported by patient.      Re: Ethel Murillo   :   1980                 Objective:  Cervical/Thoracic Evaluation  Cervical AROM: normal       Cervical Stability/Joint Clearing:    Left  positive at:TOS Screen  Right positive at:  1st Rib; 1st Rib Expansion and TOS Screen    Shoulder Evaluation:  Special Tests:  Special tests assessed shoulder: +Susie bilat, Rt>left 30seconds     Palpation:    Left shoulder tenderness present at:  Supraspinatus; Levator; Rhomboids and Upper Trap  Right shoulder tenderness present at: Supraspinatus; Subscapularis; Levator; Rhomboids and Upper Trap    Mobility Tests:    Glenohumeral anterior right:  Hypomobile  Glenohumeral posterior right:  Hypomobile  Glenohumeral inferior right:  Hypomobile    Scapulothoracic right:  Hypomobile  Scapulohumeral rhythm right:  Hypomobile         Assessment/Plan:    Patient is a 39 year old female with cervical, thoracic and both sides shoulder complaints.    Patient has the following significant findings with corresponding treatment plan.                Diagnosis 1:  TOS bilat   Pain -  manual therapy and self management  Decreased ROM/flexibility - manual therapy, therapeutic exercise and home program  Decreased joint mobility - manual therapy and therapeutic exercise  Decreased proprioception - neuro re-education and therapeutic activities  Impaired muscle performance - neuro re-education  Decreased function - therapeutic activities  Diagnosis 2:  Neck pain    Pain -  manual therapy, self management, directional preference exercise and home program  Impaired muscle performance - neuro re-education and home program  Decreased function - therapeutic activities  Impaired posture - neuro re-education  Diagnosis 3:  Shoulder UE pain   Pain -  manual therapy and self management  Decreased ROM/flexibility - manual therapy and therapeutic exercise  Decreased joint mobility - manual therapy and therapeutic exercise  Impaired muscle performance - neuro re-education  Decreased function - therapeutic activities     Therapy Evaluation Codes:   1) History comprised of:   Personal factors that impact the plan of care:      Coping style, Overall  behavior pattern and Past/current experiences.    Comorbidity factors that impact the plan of care are:      None.    Re: Ethel Murillo   :   1980     Medications impacting care: Pain.  2) Examination of Body Systems comprised of:   Body structures and functions that impact the plan of care:      Cervical spine, Shoulder and Thoracic Spine.   Activity limitations that impact the plan of care are:      Cooking, Driving and Reading/Computer work.  3) Clinical presentation characteristics are:   Stable/Uncomplicated.  4) Decision-Making    Low complexity using standardized patient assessment instrument and/or measureable assessment of functional outcome.  Cumulative Therapy Evaluation is: Low complexity.    Previous and current functional limitations:  (See Goal Flow Sheet for this information)    Short term and Long term goals: (See Goal Flow Sheet for this information)     Communication ability:  Patient appears to be able to clearly communicate and understand verbal and written communication and follow directions correctly.  Treatment Explanation - The following has been discussed with the patient:   RX ordered/plan of care  Anticipated outcomes  Possible risks and side effects  This patient would benefit from PT intervention to resume normal activities.   Rehab potential is good.    Frequency:  1 X week, once daily  Duration:  for 8 weeks  Discharge Plan:  Achieve all LTG.  Independent in home treatment program.  Reach maximal therapeutic benefit.    Please refer to the daily flowsheet for treatment today, total treatment time and time spent performing 1:1 timed codes.         Thank you for your referral.    INQUIRIES  Therapist: HERMINIA Edwards PT  [address]  [address]  [place] [address]  Phone: [phone]  Fax: [phone]

## 2020-02-26 NOTE — PROGRESS NOTES
[facility] Initial Evaluation  Subjective:  The history is provided by the patient.   Patient Entered Health History - See Therapist Evaluation below  Ethel Murillo being seen for pt reports neck, shoulder, UE pain w/ some numbness at times .     Date of Onset: +4 yrs ago when doing lots of desk work    Problem occurred: computer use   and reported as 3/10 on pain scale.  General health as reported by patient is good.  Pertinent medical history includes: anemia.   Red flags:  None as reported by patient.     Surgeries include:  Other.    Current medications:  None.    Current occupation is  .                     Therapist Generated HPI Evaluation         Type of problem:  Bilateral shoulders.    This is a chronic condition.  Condition occurred with:  Unknown cause.  Where condition occurred: for unknown reasons.  Patient reports pain:  Upper arm, upper trap and posterior.  Pain is described as aching and other and is intermittent.  Pain radiates to:  Cervical, shoulder, upper arm, hand and lower arm. Pain is worse during the day.  Since onset symptoms are gradually worsening.  Associated symptoms:  Loss of motion/stiffness, numbness and tingling. Symptoms are exacerbated by using arm at shoulder level and lifting  and relieved by nothing.      Restrictions due to condition include:  Working in normal job without restrictions.  Barriers include:  None as reported by patient.    Physical Exam                    Objective:  System              Cervical/Thoracic Evaluation  Cervical AROM: normal                       Cervical Stability/Joint Clearing:    Left positive at:TOS Screen  Right positive at:  1st Rib; 1st Rib Expansion and TOS Screen           Shoulder Evaluation:      Special Tests:  Special tests assessed shoulder: +Susie bilat, Rt>left 30seconds       Palpation:    Left shoulder tenderness present at:  Supraspinatus; Levator; Rhomboids and Upper Trap  Right shoulder  tenderness present at: Supraspinatus; Subscapularis; Levator; Rhomboids and Upper Trap  Mobility Tests:    Glenohumeral anterior right:  Hypomobile  Glenohumeral posterior right:  Hypomobile  Glenohumeral inferior right:  Hypomobile      Scapulothoracic right:  Hypomobile    Scapulohumeral rhythm right:  Hypomobile                                   General     ROS    Assessment/Plan:    Patient is a 39 year old female with cervical, thoracic and both sides shoulder complaints.    Patient has the following significant findings with corresponding treatment plan.                Diagnosis 1:  TOS bilat   Pain -  manual therapy and self management  Decreased ROM/flexibility - manual therapy, therapeutic exercise and home program  Decreased joint mobility - manual therapy and therapeutic exercise  Decreased proprioception - neuro re-education and therapeutic activities  Impaired muscle performance - neuro re-education  Decreased function - therapeutic activities  Diagnosis 2:  Neck pain    Pain -  manual therapy, self management, directional preference exercise and home program  Impaired muscle performance - neuro re-education and home program  Decreased function - therapeutic activities  Impaired posture - neuro re-education  Diagnosis 3:  Shoulder UE pain   Pain -  manual therapy and self management  Decreased ROM/flexibility - manual therapy and therapeutic exercise  Decreased joint mobility - manual therapy and therapeutic exercise  Impaired muscle performance - neuro re-education  Decreased function - therapeutic activities     Therapy Evaluation Codes:   1) History comprised of:   Personal factors that impact the plan of care:      Coping style, Overall behavior pattern and Past/current experiences.    Comorbidity factors that impact the plan of care are:      None.     Medications impacting care: Pain.  2) Examination of Body Systems comprised of:   Body structures and functions that impact the plan of care:       Cervical spine, Shoulder and Thoracic Spine.   Activity limitations that impact the plan of care are:      Cooking, Driving and Reading/Computer work.  3) Clinical presentation characteristics are:   Stable/Uncomplicated.  4) Decision-Making    Low complexity using standardized patient assessment instrument and/or measureable assessment of functional outcome.  Cumulative Therapy Evaluation is: Low complexity.    Previous and current functional limitations:  (See Goal Flow Sheet for this information)    Short term and Long term goals: (See Goal Flow Sheet for this information)     Communication ability:  Patient appears to be able to clearly communicate and understand verbal and written communication and follow directions correctly.  Treatment Explanation - The following has been discussed with the patient:   RX ordered/plan of care  Anticipated outcomes  Possible risks and side effects  This patient would benefit from PT intervention to resume normal activities.   Rehab potential is good.    Frequency:  1 X week, once daily  Duration:  for 8 weeks  Discharge Plan:  Achieve all LTG.  Independent in home treatment program.  Reach maximal therapeutic benefit.    Please refer to the daily flowsheet for treatment today, total treatment time and time spent performing 1:1 timed codes.

## 2020-03-02 ENCOUNTER — HEALTH MAINTENANCE LETTER (OUTPATIENT)
Age: 40
End: 2020-03-02

## 2020-06-26 NOTE — PATIENT INSTRUCTIONS
[facility]    If you have any questions regarding to your visit please contact your care team:       Team Purple:   Clinic Hours Telephone Number   Dr. Mariya Tidwell   7am-7pm  Monday - Thursday   7am-5pm  Fridays  [phone]  (Appointment scheduling available 24/7)    Questions about your recent visit?   Team Line:  [phone]   Urgent Care - [place] and [facility] - 11am-9pm Monday-Friday Saturday-Sunday- 9am-5pm   [place] - 5pm-9pm Monday-Friday Saturday-Sunday- 9am-5pm  [phone] - [place]  [phone] - [place]       What options do I have for a visit other than an office visit? We offer electronic visits (e-visits) and telephone visits, when medically appropriate.  Please check with your medical insurance to see if these types of visits are covered, as you will be responsible for any charges that are not paid by your insurance.      You can use [employer] (secure electronic communication) to access to your chart, send your primary care provider a message, or make an appointment. Ask a team member how to get started.     For a price quote for your services, please call our Consumer Price Line at [phone] or our Imaging Cost estimation line at [phone] (for imaging tests).    Melania Stokes MA     Suturegard Body: The suture ends were repeatedly re-tightened and re-clamped to achieve the desired tissue expansion.

## 2020-09-28 NOTE — NURSING NOTE
"Chief Complaint   Patient presents with     Abdominal Cramping     Has had abdominal discomfort and feels lump in abdomin has history of fibroids       Initial /67 (BP Location: Right arm, Cuff Size: Adult Regular)  Pulse 85  Wt 141 lb 6.4 oz (64.1 kg)  LMP 05/23/2017 (Exact Date)  SpO2 100%  BMI 24.97 kg/m2 Estimated body mass index is 24.97 kg/(m^2) as calculated from the following:    Height as of 1/23/17: 5' 3.1\" (1.603 m).    Weight as of this encounter: 141 lb 6.4 oz (64.1 kg).  Medication Reconciliation: complete   JUANJOSE Burr 6/14/2017         " no lesions,  no deformities,  no traumatic injuries,  no significant scars are present,  chest wall non-tender,  no masses present,  tactile fremitus is normal, breathing is unlabored without accessory muscle use, normal breath sounds

## 2020-12-14 ENCOUNTER — HEALTH MAINTENANCE LETTER (OUTPATIENT)
Age: 40
End: 2020-12-14

## 2021-02-27 ENCOUNTER — HEALTH MAINTENANCE LETTER (OUTPATIENT)
Age: 41
End: 2021-02-27

## 2021-03-23 NOTE — PROGRESS NOTES
Assessment & Plan   Ethel is a 41 yo F with hx of uterine fibroids with fertility issues, iron def anemia, vit d def presenting for routine follow up    Care gaps: PAP/HPV (follows with OB)    Fatigue, unspecified type  Differentials include hypothyroidism, anemia, vitamin D deficiency, depression.  We will check baseline labs  - TSH with free T4 reflex  - Hemoglobin    Iron deficiency anemia, unspecified iron deficiency anemia type  Has had ongoing issues with iron deficiency; had extensive gynecological procedure last year.  - Hemoglobin  - Iron and iron binding capacity    Vitamin D deficiency     Takes supplement for vitamin D.  Recheck levels  - Vitamin D Deficiency    Eczema, unspecified type  Has had ongoing eczema, was seen by dermatology and given 2 types of steroid creams 1 for the arms and the other for genital area.  Discussed the side effects of very potent steroids; she uses occasionally.  We will do the refill.  - triamcinolone (KENALOG) 0.1 % external cream; Apply topically 2 times daily  - clobetasol (TEMOVATE) 0.05 % external cream; Apply topically 2 times daily    Hypertriglyceridemia  Discussed healthy lifestyle with healthy diet.  We will check triglycerides again.  - Lipid Profile (Chol, Trig, HDL, LDL calc).    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  Follow-up for annual physical    Return in about 3 months (around 6/24/2021) for Routine Visit.    Israel Ramirez MD  [facility]    Trae Davenport is a 40 year old who presents for the following health issues;    HPI     Chief Complaint   Patient presents with     Fatigue     requesting labs for Vitamin D, Iron, Hemoglobin, Thyroid     Medication Request     Excema creams     Fatigue:  Been feeling fatigued; possibly from lots of work.    Eczema creams:   Had prescription from dermatology   Kenalog: for  arm   Clobetasol    Anemia:    Check Hgb and iron    Cholesterol:    Been having high triglyceride.    Review of Systems   Constitutional, cardiovascular, pulmonary, gi and gu systems are negative, except as otherwise noted.      Objective    /62   Pulse 99   Temp 98.8  F (37.1  C) (Oral)   Wt 69.9 kg (154 lb)   SpO2 99%   BMI 27.72 kg/m    Body mass index is 27.72 kg/m .  Physical Exam   GENERAL: healthy, alert and no distress  NECK: no adenopathy and thyroid normal to palpation  RESP: lungs clear to auscultation - no rales, rhonchi or wheezes  CV: regular rate and rhythm, no murmur, click or rub, no peripheral edema   MS: no gross musculoskeletal defects noted, no edema

## 2021-03-24 ENCOUNTER — OFFICE VISIT (OUTPATIENT)
Dept: FAMILY MEDICINE | Facility: CLINIC | Age: 41
End: 2021-03-24
Payer: COMMERCIAL

## 2021-03-24 VITALS
TEMPERATURE: 98.8 F | HEART RATE: 99 BPM | BODY MASS INDEX: 27.72 KG/M2 | DIASTOLIC BLOOD PRESSURE: 62 MMHG | SYSTOLIC BLOOD PRESSURE: 108 MMHG | OXYGEN SATURATION: 99 % | WEIGHT: 154 LBS

## 2021-03-24 DIAGNOSIS — L30.9 ECZEMA, UNSPECIFIED TYPE: ICD-10-CM

## 2021-03-24 DIAGNOSIS — D50.9 IRON DEFICIENCY ANEMIA, UNSPECIFIED IRON DEFICIENCY ANEMIA TYPE: ICD-10-CM

## 2021-03-24 DIAGNOSIS — E78.1 HYPERTRIGLYCERIDEMIA: ICD-10-CM

## 2021-03-24 DIAGNOSIS — E55.9 VITAMIN D DEFICIENCY: ICD-10-CM

## 2021-03-24 DIAGNOSIS — R53.83 FATIGUE, UNSPECIFIED TYPE: Primary | ICD-10-CM

## 2021-03-24 DIAGNOSIS — Z00.00 HEALTHCARE MAINTENANCE: ICD-10-CM

## 2021-03-24 LAB
CHOLEST SERPL-MCNC: 206 MG/DL
HDLC SERPL-MCNC: 55 MG/DL
HGB BLD-MCNC: 12.9 G/DL (ref 11.7–15.7)
IRON SATN MFR SERPL: 17 % (ref 15–46)
IRON SERPL-MCNC: 56 UG/DL (ref 35–180)
LDLC SERPL CALC-MCNC: 137 MG/DL
NONHDLC SERPL-MCNC: 151 MG/DL
TIBC SERPL-MCNC: 339 UG/DL (ref 240–430)
TRIGL SERPL-MCNC: 70 MG/DL
TSH SERPL DL<=0.005 MIU/L-ACNC: 1.65 MU/L (ref 0.4–4)

## 2021-03-24 PROCEDURE — 84443 ASSAY THYROID STIM HORMONE: CPT | Performed by: FAMILY MEDICINE

## 2021-03-24 PROCEDURE — 80061 LIPID PANEL: CPT | Performed by: FAMILY MEDICINE

## 2021-03-24 PROCEDURE — 83540 ASSAY OF IRON: CPT | Performed by: FAMILY MEDICINE

## 2021-03-24 PROCEDURE — 36415 COLL VENOUS BLD VENIPUNCTURE: CPT | Performed by: FAMILY MEDICINE

## 2021-03-24 PROCEDURE — 99214 OFFICE O/P EST MOD 30 MIN: CPT | Performed by: FAMILY MEDICINE

## 2021-03-24 PROCEDURE — 83550 IRON BINDING TEST: CPT | Performed by: FAMILY MEDICINE

## 2021-03-24 PROCEDURE — 82306 VITAMIN D 25 HYDROXY: CPT | Performed by: FAMILY MEDICINE

## 2021-03-24 PROCEDURE — 85018 HEMOGLOBIN: CPT | Performed by: FAMILY MEDICINE

## 2021-03-24 RX ORDER — TRIAMCINOLONE ACETONIDE 1 MG/G
CREAM TOPICAL 2 TIMES DAILY
Qty: 30 G | Refills: 1 | Status: SHIPPED | OUTPATIENT
Start: 2021-03-24 | End: 2021-11-17

## 2021-03-24 RX ORDER — CLOBETASOL PROPIONATE 0.5 MG/G
CREAM TOPICAL 2 TIMES DAILY
Qty: 30 G | Refills: 1 | Status: SHIPPED | OUTPATIENT
Start: 2021-03-24 | End: 2022-04-22

## 2021-03-25 LAB — DEPRECATED CALCIDIOL+CALCIFEROL SERPL-MC: 39 UG/L (ref 20–75)

## 2021-03-31 NOTE — TELEPHONE ENCOUNTER
Check blood sugars before meals and at bedtime. Maintain the log and bring it all your appointments, Please send the [facility]       No insulin  If glucose is less than 70     If she has low glucose which is less than 70 - give her 4 oz juice and recheck every 15 minutes until it is more than 100     If the bedtime sugars are less than 100 ,give a 15 gm snack.  And recheck until it is >100    1800 Kcal diet , low carb , high protein    Avoid juices and sodas     Lantus insulin 24 units at bed time     Novolog or Humalog 10 units before breakfast , 10 units before  lunch and 4 units before dinner   No humalog or Novolog at bedtime        Additional Novolog or Humalog for high sugars with meals     150-200 mg   Add 2 units     201-250 mg   Add 4  units     251-300 mg   Add 6 units     301-350 mg   Add 8 units     351-400 mg   Add 10 units         If sugars are more than 450 then take her to hospital Patient called RN hotline stating the soonest she could get in for blood transfusion is on 12/6/17 at [facility], and on 12/7/17 at [facility].   Per provider note patient should be scheduled Asap.  Routing to  to help with scheduling.  Bianca Glaser RN

## 2021-10-02 ENCOUNTER — HEALTH MAINTENANCE LETTER (OUTPATIENT)
Age: 41
End: 2021-10-02

## 2021-11-15 DIAGNOSIS — L30.9 ECZEMA, UNSPECIFIED TYPE: ICD-10-CM

## 2021-11-17 RX ORDER — TRIAMCINOLONE ACETONIDE 1 MG/G
CREAM TOPICAL 2 TIMES DAILY
Qty: 30 G | Refills: 1 | Status: SHIPPED | OUTPATIENT
Start: 2021-11-17 | End: 2022-04-19

## 2021-11-17 NOTE — TELEPHONE ENCOUNTER
Prescription approved per [facility] Refill Protocol.    Bianca Ferrara RN

## 2021-12-29 NOTE — PROGRESS NOTES
Ethel Murillo is a 38 year old female, , who presents today for follow up regarding the uterine leiomyoma.  She saw me last about 1.7 years ago.  She went back to [facility] and she used a medication a few times.  I have looked up the herbal medication and it is a poisonous product.  She states she used it 3 times and the fibroids decreased in size, but when she stopped, the fibroids returned to the pre-treatment size.  She continues to have the bloating and the increase in the abdominal girth she associates with the fibroids. She continues to have some bladder issues related with the fibroids also.     She has had an ultrasound in [facility] and at [facility] here in [place], but she does not have either ultrasound films or reports.      We reviewed the past ultrasound report and the films:      ULTRASOUND PELVIC COMPLETE WITH TRANSVAGINAL 2017 9:37 AM   HISTORY: Leiomyoma of uterus, unspecified. Abdominal distension (gaseous).   FINDINGS: Transvaginal images were performed to better evaluate the patient's uterus, ovaries and endometrial stripe.  The uterus is enlarged and lobulated measuring 17.2 x 8.4 x 14.2 cm. Numerous uterine fibroids are present. The largest is in the anterior left uterine body measuring 9.2 x 6.7 x 6.4 cm. Another is in the lower uterine segment possibly in the region of the cephalad portion of the cervix measuring 5.2 x 4.6 x 3.6 cm. Many of these fibroids appear to be submucosal in location and distort the endometrium.  Endometrial stripe measures 14 mm and is thickened for patient's age. The right ovary is not visualized. The left ovary is not visualized. No adnexal masses are present. No free pelvic fluid is present.                                                            IMPRESSION: Multiple uterine fibroids distorting the endometrial stripe which is mildly thickened. This endometrial thickening may be related to the multiple submucosal fibroids.       PELVIC  ULTRASOUND WITH ENDOVAGINAL TRANSDUCER IMAGING   1/25/2016 9:50 AM   HISTORY: Leiomyoma of uterus, unspecified.  TECHNIQUE:  Endovaginal sonography was added to the transabdominal exam to better evaluate the uterus and ovaries.  COMPARISON: Pelvic ultrasound 6/6/2014.  FINDINGS: Endometrium is 9 mm thick. Uterus measures 12.4 x 8.5 x 10.8 cm. Multiple uterine fibers again identified. These are difficult to correlate with the prior exam. Anterior right uterine fibroid is 4.5 x 4.8 x 3.9 cm. Anterior right uterine fibroid is 3.1 x 3.2 x 3.2 cm. Posterior left uterine fibroid is 4.8 x 4.9 x 4.9 cm. This fibroid is the largest of the measured fibroids, previously the largest fibroid in this region measured up to 4.5 cm. Anterior left uterine fibroid is 3.3 x 3.6 x 3.3 cm. The left ovary appears normal. The right ovary contains a 1.4 cm cystic lesion. There is trace pelvic fluid.  IMPRESSION:  1. Multiple uterine fibroids again identified. A posterior left uterine fibroid may be slightly larger compared to the prior study, and the other fibroids are not substantially changed.  2. 1.4 cm small cystic lesion in the right ovary.       Past Medical History:   Diagnosis Date     Cervical high risk HPV (human papillomavirus) test positive 11/2013, 1/2015    type 31, HR HPV     Eczema     of vulva     H/O colposcopy with cervical biopsy 2/2015    Bx & ECC - Negative     Infertility      LSIL (low grade squamous intraepithelial lesion) on Pap smear 11/2012    colp 11/2012 - ROSAURA 1/2     Uterine fibroid 11/2012       Past Surgical History:   Procedure Laterality Date     CRYOTHERAPY, CERVICAL  12/2012     LASER CO2 VULVA  12/2011    small condyloma       MYOMECTOMY UTERUS  8/3/2011    2.5 cm/ 4.5 cm/ 6.5 and 7.5 cm  surgery performed in [place].            Allergies   Allergen Reactions     Guaifenesin Nausea       Current Outpatient Medications   Medication Sig Dispense Refill     ferrous sulfate (IRON SUPPLEMENT) 325 (65 Fe) MG  tablet Take 1 tablet (325 mg) by mouth 2 times daily 180 tablet 0     vitamin D3 (CHOLECALCIFEROL) 2000 units tablet Take 1 tablet by mouth daily       cholecalciferol (VITAMIN D3) 64400 UNITS capsule Take 1 capsule (50,000 Units) by mouth once a week (Patient not taking: Reported on 2/12/2019) 8 capsule 0     triamcinolone (KENALOG) 0.1 % external cream Apply topically 2 times daily 45 g 1       Social History     Socioeconomic History     Marital status:      Spouse name: Not on file     Number of children: 0     Years of education: Not on file     Highest education level: Not on file   Social Needs     Financial resource strain: Not on file     Food insecurity - worry: Not on file     Food insecurity - inability: Not on file     Transportation needs - medical: Not on file     Transportation needs - non-medical: Not on file   Occupational History     Employer: UNEMPLOYED   Tobacco Use     Smoking status: Never Smoker     Smokeless tobacco: Never Used   Substance and Sexual Activity     Alcohol use: No     Drug use: No     Sexual activity: Yes     Partners: Male   Other Topics Concern     Parent/sibling w/ CABG, MI or angioplasty before 65F 55M? No   Social History Narrative     Not on file       Family History   Problem Relation Age of Onset     Hypertension Mother      Asthma Mother      Ulcerative Colitis Mother      Hypertension Father        Review of Systems:  10 point ROS of systems including Constitutional, Eyes, Respiratory, Cardiovascular, Gastroenterology, Genitourinary, Integumentary, Muscularskeletal, Psychiatric were all negative except for pertinent positives noted in my HPI and in the PMH.      Exam  /64 (BP Location: Right arm, Cuff Size: Adult Regular)   Pulse 78   Wt 67.6 kg (149 lb)   LMP 01/10/2019 (Exact Date)   SpO2 100%   BMI 26.39 kg/m    General:  WNWD female, NAD  Alert  Oriented x 3  Gait:  Normal  Skin:  Normal skin turgor  HEENT:  NC/AT, EOMI  Lungs:  Good respiratory  effort   Abdomen:  Non-tender, non-distended.  Pelvic exam:  Not performed  Extremities:  No clubbing, no cyanosis and no edema.      Assessment  Uterine leiomyoma       Plan  She has a history of uterine leiomyoma and also prior surgery for these. She has had documented increase in uterine and leiomyoma size in the past.     The uterine size is likely related to the fibroids.  However, with the bloating sensation now occurring through out the cycle, it would be best to attempt to rule out other causes, such as ovarian issues.  With the size of the uterus and fibroids, the ultrasound might not be able to visualize the ovaries completely.   She is interested in the repeat ultrasound for the size of the fibroids and the size of the uterus.    We reviewed the medical and the surgical options for the uterine leiomyoma.  She desires to conserve the uterus.   Using the GnRH agonists will cause an up regulation followed by a down regulation of the hormones, but once stopped, the uterine size and fibroid sizes will likely return to the pretreatment sizes.    She is likely to choose the myomectomy.    The ultrasound is ordered.  She is to return to clinic for review of the ultrasound report and films and to develop a management plan.      TT 30 min  CT as noted in the Plan and review of records as noted in the HPI, greater than 70%    Nico Cottrell MD       Unknown

## 2022-03-19 ENCOUNTER — HEALTH MAINTENANCE LETTER (OUTPATIENT)
Age: 42
End: 2022-03-19

## 2022-04-15 ENCOUNTER — TELEPHONE (OUTPATIENT)
Dept: OBGYN | Facility: CLINIC | Age: 42
End: 2022-04-15
Payer: COMMERCIAL

## 2022-04-15 NOTE — TELEPHONE ENCOUNTER
Reason for Call:  Same Day Appointment, Requested Provider:  Nico Cottrell    PCP: Nico Cottrell    Reason for visit: poss pimple on breast x 1 wk. Vaginal itching-ongoing. Pt would like to be seen next week if poss.    Duration of symptoms:     Have you been treated for this in the past?     Additional comments:     Can we leave a detailed message on this number? YES    Phone number patient can be reached at: Home number on file [phone] (home)    Best Time:     Call taken on 4/15/2022 at 7:27 AM by Deidre Gtz

## 2022-04-19 ENCOUNTER — TELEPHONE (OUTPATIENT)
Dept: OBGYN | Facility: CLINIC | Age: 42
End: 2022-04-19

## 2022-04-19 ENCOUNTER — OFFICE VISIT (OUTPATIENT)
Dept: OBGYN | Facility: CLINIC | Age: 42
End: 2022-04-19
Payer: COMMERCIAL

## 2022-04-19 VITALS
WEIGHT: 154.4 LBS | DIASTOLIC BLOOD PRESSURE: 68 MMHG | SYSTOLIC BLOOD PRESSURE: 104 MMHG | OXYGEN SATURATION: 98 % | BODY MASS INDEX: 27.79 KG/M2 | HEART RATE: 90 BPM

## 2022-04-19 DIAGNOSIS — L29.2 VULVAR ITCHING: ICD-10-CM

## 2022-04-19 DIAGNOSIS — L30.9 ECZEMA, UNSPECIFIED TYPE: ICD-10-CM

## 2022-04-19 DIAGNOSIS — L30.9 ECZEMA, UNSPECIFIED TYPE: Primary | ICD-10-CM

## 2022-04-19 PROCEDURE — 88305 TISSUE EXAM BY PATHOLOGIST: CPT | Performed by: PATHOLOGY

## 2022-04-19 PROCEDURE — 99213 OFFICE O/P EST LOW 20 MIN: CPT | Mod: 25 | Performed by: OBSTETRICS & GYNECOLOGY

## 2022-04-19 PROCEDURE — 56605 BIOPSY OF VULVA/PERINEUM: CPT | Performed by: OBSTETRICS & GYNECOLOGY

## 2022-04-19 RX ORDER — TRIAMCINOLONE ACETONIDE 1 MG/G
CREAM TOPICAL 2 TIMES DAILY
Qty: 30 G | Refills: 1 | Status: SHIPPED | OUTPATIENT
Start: 2022-04-19 | End: 2022-04-19

## 2022-04-19 RX ORDER — TRIAMCINOLONE ACETONIDE 1 MG/G
CREAM TOPICAL
Qty: 45 G | Refills: 3 | Status: SHIPPED | OUTPATIENT
Start: 2022-04-19 | End: 2022-04-22

## 2022-04-19 NOTE — PROGRESS NOTES
Ethel is a 41 year old female, .    She presents today with some vulvar itching. She reports it is primarily on the right.  She has been able to see some skin changes.  She has been using a steroid cream every week (sometimes she uses it more often, sometimes three times a week).   The steroid cream (Kenalog) helps, but nothing else has helped.  When urine touches the area, she has pain.  She thought it was eczema, but Dermatology told her eczema does not usually occur in this area.    She does not use any soap, only water, for cleaning the area twice a day.      Past Medical History:   Diagnosis Date     Cervical high risk HPV (human papillomavirus) test positive 2013, 2015    type 31, HR HPV     Eczema     of vulva     H/O colposcopy with cervical biopsy 2015    Bx & ECC - Negative     Infertility      LSIL (low grade squamous intraepithelial lesion) on Pap smear 2012    colp 2012 - ROSAURA 1/2     Uterine fibroid 2012     Past Surgical History:   Procedure Laterality Date     CRYOTHERAPY, CERVICAL  2012     LASER CO2 VULVA  2011    small condyloma       MYOMECTOMY UTERUS  8/3/2011    2.5 cm/ 4.5 cm/ 6.5 and 7.5 cm  surgery performed in [place].       MYOMECTOMY UTERUS  2019    30 uterine leiomyoma removed.        Allergies   Allergen Reactions     Guaifenesin Nausea       Current Outpatient Medications   Medication Sig Dispense Refill     triamcinolone (KENALOG) 0.1 % external cream Every other day. 45 g 3     vitamin D3 (CHOLECALCIFEROL) 2000 units tablet Take 1 tablet by mouth daily       Prenatal Vit-Fe Fumarate-FA (PRENATAL MULTIVITAMIN  PLUS IRON) 27-1 MG TABS Take by mouth daily (Patient not taking: Reported on 2022)         Social History     Socioeconomic History     Marital status:      Spouse name: Not on file     Number of children: 0     Years of education: Not on file     Highest education level: Not on file   Occupational History     Employer: UNEMPLOYED    Tobacco Use     Smoking status: Never Smoker     Smokeless tobacco: Never Used   Substance and Sexual Activity     Alcohol use: No     Drug use: No     Sexual activity: Yes     Partners: Male   Other Topics Concern     Parent/sibling w/ CABG, MI or angioplasty before 65F 55M? No   Social History Narrative     Not on file     Social Determinants of Health     Financial Resource Strain: Not on file   Food Insecurity: Not on file   Transportation Needs: Not on file   Physical Activity: Not on file   Stress: Not on file   Social Connections: Not on file   Intimate Partner Violence: Not on file   Housing Stability: Not on file       Family History   Problem Relation Age of Onset     Hypertension Mother      Asthma Mother      Ulcerative Colitis Mother      Hypertension Father        Review of Systems:  10 point ROS of systems including Constitutional, Eyes, Respiratory, Cardiovascular, Gastroenterology, Genitourinary, Integumentary, Muscularskeletal, Psychiatric were all negative except for pertinent positives noted in my HPI and in the PMH.      Exam  /68 (BP Location: Right arm, Cuff Size: Adult Regular)   Pulse 90   Wt 70 kg (154 lb 6.4 oz)   LMP 04/05/2022 (Exact Date)   SpO2 98%   Breastfeeding No   BMI 27.79 kg/m    General:  WNWD female, NAD  Alert  Oriented x 3  Gait:  Normal  Skin:  Normal skin turgor  HEENT:  NC/AT, EOMI  Abdomen:  Non-tender, non-distended.  Vulva: thickening of the labia majora on the right, normal hair distribution, no adenopathy  BUS:  Normal, no masses noted  Urethral meatus:  No masses noted  Perianal:  No masses noted  Extremities:  No clubbing, no cyanosis and no edema.    Assessment  Vulvar itching  Vulvar skin changes      Plan  I recommend to have the vulvar biopsy.   I also recommend to use the steroid cream.  The prescription is written for her.        PROCEDURE NOTE:  The procedure was reviewed with patient.  After consenting to the procedure she was placed into the  dorsal lithotomy position.  The examination was performed.  The right labia majora was prepped with Betadine.  1% lidocaine was used for analgesia.  4 mm punch biopsy was performed.  The tissue was removed and hemostasis was achieved with Silver Nitrate.  She tolerated the procedure well.   Instruments were removed and the specimen was sent to pathology.  Results to the patient in 1 week,  when available.

## 2022-04-19 NOTE — TELEPHONE ENCOUNTER
[facility] Call Center    Phone Message    May a detailed message be left on voicemail: yes     Reason for Call: Medication Question or concern regarding medication   Prescription Clarification  Name of Medication: triamcinolone (KENALOG) 0.1 % external cream   Prescribing Provider: Dr. Cottrell   Pharmacy: [id number] [address] [place] [address]   What on the order needs clarification? Thony at pharmacy needs further clarification on the dosing details such as how many times per day or something such as AM/PM.  Please send the clarification or call with any questions.          Action Taken: Message routed to:  [facility] p [phone]    Travel Screening: Not Applicable

## 2022-04-21 LAB
PATH REPORT.COMMENTS IMP SPEC: NORMAL
PATH REPORT.COMMENTS IMP SPEC: NORMAL
PATH REPORT.FINAL DX SPEC: NORMAL
PATH REPORT.GROSS SPEC: NORMAL
PATH REPORT.MICROSCOPIC SPEC OTHER STN: NORMAL
PATH REPORT.RELEVANT HX SPEC: NORMAL
PHOTO IMAGE: NORMAL

## 2022-04-22 RX ORDER — TRIAMCINOLONE ACETONIDE 1 MG/G
CREAM TOPICAL
Qty: 45 G | Refills: 3 | Status: SHIPPED | OUTPATIENT
Start: 2022-04-22 | End: 2023-06-23

## 2022-04-22 NOTE — TELEPHONE ENCOUNTER
Per pharmacy and insurance, days supply needs to be listed, quantity to apply needs to be listed and for how long prescription is needed all need to be listed on prescription.    RN routing to provider to advise on new sig.    Kathy Cobos RN on 4/22/2022 at 9:54 AM

## 2022-06-10 ENCOUNTER — ANCILLARY PROCEDURE (OUTPATIENT)
Dept: MAMMOGRAPHY | Facility: CLINIC | Age: 42
End: 2022-06-10
Payer: COMMERCIAL

## 2022-06-10 ENCOUNTER — OFFICE VISIT (OUTPATIENT)
Dept: OBGYN | Facility: CLINIC | Age: 42
End: 2022-06-10
Payer: COMMERCIAL

## 2022-06-10 VITALS
HEIGHT: 63 IN | BODY MASS INDEX: 27.18 KG/M2 | WEIGHT: 153.4 LBS | HEART RATE: 88 BPM | SYSTOLIC BLOOD PRESSURE: 98 MMHG | DIASTOLIC BLOOD PRESSURE: 63 MMHG | OXYGEN SATURATION: 98 %

## 2022-06-10 DIAGNOSIS — Z01.419 ENCOUNTER FOR GYNECOLOGICAL EXAMINATION WITHOUT ABNORMAL FINDING: ICD-10-CM

## 2022-06-10 DIAGNOSIS — Z12.31 VISIT FOR SCREENING MAMMOGRAM: ICD-10-CM

## 2022-06-10 DIAGNOSIS — Z12.4 PAP SMEAR FOR CERVICAL CANCER SCREENING: Primary | ICD-10-CM

## 2022-06-10 PROCEDURE — G0145 SCR C/V CYTO,THINLAYER,RESCR: HCPCS | Performed by: OBSTETRICS & GYNECOLOGY

## 2022-06-10 PROCEDURE — 99396 PREV VISIT EST AGE 40-64: CPT | Mod: 25 | Performed by: OBSTETRICS & GYNECOLOGY

## 2022-06-10 PROCEDURE — 77067 SCR MAMMO BI INCL CAD: CPT | Mod: TC | Performed by: RADIOLOGY

## 2022-06-10 PROCEDURE — 87624 HPV HI-RISK TYP POOLED RSLT: CPT | Performed by: OBSTETRICS & GYNECOLOGY

## 2022-06-10 NOTE — PROGRESS NOTES
Ethel Murillo is a 41 year old female , who presents for annual exam.   No unusual bleeding, no incontinence, or unusual discharge.   The steroid cream has been working well for her.    Last cholesterol: Recent Labs   Lab Test 21  0820 19  0917   CHOL 206* 217*   HDL 55 59   * 137*   TRIG 70 107     Past Medical History:   Diagnosis Date     Cervical high risk HPV (human papillomavirus) test positive 2013, 2015    type 31, HR HPV     Eczema     of vulva     H/O colposcopy with cervical biopsy 2015    Bx & ECC - Negative     Infertility      LSIL (low grade squamous intraepithelial lesion) on Pap smear 2012    colp 2012 - ROSAURA 1/2     Uterine fibroid 2012       Past Surgical History:   Procedure Laterality Date     CRYOTHERAPY, CERVICAL  2012     LASER CO2 VULVA  2011    small condyloma       MYOMECTOMY UTERUS  8/3/2011    2.5 cm/ 4.5 cm/ 6.5 and 7.5 cm  surgery performed in [place].       MYOMECTOMY UTERUS  2019    30 uterine leiomyoma removed.       OB History    Para Term  AB Living   0 0 0 0 0 0   SAB IAB Ectopic Multiple Live Births   0 0 0 0 0       Gyn History:  Gynecological History         Patient's last menstrual period was 2022 (exact date).     STD HPV/No PID/NO IUD      history of abnormal pap smear:  YES   Last pap:   Lab Results   Component Value Date    PAP NIL 2019           Current Outpatient Medications   Medication Sig Dispense Refill     triamcinolone (KENALOG) 0.1 % external cream 0.5 g to vulva (once a day) every other day. 45 g 3     vitamin D3 (CHOLECALCIFEROL) 2000 units tablet Take 1 tablet by mouth daily       Prenatal Vit-Fe Fumarate-FA (PRENATAL MULTIVITAMIN  PLUS IRON) 27-1 MG TABS Take by mouth daily (Patient not taking: No sig reported)         Allergies   Allergen Reactions     Guaifenesin Nausea       Social History     Socioeconomic History     Marital status:      Spouse name: Not on file      "Number of children: 0     Years of education: Not on file     Highest education level: Not on file   Occupational History     Employer: UNEMPLOYED   Tobacco Use     Smoking status: Never Smoker     Smokeless tobacco: Never Used   Substance and Sexual Activity     Alcohol use: No     Drug use: No     Sexual activity: Yes     Partners: Male   Other Topics Concern     Parent/sibling w/ CABG, MI or angioplasty before 65F 55M? No   Social History Narrative     Not on file     Social Determinants of Health     Financial Resource Strain: Not on file   Food Insecurity: Not on file   Transportation Needs: Not on file   Physical Activity: Not on file   Stress: Not on file   Social Connections: Not on file   Intimate Partner Violence: Not on file   Housing Stability: Not on file       Family History   Problem Relation Age of Onset     Hypertension Mother      Asthma Mother      Ulcerative Colitis Mother      Hypertension Father          ROS:  All negative except as above.      EXAM:  BP 98/63 (BP Location: Right arm, Cuff Size: Adult Regular)   Pulse 88   Ht 1.6 m (5' 3\")   Wt 69.6 kg (153 lb 6.4 oz)   LMP 05/24/2022 (Exact Date)   SpO2 98%   Breastfeeding No   BMI 27.17 kg/m    General:  WNWD female, NAD  Alert  Oriented x 3  Gait:  Normal  Skin:  Normal skin turgor  Neurologic:  CN grossly intact, good sensation.    HEENT:  NC/AT, EOMI  Neck:  No masses palpated, symmetrical, carotids +2/4, no bruits heard  Heart:  RRR  Lungs:  Clear   Breasts:  Symmetrical, no dimpling noted, no masses palpated, no discharge expressed  Abdomen:  Non-tender, non-distended.  Vulva: No external lesions, normal hair distribution, no adenopathy  BUS:  Normal, no masses noted  Urethra:  No hypermobility noted  Urethral meatus:  No masses noted  Vagina: Moist, pink, no abnormal discharge, well rugated, no lesions  Cervix: Smooth, pink, no visible lesions  Uterus: Normal size, anteverted, non-tender, mobile  Ovaries: No mass, non-tender, " mobile  Perianal:  No masses noted.    Extremities:  No clubbing, cyanosis, or edema      ASSESSMENT/PLAN   Annual examination   Pap smear today   She had her mammogram today.  Results reviewed with her.    Low fat diet, weight bearing exercises and self breast exams on a monthly basis have been recommended.  I have discussed with patient the risks, benefits, medications, treatment options and modalities.   I have instructed the patient to call or schedule a follow-up appointment if any problems.

## 2022-06-14 LAB
BKR LAB AP GYN ADEQUACY: NORMAL
BKR LAB AP GYN INTERPRETATION: NORMAL
BKR LAB AP HPV REFLEX: NORMAL
BKR LAB AP LMP: NORMAL
BKR LAB AP PREVIOUS ABNORMAL: NORMAL
PATH REPORT.COMMENTS IMP SPEC: NORMAL
PATH REPORT.COMMENTS IMP SPEC: NORMAL
PATH REPORT.RELEVANT HX SPEC: NORMAL

## 2022-06-16 LAB
HUMAN PAPILLOMA VIRUS 16 DNA: NEGATIVE
HUMAN PAPILLOMA VIRUS 18 DNA: NEGATIVE
HUMAN PAPILLOMA VIRUS FINAL DIAGNOSIS: NORMAL
HUMAN PAPILLOMA VIRUS OTHER HR: NEGATIVE

## 2022-09-03 ENCOUNTER — HEALTH MAINTENANCE LETTER (OUTPATIENT)
Age: 42
End: 2022-09-03

## 2022-12-08 ENCOUNTER — NURSE TRIAGE (OUTPATIENT)
Dept: OBGYN | Facility: CLINIC | Age: 42
End: 2022-12-08

## 2022-12-08 NOTE — TELEPHONE ENCOUNTER
Patient transferred to RN via priority line. She states this AM she woke up and felt like she couldn't speak normally and described that she was trying to speak but she couldn't get words out and was slurring her speech. She also states she wasn't able to read as if she wasn't able to concentrate. She also stated she had a horrible headache and R eye was blurry. She states all symptoms have resolved now but headache is still present slightly. She also states she feels mild nausea.       RN reviewed red flag symptoms with patient instructed her to go to ED now. RN advised she either needs to call 911 or have someone drive her. Patient understood and agreed.     SHYLA Ferro, RN         Reason for Disposition    Headache or vomiting    Headache (with neurologic deficit)    Confusion, disorientation, or hallucinations is main symptom    Additional Information    Negative: Difficult to awaken or acting confused (disoriented, slurred speech) and has diabetes mellitus    Negative: Difficult to awaken or acting confused (disoriented, slurred speech) and new-onset    Negative: Weakness of the face, arm, or leg on one side of the body and new-onset    Negative: Numbness of the face, arm, or leg on one side of the body and new-onset    Negative: Loss of speech or garbled speech and new-onset    Negative: Difficulty breathing and bluish (or gray) lips or face    Negative: Shock suspected (e.g., cold/pale/clammy skin, too weak to stand, low BP, rapid pulse)    Negative: Seeing or hearing or feeling things that are not there (i.e., auditory, visual, or tactile hallucinations)    Negative: Followed a head injury    Negative: Drug overdose suspected    Negative: Sounds like a life-threatening emergency to the triager    Negative: Questions or concerns about alcohol use, unhealthy alcohol use, binge drinking, intoxication, or withdrawal    Negative: Questions or concerns about substance use (drug use), unhealthy drug  use, intoxication, or withdrawal    Negative: Diabetes mellitus and confusion from low blood sugar (i.e., < 60 mg/dl or 3.5 mmol/l)    Protocols used: CONFUSION - DELIRIUM-A-OH, NEUROLOGIC DEFICIT-A-OH

## 2023-06-23 ENCOUNTER — OFFICE VISIT (OUTPATIENT)
Dept: OBGYN | Facility: CLINIC | Age: 43
End: 2023-06-23
Payer: COMMERCIAL

## 2023-06-23 ENCOUNTER — ANCILLARY PROCEDURE (OUTPATIENT)
Dept: MAMMOGRAPHY | Facility: CLINIC | Age: 43
End: 2023-06-23
Payer: COMMERCIAL

## 2023-06-23 VITALS
DIASTOLIC BLOOD PRESSURE: 67 MMHG | OXYGEN SATURATION: 100 % | WEIGHT: 159.2 LBS | SYSTOLIC BLOOD PRESSURE: 101 MMHG | HEART RATE: 78 BPM | BODY MASS INDEX: 28.2 KG/M2

## 2023-06-23 DIAGNOSIS — Z12.31 VISIT FOR SCREENING MAMMOGRAM: ICD-10-CM

## 2023-06-23 DIAGNOSIS — L30.9 ECZEMA, UNSPECIFIED TYPE: ICD-10-CM

## 2023-06-23 DIAGNOSIS — N93.9 ABNORMAL UTERINE BLEEDING: ICD-10-CM

## 2023-06-23 DIAGNOSIS — D25.1 INTRAMURAL, SUBMUCOUS, AND SUBSEROUS LEIOMYOMA OF UTERUS: ICD-10-CM

## 2023-06-23 DIAGNOSIS — D25.2 INTRAMURAL, SUBMUCOUS, AND SUBSEROUS LEIOMYOMA OF UTERUS: ICD-10-CM

## 2023-06-23 DIAGNOSIS — D25.0 INTRAMURAL, SUBMUCOUS, AND SUBSEROUS LEIOMYOMA OF UTERUS: ICD-10-CM

## 2023-06-23 DIAGNOSIS — Z01.411 ENCOUNTER FOR GYNECOLOGICAL EXAMINATION WITH ABNORMAL FINDING: Primary | ICD-10-CM

## 2023-06-23 DIAGNOSIS — L29.2 VULVAR ITCHING: ICD-10-CM

## 2023-06-23 DIAGNOSIS — Z32.00 PREGNANCY EXAMINATION OR TEST, PREGNANCY UNCONFIRMED: ICD-10-CM

## 2023-06-23 LAB — HCG UR QL: NEGATIVE

## 2023-06-23 PROCEDURE — 77067 SCR MAMMO BI INCL CAD: CPT | Mod: TC | Performed by: RADIOLOGY

## 2023-06-23 PROCEDURE — 99396 PREV VISIT EST AGE 40-64: CPT | Performed by: OBSTETRICS & GYNECOLOGY

## 2023-06-23 PROCEDURE — 81025 URINE PREGNANCY TEST: CPT | Performed by: OBSTETRICS & GYNECOLOGY

## 2023-06-23 RX ORDER — TRIAMCINOLONE ACETONIDE 1 MG/G
CREAM TOPICAL
Qty: 45 G | Refills: 3 | Status: SHIPPED | OUTPATIENT
Start: 2023-06-23 | End: 2024-09-13

## 2023-06-23 NOTE — PROGRESS NOTES
Ethel Murillo is a 42 year old female , who presents for annual exam.   No incontinence, or unusual discharge.   She fell and broke her shoulder in 4 places.  She did not have surgery because they were closed and healing together.  She did not have any steroids.    She then had some irregularility in her bleeding.  She had color change to dark brown, and late about 7 days for her last cycle.    She also had some hormonal acnes on her neck after the fracture.      2022 Ref Range & Units 6 mo ago   WHITE BLOOD COUNT         4.5 - 11.0 thou/cu mm 4.2 Low     RED BLOOD COUNT           4.00 - 5.20 mil/cu mm 4.01    HEMOGLOBIN                12.0 - 16.0 g/dL 12.2    HEMATOCRIT                33.0 - 51.0 % 36.8    MCV                       80 - 100 fL 92    MCH                       26.0 - 34.0 pg 30.4    MCHC                      32.0 - 36.0 g/dL 33.2    RDW                       11.5 - 15.5 % 12.3    PLATELET COUNT            140 - 440 thou/cu mm 236          Last cholesterol: Recent Labs   Lab Test 21  0820 19  0917   CHOL 206* 217*   HDL 55 59   * 137*   TRIG 70 107     Past Medical History:   Diagnosis Date     Cervical high risk HPV (human papillomavirus) test positive 2013, 2015    type 31, HR HPV     Eczema     of vulva     H/O colposcopy with cervical biopsy 2015    Bx & ECC - Negative     Infertility      LSIL (low grade squamous intraepithelial lesion) on Pap smear 2012    colp 2012 - ROSAURA 1/2     Uterine fibroid 2012       Past Surgical History:   Procedure Laterality Date     CRYOTHERAPY, CERVICAL  2012     LASER CO2 VULVA  2011    small condyloma       MYOMECTOMY UTERUS  8/3/2011    2.5 cm/ 4.5 cm/ 6.5 and 7.5 cm  surgery performed in [place].       MYOMECTOMY UTERUS  2019    30 uterine leiomyoma removed.       OB History    Para Term  AB Living   0 0 0 0 0 0   SAB IAB Ectopic Multiple Live Births   0 0 0 0 0       Gyn  History:  Gynecological History         Patient's last menstrual period was 06/07/2023 (exact date).      STD HPV/no PID/no IUD      history of abnormal pap smear:  Yes   Last pap:   Lab Results   Component Value Date    PAP NIL 12/17/2019           Current Outpatient Medications   Medication Sig Dispense Refill     triamcinolone (KENALOG) 0.1 % external cream 0.5 g to vulva (once a day) every other day. 45 g 3     vitamin D3 (CHOLECALCIFEROL) 2000 units tablet Take 1 tablet by mouth daily       Prenatal Vit-Fe Fumarate-FA (PRENATAL MULTIVITAMIN  PLUS IRON) 27-1 MG TABS Take by mouth daily (Patient not taking: Reported on 4/19/2022)         Allergies   Allergen Reactions     Guaifenesin Nausea       Social History     Socioeconomic History     Marital status:      Spouse name: Not on file     Number of children: 0     Years of education: Not on file     Highest education level: Not on file   Occupational History     Employer: UNEMPLOYED   Tobacco Use     Smoking status: Never     Smokeless tobacco: Never   Vaping Use     Vaping Use: Never used   Substance and Sexual Activity     Alcohol use: No     Drug use: No     Sexual activity: Yes     Partners: Male   Other Topics Concern     Parent/sibling w/ CABG, MI or angioplasty before 65F 55M? No   Social History Narrative     Not on file     Social Determinants of Health     Financial Resource Strain: Not on file   Food Insecurity: Not on file   Transportation Needs: Not on file   Physical Activity: Not on file   Stress: Not on file   Social Connections: Not on file   Intimate Partner Violence: Not on file   Housing Stability: Not on file       Family History   Problem Relation Age of Onset     Hypertension Mother      Asthma Mother      Ulcerative Colitis Mother      Hypertension Father          ROS:  All negative except as above.      EXAM:  /67 (BP Location: Right arm, Patient Position: Sitting, Cuff Size: Adult Regular)   Pulse 78   Wt 72.2 kg (159 lb  3.2 oz)   LMP 06/07/2023 (Exact Date)   SpO2 100%   BMI 28.20 kg/m    General:  WNWD female, NAD  Alert  Oriented x 3  Gait:  Normal  Skin:  Normal skin turgor  Neurologic:  CN grossly intact, good sensation.    HEENT:  NC/AT, EOMI  Neck:  No masses palpated, symmetrical, carotids +2/4, no bruits heard  Heart:  RRR  Lungs:  Clear   Breasts:  Symmetrical, no dimpling noted, no masses palpated, no discharge expressed  Abdomen:  Non-tender, non-distended.  Vulva: No external lesions, normal hair distribution, no adenopathy  BUS:  Normal, no masses noted  Urethra:  No hypermobility noted  Urethral meatus:  No masses noted  Vagina: Moist, pink, no abnormal discharge, well rugated, no lesions  Cervix: Smooth, pink, no visible lesions  Uterus: 12 to 14 week size, some-tenderness noted   Ovaries: No mass, non-tender, mobile  Perianal:  No masses noted.    Extremities:  No clubbing, cyanosis, or edema      ASSESSMENT/PLAN   Annual examination   Schedule ultrasound.  RTC after for further discussion regarding the ultrasound and the bleeding.   Low fat diet, weight bearing exercises and self breast exams on a monthly basis have been recommended.  I have discussed with patient the risks, benefits, medications, treatment options and modalities.   I have instructed the patient to call or schedule a follow-up appointment if any problems.

## 2023-06-23 NOTE — PATIENT INSTRUCTIONS
If you have any questions regarding your visit, Please contact your care team.    To Schedule an Appointment 24/7  Call: [phone]  [facility]  TELEPHONE NUMBER   Nico Cottrell M.D.    Martha-Medical Assistant    Alia Pollack-Surgery Scheduler  Ling-Surgery Scheduler Tuesday-[place]                   7:30 a.m.-3:30 p.m.  Wednesday-[place]             8:00 a.m.-4:30 p.m.  Thursday-[place]     8:00 a.m.-4:00 p.m.  Friday-[place]                       7:30 a.m.-1:00 p.m. [address] [place] [id number] [phone] Fax    Imaging Scheduling all locations  [phone]      [facility]  [address]  [place], [place] [address] [phone]    [facility] [place]  [address] [place] [address] [phone] ask for Women's Clinic     **Surgeries** Our Surgery Schedulers will contact you to schedule. If you do not receive a call within 3 business days, please call [phone].    Urgent Care locations:  [facility] Monday-Friday                 10 am - 8 pm  Saturday and Sunday        9 am - 5 pm   [phone]   If you need a medication refill, please contact your pharmacy. Please allow 3 business days for your refill to be completed.  As always, Thank you for trusting us with your healthcare needs!  If you have any questions regarding your visit, Please contact your care team.    [employer]: [phone]    To Schedule an Appointment 24/7  Call: [phone]    see additional instructions from your care team below

## 2023-06-27 ENCOUNTER — ANCILLARY PROCEDURE (OUTPATIENT)
Dept: ULTRASOUND IMAGING | Facility: CLINIC | Age: 43
End: 2023-06-27
Attending: OBSTETRICS & GYNECOLOGY
Payer: COMMERCIAL

## 2023-06-27 DIAGNOSIS — D25.1 INTRAMURAL, SUBMUCOUS, AND SUBSEROUS LEIOMYOMA OF UTERUS: ICD-10-CM

## 2023-06-27 DIAGNOSIS — D25.0 INTRAMURAL, SUBMUCOUS, AND SUBSEROUS LEIOMYOMA OF UTERUS: ICD-10-CM

## 2023-06-27 DIAGNOSIS — D25.2 INTRAMURAL, SUBMUCOUS, AND SUBSEROUS LEIOMYOMA OF UTERUS: ICD-10-CM

## 2023-06-27 DIAGNOSIS — N93.9 ABNORMAL UTERINE BLEEDING: ICD-10-CM

## 2023-06-27 PROCEDURE — 76830 TRANSVAGINAL US NON-OB: CPT | Mod: TC | Performed by: RADIOLOGY

## 2023-07-14 ENCOUNTER — OFFICE VISIT (OUTPATIENT)
Dept: OBGYN | Facility: CLINIC | Age: 43
End: 2023-07-14
Payer: COMMERCIAL

## 2023-07-14 VITALS
HEART RATE: 89 BPM | OXYGEN SATURATION: 95 % | BODY MASS INDEX: 27.99 KG/M2 | DIASTOLIC BLOOD PRESSURE: 65 MMHG | WEIGHT: 158 LBS | SYSTOLIC BLOOD PRESSURE: 98 MMHG

## 2023-07-14 DIAGNOSIS — D25.1 INTRAMURAL, SUBMUCOUS, AND SUBSEROUS LEIOMYOMA OF UTERUS: ICD-10-CM

## 2023-07-14 DIAGNOSIS — N88.2 CERVICAL OS STENOSIS: ICD-10-CM

## 2023-07-14 DIAGNOSIS — D25.0 INTRAMURAL, SUBMUCOUS, AND SUBSEROUS LEIOMYOMA OF UTERUS: ICD-10-CM

## 2023-07-14 DIAGNOSIS — D25.2 INTRAMURAL, SUBMUCOUS, AND SUBSEROUS LEIOMYOMA OF UTERUS: ICD-10-CM

## 2023-07-14 DIAGNOSIS — N93.9 ABNORMAL UTERINE BLEEDING: Primary | ICD-10-CM

## 2023-07-14 PROCEDURE — 58100 BIOPSY OF UTERUS LINING: CPT | Mod: 52 | Performed by: OBSTETRICS & GYNECOLOGY

## 2023-07-14 PROCEDURE — 99213 OFFICE O/P EST LOW 20 MIN: CPT | Mod: 25 | Performed by: OBSTETRICS & GYNECOLOGY

## 2023-07-14 RX ORDER — FERROUS GLUCONATE 324(38)MG
324 TABLET ORAL
Qty: 90 TABLET | Refills: 3 | Status: SHIPPED | OUTPATIENT
Start: 2023-07-14 | End: 2024-05-10

## 2023-07-14 NOTE — PROGRESS NOTES
Agata Murillo is a 42 year old female,   she has had irregularity in bleeding.  The color of the blood has also changed to a dark brown.  The menses last 7 days.  She initially thought that the uterine fibroids has returned.  She has a history of uterine leiomyoma and a history of myomectomy.  At that time she had adhesions and a 20 week size uterus.  She had desired to conserve the uterus as she had desired to have a pregnancy.      She had the ultrasound due to the bleeding and the history of fibroids.    The ultrasound was ordered and the following results are reviewed with her.      US TRANSVAGINAL PELVIC NON-OB 2023 11:18 AM  CLINICAL HISTORY: Intramural, submucous, and subserous leiomyoma of  uterus; Intramural, submucous, and subserous leiomyoma of uterus;  Intramural, submucous, and subserous leiomyoma of uterus; Abnormal  uterine bleeding  TECHNIQUE: Endovaginal ultrasound was performed to better visualize  the adnexa.  COMPARISON: Pelvis ultrasound 2019  FINDINGS:  UTERUS: 14.5 x 10.7 x 8.9 cm. Enlarged and containing multiple fibroids. Representative fibroids measure 6.7 x 6.3 x 3.3 cm, 7.0 x 5.4 x 4.5 cm, and 7.2 x 5.2 x 4.1 cm.  ENDOMETRIUM: The endometrium is not well visualized due to presence of fibroids.  RIGHT OVARY: 2.1 x 1.9 x 2.8 cm. Normal with flow demonstrated.  LEFT OVARY: Obscured by bowel gas.  No significant free fluid.                                                             IMPRESSION:  1.  Enlarged myomatous uterus.     The patient's medical history, social history and family history are reviewed and updated as needed.     Review of Systems:  10 point ROS of systems including Constitutional, Eyes, Respiratory, Cardiovascular, Gastroenterology, Genitourinary, Integumentary, Muscularskeletal, Psychiatric were all negative except for pertinent positives noted in my HPI and in the PMH.    Exam  BP 98/65 (BP Location: Right arm, Cuff Size: Adult Regular)   Pulse 89   Wt  71.7 kg (158 lb)   LMP 07/01/2023   SpO2 95%   BMI 27.99 kg/m    General:  WNWD female, NAD  Alert  Oriented x 3  Gait:  Normal  Skin:  Normal skin turgor  HEENT:  NC/AT, EOMI  Abdomen:  Non-tender, non-distended.  Vulva: No external lesions, normal hair distribution, no adenopathy  BUS:  Normal, no masses noted  Urethra:  No hypermobility seen  Urethral meatus:  No masses noted  Vagina: Moist, pink, no abnormal discharge, well rugated, no lesions  Cervix: Smooth, pink, no visible lesions, cervical stenosis is noted.   Uterus: enlarged, 16 to 18 weeks size  Ovaries: No mass, non-tender, mobile  Perianal:  No masses noted  Extremities:  No clubbing, no cyanosis and no edema.    Assessment  Abnormal uterine bleeding   Uterine leiomyoma     Plan  The EMB is advised, but as noted below, the EMB is not able to be performed.    Unable to obtain the EMB.  She will do the D&C.      Nico Cottrell MD        PROCEDURE NOTE:  The procedure was reviewed with patient.  After consenting to the procedure she was placed into the dorsal lithotomy position.  The examination was performed.  The speculum was placed into the vagina.  The cervix was prepped with Betadine.  The anterior lip of the cervix was grasped with the Allis tenaculum.  I was not able to obtain the endometrial biopsy due to the internal cervical os stenosis.    Instruments were removed.    Nico Cottrell MD

## 2023-07-25 ENCOUNTER — TELEPHONE (OUTPATIENT)
Dept: OBGYN | Facility: CLINIC | Age: 43
End: 2023-07-25
Payer: COMMERCIAL

## 2023-07-25 DIAGNOSIS — N88.2 CERVICAL OS STENOSIS: ICD-10-CM

## 2023-07-25 DIAGNOSIS — N93.9 ABNORMAL UTERINE BLEEDING: Primary | ICD-10-CM

## 2023-07-25 DIAGNOSIS — D25.1 INTRAMURAL, SUBMUCOUS, AND SUBSEROUS LEIOMYOMA OF UTERUS: ICD-10-CM

## 2023-07-25 DIAGNOSIS — D25.2 INTRAMURAL, SUBMUCOUS, AND SUBSEROUS LEIOMYOMA OF UTERUS: ICD-10-CM

## 2023-07-25 DIAGNOSIS — D25.0 INTRAMURAL, SUBMUCOUS, AND SUBSEROUS LEIOMYOMA OF UTERUS: ICD-10-CM

## 2023-07-26 NOTE — TELEPHONE ENCOUNTER
[facility] SURGERY PLANNING/SCHEDULING WORKSHEET                                                     Ethel Murillo                :  1980  MRN:  [id number]  Home Phone [phone]   Work Phone Not on file.   Mobile [phone]         Surgeon: Nico Cottrell MD    DIAGNOSIS:   abnormal uterine bleeding / uterine leiomyoma     SURGICAL PROCEDURE:  D&C with hysteroscopy     Surgery Location:  [facility]  Patient Surgery Class:  SDS  Length of Procedure:  30 minutes  Type of anesthesia:  MAC    Multi-surgeon case: no    OR Assistant needed:   No  Vendor needed: No  Positioning:  Lithotomy  Laterality:  NA  Date requested:   when available     Special Equipment: Jm Sure   Special Instructions for patient:  Per the [facility] Nurse when they call the patient prior to the surgery date.   Precautions:  NONE  :  NOT NEEDED    Sterilization consent:  Not applicable to procedure being performed.    Preop: Pre-op options: PCP  Pre-surgery consult needed:  Before the surgery day please schedule a 30 minute phone call  to discuss details of  the surgery.  Postop evaluation needed:  2 weeks    ALLERGIES:   Allergies   Allergen Reactions    Guaifenesin Nausea      BMI:There is no height or weight on file to calculate BMI.      The proposed surgical procedure is considered LOW risk.      Nico Cottrell MD    2023

## 2023-09-14 ENCOUNTER — TELEPHONE (OUTPATIENT)
Dept: OBGYN | Facility: CLINIC | Age: 43
End: 2023-09-14

## 2023-09-14 ENCOUNTER — PREP FOR PROCEDURE (OUTPATIENT)
Dept: OBGYN | Facility: CLINIC | Age: 43
End: 2023-09-14
Payer: COMMERCIAL

## 2023-09-14 DIAGNOSIS — N88.2 CERVICAL OS STENOSIS: ICD-10-CM

## 2023-09-14 DIAGNOSIS — D25.2 INTRAMURAL, SUBMUCOUS, AND SUBSEROUS LEIOMYOMA OF UTERUS: ICD-10-CM

## 2023-09-14 DIAGNOSIS — D25.0 INTRAMURAL, SUBMUCOUS, AND SUBSEROUS LEIOMYOMA OF UTERUS: ICD-10-CM

## 2023-09-14 DIAGNOSIS — N93.9 ABNORMAL UTERINE BLEEDING (AUB): ICD-10-CM

## 2023-09-14 DIAGNOSIS — D25.1 INTRAMURAL, SUBMUCOUS, AND SUBSEROUS LEIOMYOMA OF UTERUS: ICD-10-CM

## 2023-09-14 DIAGNOSIS — N93.9 ABNORMAL UTERINE BLEEDING: Primary | ICD-10-CM

## 2023-09-14 NOTE — TELEPHONE ENCOUNTER
Associated Diagnoses    Intramural, submucous, and subserous leiomyoma of uterus [D25.1, D25.0, D25.2]      Cervical os stenosis [N88.2]      Abnormal uterine bleeding (AUB) [N93.9]        Source Order Set    Order Set Name Order ID    [id number]     Case Request: Case Info    Panel 1    Providers    Provider Role Service   Nico Cottrell MD Primary      Procedures    Procedure Laterality Anesthesia Region   HYSTEROSCOPY, DIAGNOSTIC, WITH DILATION AND CURETTAGE OF UTERUS N/A MAC Uterus                  Requested date:   Location:  OR   Patient class:      Pre-op diagnoses: Intramural, submucous, and subserous leiomyoma of uterus    Cervical os stenosis    Abnormal uterine bleeding (AUB)     Scheduling Instructions    Additional Instructions for the Case    Surgical Assistant: Marian  Multi Surgeon Case No  CSC okay No  H&P:  Pre-op options: PCP  Post-op:  2 weeks  Sterilization consent:  Not applicable to procedure being performed.  Vendor: No  Surgical time needed: 30 Minutes  ERAS patient: NA  If scheduling for D&E does Laminaria placement: No     SURGERY SCHEDULING AND PRECERTIFICATION    Medical Record Number: [id number]  Ethel Murillo  YOB: 1980   Phone: [phone] (home)   Primary Provider: Nico Cottrell    Reason for Admit:  ICD-10 CODE:    Intramural, submucous, and subserous leiomyoma of uterus [D25.1, D25.0, D25.2]      Cervical os stenosis [N88.2]      Abnormal uterine bleeding (AUB) [N93.9]          Surgeon: Nico Cottrell MD  Surgical Procedure: HYSTEROSCOPY, DIAGNOSTIC, WITH DILATION AND CURETTAGE OF UTERUS    Date of Surgery 11/16 Time of Surgery Noon  Surgery to be performed at:  [facility]   Status: Outpatient  Type of Anesthesia Anticipated: MAC    Sterilization consent:  Not applicable to procedure being performed.    Pre-Op: On 10/27 with [facility]  COVID testing:  Per Provider's discretion Covid testing is not indicated.      Post-Op:  2 weeks on 12/1 with Dr Cottrell    Pre-certification routed to Financial Counselors:  Auto routes via Case Request    Surgery packet mailed to patient's home address: Yes  Patient instructed NPO 12 hours prior to surgery, arrive according to the time the nurse gives patient when called prior to surgery, must have a .  Patient understood and agrees to the plan.      Requestor:  Sylvia Maya     Location:  [place] [phone]

## 2023-10-04 ENCOUNTER — OFFICE VISIT (OUTPATIENT)
Dept: OBGYN | Facility: CLINIC | Age: 43
End: 2023-10-04
Payer: COMMERCIAL

## 2023-10-04 VITALS
WEIGHT: 159.6 LBS | OXYGEN SATURATION: 98 % | BODY MASS INDEX: 28.27 KG/M2 | SYSTOLIC BLOOD PRESSURE: 95 MMHG | DIASTOLIC BLOOD PRESSURE: 62 MMHG | HEART RATE: 76 BPM

## 2023-10-04 DIAGNOSIS — D25.1 INTRAMURAL, SUBMUCOUS, AND SUBSEROUS LEIOMYOMA OF UTERUS: ICD-10-CM

## 2023-10-04 DIAGNOSIS — D25.0 INTRAMURAL, SUBMUCOUS, AND SUBSEROUS LEIOMYOMA OF UTERUS: ICD-10-CM

## 2023-10-04 DIAGNOSIS — N88.2 CERVICAL OS STENOSIS: ICD-10-CM

## 2023-10-04 DIAGNOSIS — D25.2 INTRAMURAL, SUBMUCOUS, AND SUBSEROUS LEIOMYOMA OF UTERUS: ICD-10-CM

## 2023-10-04 DIAGNOSIS — N93.9 ABNORMAL UTERINE BLEEDING: Primary | ICD-10-CM

## 2023-10-04 PROCEDURE — 99214 OFFICE O/P EST MOD 30 MIN: CPT | Performed by: OBSTETRICS & GYNECOLOGY

## 2023-10-04 NOTE — PROGRESS NOTES
Ethel is a 42 year old  here for follow up of abnormal uterine bleeding.  She has been using acupuncture and chinese medication and she feels that is helping with her shoulder.  She does not want hysterectomy yet, as she wants to be able to recover better once her extremities are full mobile.    She states the bleeding is less and she uses about 1 to 2 pads a day.  She used to use more.      Past Medical History:   Diagnosis Date    Cervical high risk HPV (human papillomavirus) test positive 2013, 2015    type 31, HR HPV    Eczema     of vulva    H/O colposcopy with cervical biopsy 2015    Bx & ECC - Negative    Infertility     LSIL (low grade squamous intraepithelial lesion) on Pap smear 2012    colp 2012 - ROSAURA 1/2    Uterine fibroid 2012       Past Surgical History:   Procedure Laterality Date    CRYOTHERAPY, CERVICAL  2012    LASER CO2 VULVA  2011    small condyloma      MYOMECTOMY UTERUS  8/3/2011    2.5 cm/ 4.5 cm/ 6.5 and 7.5 cm  surgery performed in [place].      MYOMECTOMY UTERUS  2019    30 uterine leiomyoma removed.          Allergies   Allergen Reactions    Guaifenesin Nausea       Current Outpatient Medications   Medication Sig Dispense Refill    ferrous gluconate (FERGON) 324 (38 Fe) MG tablet Take 1 tablet (324 mg) by mouth daily (with breakfast) 90 tablet 3    Prenatal Vit-Fe Fumarate-FA (PRENATAL MULTIVITAMIN  PLUS IRON) 27-1 MG TABS Take by mouth daily (Patient not taking: Reported on 2022)      triamcinolone (KENALOG) 0.1 % external cream 0.5 g to vulva (once a day) every other day. 45 g 3    vitamin D3 (CHOLECALCIFEROL) 2000 units tablet Take 1 tablet by mouth daily         Social History     Socioeconomic History    Marital status:      Spouse name: Not on file    Number of children: 0    Years of education: Not on file    Highest education level: Not on file   Occupational History     Employer: UNEMPLOYED   Tobacco Use    Smoking status: Never     Smokeless tobacco: Never   Vaping Use    Vaping Use: Never used   Substance and Sexual Activity    Alcohol use: No    Drug use: No    Sexual activity: Yes     Partners: Male   Other Topics Concern    Parent/sibling w/ CABG, MI or angioplasty before 65F 55M? No   Social History Narrative    Not on file     Social Determinants of Health     Financial Resource Strain: Not on file   Food Insecurity: Not on file   Transportation Needs: Not on file   Physical Activity: Not on file   Stress: Not on file   Social Connections: Not on file   Interpersonal Safety: Not on file   Housing Stability: Not on file       Family History   Problem Relation Age of Onset    Hypertension Mother     Asthma Mother     Ulcerative Colitis Mother     Hypertension Father        Review of Systems:  10 point ROS of systems including Constitutional, Eyes, Respiratory, Cardiovascular, Gastroenterology, Genitourinary, Integumentary, Muscularskeletal, Psychiatric were all negative except for pertinent positives noted in my HPI and in the PMH.      Exam  BP 95/62 (BP Location: Right arm, Cuff Size: Adult Regular)   Pulse 76   Wt 72.4 kg (159 lb 9.6 oz)   LMP 09/26/2023 (Exact Date)   SpO2 98%   BMI 28.27 kg/m    General:  WNWD female, NAD  Alert  Oriented x 3  Gait:  Normal  Skin:  Normal skin turgor  HEENT:  NC/AT, EOMI  Lungs:  Good respiratory effort   Abdomen:  Non-tender, non-distended.  Pelvic exam:  not performed   Extremities:  No clubbing, no cyanosis and no edema.      Assessment  Abnormal uterine bleeding   Uterine leiomyoma   Cervical os stenosis       Plan  We discussed the hysteroscopy and D&C and the reasons for the procedure.  She wonders about waiting.  Abnormal bleeding may indicate endometrial abnormalities and it should be evaluated.  I discussed that the past evaluation 2017 is too old for use.  With the cervical os stenosis, the EMB in clinic is not able to be performed.  The hysteroscopy and D&C with Myosure is reviewed and  the procedure discussed.  The associated risks and benefits and goals and limitations are reviewed.  Questions seem to be answered.      Total time preparing to see patient with reviewing prior encounter and labs, face to face time,  and coordinating care on the same calendar date:  33 minutes    Nico Cottrell MD

## 2023-10-27 ENCOUNTER — OFFICE VISIT (OUTPATIENT)
Dept: FAMILY MEDICINE | Facility: CLINIC | Age: 43
End: 2023-10-27
Payer: COMMERCIAL

## 2023-10-27 VITALS
DIASTOLIC BLOOD PRESSURE: 66 MMHG | SYSTOLIC BLOOD PRESSURE: 104 MMHG | BODY MASS INDEX: 27.66 KG/M2 | HEART RATE: 71 BPM | WEIGHT: 162 LBS | OXYGEN SATURATION: 98 % | HEIGHT: 64 IN | RESPIRATION RATE: 18 BRPM

## 2023-10-27 DIAGNOSIS — N88.2 STRICTURE AND STENOSIS OF CERVIX: ICD-10-CM

## 2023-10-27 DIAGNOSIS — Z01.818 PREOP GENERAL PHYSICAL EXAM: Primary | ICD-10-CM

## 2023-10-27 DIAGNOSIS — D25.1 INTRAMURAL LEIOMYOMA OF UTERUS: ICD-10-CM

## 2023-10-27 DIAGNOSIS — N93.9 ABNORMAL VAGINAL BLEEDING: ICD-10-CM

## 2023-10-27 LAB
ANION GAP SERPL CALCULATED.3IONS-SCNC: 8 MMOL/L (ref 7–15)
BASOPHILS # BLD AUTO: 0 10E3/UL (ref 0–0.2)
BASOPHILS NFR BLD AUTO: 0 %
BUN SERPL-MCNC: 8.7 MG/DL (ref 6–20)
CALCIUM SERPL-MCNC: 8.9 MG/DL (ref 8.6–10)
CHLORIDE SERPL-SCNC: 105 MMOL/L (ref 98–107)
CREAT SERPL-MCNC: 0.68 MG/DL (ref 0.51–0.95)
DEPRECATED HCO3 PLAS-SCNC: 26 MMOL/L (ref 22–29)
EGFRCR SERPLBLD CKD-EPI 2021: >90 ML/MIN/1.73M2
EOSINOPHIL # BLD AUTO: 0.1 10E3/UL (ref 0–0.7)
EOSINOPHIL NFR BLD AUTO: 3 %
ERYTHROCYTE [DISTWIDTH] IN BLOOD BY AUTOMATED COUNT: 13.1 % (ref 10–15)
GLUCOSE SERPL-MCNC: 90 MG/DL (ref 70–99)
HCT VFR BLD AUTO: 37.9 % (ref 35–47)
HGB BLD-MCNC: 12.6 G/DL (ref 11.7–15.7)
IMM GRANULOCYTES # BLD: 0 10E3/UL
IMM GRANULOCYTES NFR BLD: 0 %
LYMPHOCYTES # BLD AUTO: 1 10E3/UL (ref 0.8–5.3)
LYMPHOCYTES NFR BLD AUTO: 31 %
MCH RBC QN AUTO: 30.5 PG (ref 26.5–33)
MCHC RBC AUTO-ENTMCNC: 33.2 G/DL (ref 31.5–36.5)
MCV RBC AUTO: 92 FL (ref 78–100)
MONOCYTES # BLD AUTO: 0.5 10E3/UL (ref 0–1.3)
MONOCYTES NFR BLD AUTO: 14 %
NEUTROPHILS # BLD AUTO: 1.7 10E3/UL (ref 1.6–8.3)
NEUTROPHILS NFR BLD AUTO: 52 %
PLATELET # BLD AUTO: 253 10E3/UL (ref 150–450)
POTASSIUM SERPL-SCNC: 4.6 MMOL/L (ref 3.4–5.3)
RBC # BLD AUTO: 4.13 10E6/UL (ref 3.8–5.2)
SODIUM SERPL-SCNC: 139 MMOL/L (ref 135–145)
WBC # BLD AUTO: 3.3 10E3/UL (ref 4–11)

## 2023-10-27 PROCEDURE — 36415 COLL VENOUS BLD VENIPUNCTURE: CPT | Performed by: FAMILY MEDICINE

## 2023-10-27 PROCEDURE — 85025 COMPLETE CBC W/AUTO DIFF WBC: CPT | Performed by: FAMILY MEDICINE

## 2023-10-27 PROCEDURE — 80048 BASIC METABOLIC PNL TOTAL CA: CPT | Performed by: FAMILY MEDICINE

## 2023-10-27 PROCEDURE — 99214 OFFICE O/P EST MOD 30 MIN: CPT | Performed by: FAMILY MEDICINE

## 2023-10-27 ASSESSMENT — PAIN SCALES - GENERAL: PAINLEVEL: NO PAIN (0)

## 2023-10-27 NOTE — PATIENT INSTRUCTIONS
Recommendations:    - No identified additional risk factors other than previously addressed    Antiplatelet or Anticoagulation Medication Instructions:   - Patient is on no antiplatelet or anticoagulation medications.    Additional Medication Instructions:  Patient is to take all scheduled medications on the day of surgery EXCEPT for modifications listed below:   - Herbal medications and vitamins: HOLD 14 days prior to surgery.    RECOMMENDATION:  APPROVAL GIVEN to proceed with proposed procedure, without further diagnostic evaluation.

## 2023-10-27 NOTE — PROGRESS NOTES
[facility]  [address]  [place] [address]  Phone: [phone]  Primary Provider: Nico Cottrell  Pre-op Performing Provider: GALINDO PAGE      PREOPERATIVE EVALUATION:  Today's date: 10/27/2023    Agata is a 42 year old female who presents for a preoperative evaluation.      10/27/2023     9:09 AM   Additional Questions   Roomed by Marj COSTA   Accompanied by self       Surgical Information:  Surgery/Procedure: HYSTEROSCOPY, DIAGNOSTIC, WITH DILATION AND CURETTAGE OF UTERUS   Surgery Location: [facility]   Surgeon: Ronit  Surgery Date: 11/16/2023  Time of Surgery: 12:00pm,  Where patient plans to recover: At home with family  Fax number for surgical facility: Note does not need to be faxed, will be available electronically in Epic.    Assessment & Plan     The proposed surgical procedure is considered LOW risk.    Preop general physical exam  No additional risks identified, perioperative medications discussed  - Basic metabolic panel  (Ca, Cl, CO2, Creat, Gluc, K, Na, BUN); Future  - CBC with platelets and differential; Future  - Basic metabolic panel  (Ca, Cl, CO2, Creat, Gluc, K, Na, BUN)  - CBC with platelets and differential    Intramural leiomyoma of uterus  Plan for hysteroscopy and diagnostic dilation and curettage    Stricture and stenosis of cervix    -Plan to follow-up    Abnormal vaginal bleeding   -Possibly from uterine fibroids     - No identified additional risk factors other than previously addressed    Antiplatelet or Anticoagulation Medication Instructions:   - Patient is on no antiplatelet or anticoagulation medications.    Additional Medication Instructions:  Patient is to take all scheduled medications on the day of surgery EXCEPT for modifications listed below:   - Herbal medications and vitamins: HOLD 14 days prior to surgery.    RECOMMENDATION:  APPROVAL GIVEN to proceed with proposed procedure, without further diagnostic  evaluation.    Subjective     LMP: 10/17/23      Migraine, palpitations, brain fog: chinese herbal medicine seems to help.    HPI related to upcoming procedure: Patient with uterine fibroids planned for hysteroscopy          10/27/2023     9:11 AM   Preop Questions   1. Have you ever had a heart attack or stroke? No   2. Have you ever had surgery on your heart or blood vessels, such as a stent placement, a coronary artery bypass, or surgery on an artery in your head, neck, heart, or legs? No   3. Do you have chest pain with activity? No   4. Do you have a history of  heart failure? No   5. Do you currently have a cold, bronchitis or symptoms of other infection? No   6. Do you have a cough, shortness of breath, or wheezing? No   7. Do you or anyone in your family have previous history of blood clots? YES - mother   8. Do you or does anyone in your family have a serious bleeding problem such as prolonged bleeding following surgeries or cuts? No   9. Have you ever had problems with anemia or been told to take iron pills? YES - self   10. Have you had any abnormal blood loss such as black, tarry or bloody stools, or abnormal vaginal bleeding? No   11. Have you ever had a blood transfusion? YES     11a. Have you ever had a transfusion reaction? No   12. Are you willing to have a blood transfusion if it is medically needed before, during, or after your surgery? Yes   13. Have you or any of your relatives ever had problems with anesthesia? No   14. Do you have sleep apnea, excessive snoring or daytime drowsiness? No   15. Do you have any artifical heart valves or other implanted medical devices like a pacemaker, defibrillator, or continuous glucose monitor? No   16. Do you have artificial joints? No   17. Are you allergic to latex? No   18. Is there any chance that you may be pregnant? No     Health Care Directive:  Patient does not have a Health Care Directive or Living Will: Discussed advance care planning with patient;  however, patient declined at this time.    Preoperative Review of :   reviewed - no record of controlled substances prescribed.      Status of Chronic Conditions:  See problem list for active medical problems. See ROS for pertinent symptoms related to these conditions.    Review of Systems  Constitutional, neuro, ENT, endocrine, pulmonary, cardiac, gastrointestinal, musculoskeletal, integument and psychiatric systems are negative, except as otherwise noted.    Patient Active Problem List    Diagnosis Date Noted    Iron deficiency anemia due to chronic blood loss 11/28/2017     Priority: High     Class: Chronic    Cervical os stenosis 09/14/2023     Priority: Medium    Abnormal uterine bleeding (AUB) 09/14/2023     Priority: Medium    Female infertility of other specified origin 10/20/2014     Priority: Medium     Subtle male factor (low sperm morphology)      CARDIOVASCULAR SCREENING; LDL GOAL LESS THAN 160 09/18/2013     Priority: Medium    Moderate dysplasia of cervix (ROSAURA II) 11/09/2012     Priority: Medium     11/9/12 LSIL. Plan colp  11/21/12 Colpo bx ROSAURA 1/2. Plan cryotherapy  12/12/12 cryotherapy performed. Repeat pap 3 months.    3/29/13 NIL. Repeat pap 3 months.  6/25/13 NIL. Repeat pap 6 months.   11/26/13 NIL, + HR HPV 31. Repeat cotest in 1 year.   1/7/15 NIL, + HR HPV. Plan colp  2/12/15 [place] Bx & ECC - Negative. Plan cotest in 1 year.  1/19/16 NIL, Neg HPV. Plan cotest in 1 year.   1/23/17 NIL pap/Neg HPV: plan: routine screening  12/17/19 NIL pap, Neg HPV.  Plan cotest in 3 years.   6/10/22 NIL pap, neg HPV. Cotest every 3 years      Fibroid uterus 11/09/2012     Priority: Medium    Dysmenorrhea 11/09/2012     Priority: Medium      Past Medical History:   Diagnosis Date    Cervical high risk HPV (human papillomavirus) test positive 11/2013, 1/2015    type 31, HR HPV    Eczema     of vulva    H/O colposcopy with cervical biopsy 2/2015    Bx & ECC - Negative    Infertility     LSIL (low grade  "squamous intraepithelial lesion) on Pap smear 11/2012    colp 11/2012 - ROSAURA 1/2    Uterine fibroid 11/2012     Past Surgical History:   Procedure Laterality Date    CRYOTHERAPY, CERVICAL  12/2012    LASER CO2 VULVA  12/2011    small condyloma      MYOMECTOMY UTERUS  8/3/2011    2.5 cm/ 4.5 cm/ 6.5 and 7.5 cm  surgery performed in [place].      MYOMECTOMY UTERUS  03/18/2019    30 uterine leiomyoma removed.     Current Outpatient Medications   Medication Sig Dispense Refill    ferrous gluconate (FERGON) 324 (38 Fe) MG tablet Take 1 tablet (324 mg) by mouth daily (with breakfast) 90 tablet 3    triamcinolone (KENALOG) 0.1 % external cream 0.5 g to vulva (once a day) every other day. 45 g 3    vitamin D3 (CHOLECALCIFEROL) 2000 units tablet Take 1 tablet by mouth daily         Allergies   Allergen Reactions    Guaifenesin Nausea        Social History     Tobacco Use    Smoking status: Never    Smokeless tobacco: Never   Substance Use Topics    Alcohol use: No     Family History   Problem Relation Age of Onset    Hypertension Mother     Asthma Mother     Ulcerative Colitis Mother     Hypertension Father      History   Drug Use No         Objective     /66 (BP Location: Left arm, Cuff Size: Adult Regular)   Pulse 71   Resp 18   Ht 1.63 m (5' 4.17\")   Wt 73.5 kg (162 lb)   LMP 10/17/2023 (Exact Date)   SpO2 98%   BMI 27.66 kg/m      Physical Exam    GENERAL APPEARANCE: healthy, alert and no distress     HENT: ear canals and TM's normal and nose and mouth without ulcers or lesions     NECK: no adenopathy and thyroid normal to palpation     RESP: lungs clear to auscultation - no rales, rhonchi or wheezes     CV: regular rates and rhythm, normal S1 S2, no S3 or S4 and no murmur, click or rub     ABDOMEN:  soft, nontender, no HSM or masses and bowel sounds normal     MS: extremities normal- no gross deformities noted.     SKIN: no suspicious lesions or rashes     NEURO: Normal strength and tone, mentation intact " "and speech normal     PSYCH: mentation appears normal. and affect normal/brigh    No results for input(s): \"HGB\", \"PLT\", \"INR\", \"NA\", \"POTASSIUM\", \"CR\", \"A1C\" in the last 86874 hours.     Diagnostics:  Results for orders placed or performed in visit on 10/27/23   Basic metabolic panel  (Ca, Cl, CO2, Creat, Gluc, K, Na, BUN)     Status: Normal   Result Value Ref Range    Sodium 139 135 - 145 mmol/L    Potassium 4.6 3.4 - 5.3 mmol/L    Chloride 105 98 - 107 mmol/L    Carbon Dioxide (CO2) 26 22 - 29 mmol/L    Anion Gap 8 7 - 15 mmol/L    Urea Nitrogen 8.7 6.0 - 20.0 mg/dL    Creatinine 0.68 0.51 - 0.95 mg/dL    GFR Estimate >90 >60 mL/min/1.73m2    Calcium 8.9 8.6 - 10.0 mg/dL    Glucose 90 70 - 99 mg/dL   CBC with platelets and differential     Status: Abnormal   Result Value Ref Range    WBC Count 3.3 (L) 4.0 - 11.0 10e3/uL    RBC Count 4.13 3.80 - 5.20 10e6/uL    Hemoglobin 12.6 11.7 - 15.7 g/dL    Hematocrit 37.9 35.0 - 47.0 %    MCV 92 78 - 100 fL    MCH 30.5 26.5 - 33.0 pg    MCHC 33.2 31.5 - 36.5 g/dL    RDW 13.1 10.0 - 15.0 %    Platelet Count 253 150 - 450 10e3/uL    % Neutrophils 52 %    % Lymphocytes 31 %    % Monocytes 14 %    % Eosinophils 3 %    % Basophils 0 %    % Immature Granulocytes 0 %    Absolute Neutrophils 1.7 1.6 - 8.3 10e3/uL    Absolute Lymphocytes 1.0 0.8 - 5.3 10e3/uL    Absolute Monocytes 0.5 0.0 - 1.3 10e3/uL    Absolute Eosinophils 0.1 0.0 - 0.7 10e3/uL    Absolute Basophils 0.0 0.0 - 0.2 10e3/uL    Absolute Immature Granulocytes 0.0 <=0.4 10e3/uL   CBC with platelets and differential     Status: Abnormal    Narrative    The following orders were created for panel order CBC with platelets and differential.  Procedure                               Abnormality         Status                     ---------                               -----------         ------                     CBC with platelets and d...[[id number]]  Abnormal            Final result                 Please view results for " these tests on the individual orders.     Revised Cardiac Risk Index (RCRI):  The patient has the following serious cardiovascular risks for perioperative complications:   - No serious cardiac risks = 0 points     RCRI Interpretation: 0 points: Class I (very low risk - 0.4% complication rate)         Signed Electronically by: Israel Ramirez MD  Copy of this evaluation report is provided to requesting physician.

## 2023-10-27 NOTE — H&P (VIEW-ONLY)
[facility]  [address]  [place] [address]  Phone: [phone]  Primary Provider: Nico Cottrell  Pre-op Performing Provider: GALINDO PAGE      PREOPERATIVE EVALUATION:  Today's date: 10/27/2023    Agata is a 42 year old female who presents for a preoperative evaluation.      10/27/2023     9:09 AM   Additional Questions   Roomed by Marj COSTA   Accompanied by self       Surgical Information:  Surgery/Procedure: HYSTEROSCOPY, DIAGNOSTIC, WITH DILATION AND CURETTAGE OF UTERUS   Surgery Location: [facility]   Surgeon: Ronit  Surgery Date: 11/16/2023  Time of Surgery: 12:00pm,  Where patient plans to recover: At home with family  Fax number for surgical facility: Note does not need to be faxed, will be available electronically in Epic.    Assessment & Plan     The proposed surgical procedure is considered LOW risk.    Preop general physical exam  No additional risks identified, perioperative medications discussed  - Basic metabolic panel  (Ca, Cl, CO2, Creat, Gluc, K, Na, BUN); Future  - CBC with platelets and differential; Future  - Basic metabolic panel  (Ca, Cl, CO2, Creat, Gluc, K, Na, BUN)  - CBC with platelets and differential    Intramural leiomyoma of uterus  Plan for hysteroscopy and diagnostic dilation and curettage    Stricture and stenosis of cervix    -Plan to follow-up    Abnormal vaginal bleeding   -Possibly from uterine fibroids     - No identified additional risk factors other than previously addressed    Antiplatelet or Anticoagulation Medication Instructions:   - Patient is on no antiplatelet or anticoagulation medications.    Additional Medication Instructions:  Patient is to take all scheduled medications on the day of surgery EXCEPT for modifications listed below:   - Herbal medications and vitamins: HOLD 14 days prior to surgery.    RECOMMENDATION:  APPROVAL GIVEN to proceed with proposed procedure, without further diagnostic  evaluation.    Subjective     LMP: 10/17/23      Migraine, palpitations, brain fog: chinese herbal medicine seems to help.    HPI related to upcoming procedure: Patient with uterine fibroids planned for hysteroscopy          10/27/2023     9:11 AM   Preop Questions   1. Have you ever had a heart attack or stroke? No   2. Have you ever had surgery on your heart or blood vessels, such as a stent placement, a coronary artery bypass, or surgery on an artery in your head, neck, heart, or legs? No   3. Do you have chest pain with activity? No   4. Do you have a history of  heart failure? No   5. Do you currently have a cold, bronchitis or symptoms of other infection? No   6. Do you have a cough, shortness of breath, or wheezing? No   7. Do you or anyone in your family have previous history of blood clots? YES - mother   8. Do you or does anyone in your family have a serious bleeding problem such as prolonged bleeding following surgeries or cuts? No   9. Have you ever had problems with anemia or been told to take iron pills? YES - self   10. Have you had any abnormal blood loss such as black, tarry or bloody stools, or abnormal vaginal bleeding? No   11. Have you ever had a blood transfusion? YES     11a. Have you ever had a transfusion reaction? No   12. Are you willing to have a blood transfusion if it is medically needed before, during, or after your surgery? Yes   13. Have you or any of your relatives ever had problems with anesthesia? No   14. Do you have sleep apnea, excessive snoring or daytime drowsiness? No   15. Do you have any artifical heart valves or other implanted medical devices like a pacemaker, defibrillator, or continuous glucose monitor? No   16. Do you have artificial joints? No   17. Are you allergic to latex? No   18. Is there any chance that you may be pregnant? No     Health Care Directive:  Patient does not have a Health Care Directive or Living Will: Discussed advance care planning with patient;  however, patient declined at this time.    Preoperative Review of :   reviewed - no record of controlled substances prescribed.      Status of Chronic Conditions:  See problem list for active medical problems. See ROS for pertinent symptoms related to these conditions.    Review of Systems  Constitutional, neuro, ENT, endocrine, pulmonary, cardiac, gastrointestinal, musculoskeletal, integument and psychiatric systems are negative, except as otherwise noted.    Patient Active Problem List    Diagnosis Date Noted    Iron deficiency anemia due to chronic blood loss 11/28/2017     Priority: High     Class: Chronic    Cervical os stenosis 09/14/2023     Priority: Medium    Abnormal uterine bleeding (AUB) 09/14/2023     Priority: Medium    Female infertility of other specified origin 10/20/2014     Priority: Medium     Subtle male factor (low sperm morphology)      CARDIOVASCULAR SCREENING; LDL GOAL LESS THAN 160 09/18/2013     Priority: Medium    Moderate dysplasia of cervix (ROSAURA II) 11/09/2012     Priority: Medium     11/9/12 LSIL. Plan colp  11/21/12 Colpo bx ROSAURA 1/2. Plan cryotherapy  12/12/12 cryotherapy performed. Repeat pap 3 months.    3/29/13 NIL. Repeat pap 3 months.  6/25/13 NIL. Repeat pap 6 months.   11/26/13 NIL, + HR HPV 31. Repeat cotest in 1 year.   1/7/15 NIL, + HR HPV. Plan colp  2/12/15 [place] Bx & ECC - Negative. Plan cotest in 1 year.  1/19/16 NIL, Neg HPV. Plan cotest in 1 year.   1/23/17 NIL pap/Neg HPV: plan: routine screening  12/17/19 NIL pap, Neg HPV.  Plan cotest in 3 years.   6/10/22 NIL pap, neg HPV. Cotest every 3 years      Fibroid uterus 11/09/2012     Priority: Medium    Dysmenorrhea 11/09/2012     Priority: Medium      Past Medical History:   Diagnosis Date    Cervical high risk HPV (human papillomavirus) test positive 11/2013, 1/2015    type 31, HR HPV    Eczema     of vulva    H/O colposcopy with cervical biopsy 2/2015    Bx & ECC - Negative    Infertility     LSIL (low grade  "squamous intraepithelial lesion) on Pap smear 11/2012    colp 11/2012 - ROSAURA 1/2    Uterine fibroid 11/2012     Past Surgical History:   Procedure Laterality Date    CRYOTHERAPY, CERVICAL  12/2012    LASER CO2 VULVA  12/2011    small condyloma      MYOMECTOMY UTERUS  8/3/2011    2.5 cm/ 4.5 cm/ 6.5 and 7.5 cm  surgery performed in [place].      MYOMECTOMY UTERUS  03/18/2019    30 uterine leiomyoma removed.     Current Outpatient Medications   Medication Sig Dispense Refill    ferrous gluconate (FERGON) 324 (38 Fe) MG tablet Take 1 tablet (324 mg) by mouth daily (with breakfast) 90 tablet 3    triamcinolone (KENALOG) 0.1 % external cream 0.5 g to vulva (once a day) every other day. 45 g 3    vitamin D3 (CHOLECALCIFEROL) 2000 units tablet Take 1 tablet by mouth daily         Allergies   Allergen Reactions    Guaifenesin Nausea        Social History     Tobacco Use    Smoking status: Never    Smokeless tobacco: Never   Substance Use Topics    Alcohol use: No     Family History   Problem Relation Age of Onset    Hypertension Mother     Asthma Mother     Ulcerative Colitis Mother     Hypertension Father      History   Drug Use No         Objective     /66 (BP Location: Left arm, Cuff Size: Adult Regular)   Pulse 71   Resp 18   Ht 1.63 m (5' 4.17\")   Wt 73.5 kg (162 lb)   LMP 10/17/2023 (Exact Date)   SpO2 98%   BMI 27.66 kg/m      Physical Exam    GENERAL APPEARANCE: healthy, alert and no distress     HENT: ear canals and TM's normal and nose and mouth without ulcers or lesions     NECK: no adenopathy and thyroid normal to palpation     RESP: lungs clear to auscultation - no rales, rhonchi or wheezes     CV: regular rates and rhythm, normal S1 S2, no S3 or S4 and no murmur, click or rub     ABDOMEN:  soft, nontender, no HSM or masses and bowel sounds normal     MS: extremities normal- no gross deformities noted.     SKIN: no suspicious lesions or rashes     NEURO: Normal strength and tone, mentation intact " "and speech normal     PSYCH: mentation appears normal. and affect normal/brigh    No results for input(s): \"HGB\", \"PLT\", \"INR\", \"NA\", \"POTASSIUM\", \"CR\", \"A1C\" in the last 56966 hours.     Diagnostics:  Results for orders placed or performed in visit on 10/27/23   Basic metabolic panel  (Ca, Cl, CO2, Creat, Gluc, K, Na, BUN)     Status: Normal   Result Value Ref Range    Sodium 139 135 - 145 mmol/L    Potassium 4.6 3.4 - 5.3 mmol/L    Chloride 105 98 - 107 mmol/L    Carbon Dioxide (CO2) 26 22 - 29 mmol/L    Anion Gap 8 7 - 15 mmol/L    Urea Nitrogen 8.7 6.0 - 20.0 mg/dL    Creatinine 0.68 0.51 - 0.95 mg/dL    GFR Estimate >90 >60 mL/min/1.73m2    Calcium 8.9 8.6 - 10.0 mg/dL    Glucose 90 70 - 99 mg/dL   CBC with platelets and differential     Status: Abnormal   Result Value Ref Range    WBC Count 3.3 (L) 4.0 - 11.0 10e3/uL    RBC Count 4.13 3.80 - 5.20 10e6/uL    Hemoglobin 12.6 11.7 - 15.7 g/dL    Hematocrit 37.9 35.0 - 47.0 %    MCV 92 78 - 100 fL    MCH 30.5 26.5 - 33.0 pg    MCHC 33.2 31.5 - 36.5 g/dL    RDW 13.1 10.0 - 15.0 %    Platelet Count 253 150 - 450 10e3/uL    % Neutrophils 52 %    % Lymphocytes 31 %    % Monocytes 14 %    % Eosinophils 3 %    % Basophils 0 %    % Immature Granulocytes 0 %    Absolute Neutrophils 1.7 1.6 - 8.3 10e3/uL    Absolute Lymphocytes 1.0 0.8 - 5.3 10e3/uL    Absolute Monocytes 0.5 0.0 - 1.3 10e3/uL    Absolute Eosinophils 0.1 0.0 - 0.7 10e3/uL    Absolute Basophils 0.0 0.0 - 0.2 10e3/uL    Absolute Immature Granulocytes 0.0 <=0.4 10e3/uL   CBC with platelets and differential     Status: Abnormal    Narrative    The following orders were created for panel order CBC with platelets and differential.  Procedure                               Abnormality         Status                     ---------                               -----------         ------                     CBC with platelets and d...[[id number]]  Abnormal            Final result                 Please view results for " these tests on the individual orders.     Revised Cardiac Risk Index (RCRI):  The patient has the following serious cardiovascular risks for perioperative complications:   - No serious cardiac risks = 0 points     RCRI Interpretation: 0 points: Class I (very low risk - 0.4% complication rate)         Signed Electronically by: Israel Ramirez MD  Copy of this evaluation report is provided to requesting physician.       Self

## 2023-11-13 ENCOUNTER — ANESTHESIA EVENT (OUTPATIENT)
Dept: SURGERY | Facility: AMBULATORY SURGERY CENTER | Age: 43
End: 2023-11-13
Payer: COMMERCIAL

## 2023-11-16 ENCOUNTER — HOSPITAL ENCOUNTER (OUTPATIENT)
Facility: AMBULATORY SURGERY CENTER | Age: 43
Discharge: HOME | End: 2023-11-16
Attending: OBSTETRICS & GYNECOLOGY | Admitting: OBSTETRICS & GYNECOLOGY
Payer: COMMERCIAL

## 2023-11-16 ENCOUNTER — ANESTHESIA (OUTPATIENT)
Dept: SURGERY | Facility: AMBULATORY SURGERY CENTER | Age: 43
End: 2023-11-16
Payer: COMMERCIAL

## 2023-11-16 VITALS
OXYGEN SATURATION: 100 % | TEMPERATURE: 97.9 F | RESPIRATION RATE: 16 BRPM | WEIGHT: 162 LBS | DIASTOLIC BLOOD PRESSURE: 78 MMHG | BODY MASS INDEX: 27.66 KG/M2 | SYSTOLIC BLOOD PRESSURE: 136 MMHG | HEART RATE: 62 BPM

## 2023-11-16 DIAGNOSIS — G89.18 POST-OP PAIN: Primary | ICD-10-CM

## 2023-11-16 PROCEDURE — 58558 HYSTEROSCOPY BIOPSY: CPT

## 2023-11-16 PROCEDURE — G8907 PT DOC NO EVENTS ON DISCHARG: HCPCS

## 2023-11-16 PROCEDURE — G8918 PT W/O PREOP ORDER IV AB PRO: HCPCS

## 2023-11-16 PROCEDURE — 88305 TISSUE EXAM BY PATHOLOGIST: CPT | Performed by: PATHOLOGY

## 2023-11-16 RX ORDER — ONDANSETRON 2 MG/ML
4 INJECTION INTRAMUSCULAR; INTRAVENOUS EVERY 30 MIN PRN
Status: DISCONTINUED | OUTPATIENT
Start: 2023-11-16 | End: 2023-11-17 | Stop reason: HOSPADM

## 2023-11-16 RX ORDER — DIMENHYDRINATE 50 MG/ML
25 INJECTION, SOLUTION INTRAMUSCULAR; INTRAVENOUS
Status: DISCONTINUED | OUTPATIENT
Start: 2023-11-16 | End: 2023-11-17 | Stop reason: HOSPADM

## 2023-11-16 RX ORDER — HALOPERIDOL 5 MG/ML
1 INJECTION INTRAMUSCULAR
Status: DISCONTINUED | OUTPATIENT
Start: 2023-11-16 | End: 2023-11-17 | Stop reason: HOSPADM

## 2023-11-16 RX ORDER — FENTANYL CITRATE 50 UG/ML
50 INJECTION, SOLUTION INTRAMUSCULAR; INTRAVENOUS EVERY 5 MIN PRN
Status: DISCONTINUED | OUTPATIENT
Start: 2023-11-16 | End: 2023-11-17 | Stop reason: HOSPADM

## 2023-11-16 RX ORDER — ONDANSETRON 4 MG/1
4 TABLET, ORALLY DISINTEGRATING ORAL EVERY 30 MIN PRN
Status: DISCONTINUED | OUTPATIENT
Start: 2023-11-16 | End: 2023-11-17 | Stop reason: HOSPADM

## 2023-11-16 RX ORDER — MEPERIDINE HYDROCHLORIDE 25 MG/ML
12.5 INJECTION INTRAMUSCULAR; INTRAVENOUS; SUBCUTANEOUS EVERY 5 MIN PRN
Status: DISCONTINUED | OUTPATIENT
Start: 2023-11-16 | End: 2023-11-17 | Stop reason: HOSPADM

## 2023-11-16 RX ORDER — FENTANYL CITRATE 50 UG/ML
INJECTION, SOLUTION INTRAMUSCULAR; INTRAVENOUS PRN
Status: DISCONTINUED | OUTPATIENT
Start: 2023-11-16 | End: 2023-11-16

## 2023-11-16 RX ORDER — SODIUM CHLORIDE, SODIUM LACTATE, POTASSIUM CHLORIDE, CALCIUM CHLORIDE 600; 310; 30; 20 MG/100ML; MG/100ML; MG/100ML; MG/100ML
INJECTION, SOLUTION INTRAVENOUS CONTINUOUS
Status: DISCONTINUED | OUTPATIENT
Start: 2023-11-16 | End: 2023-11-17 | Stop reason: HOSPADM

## 2023-11-16 RX ORDER — ACETAMINOPHEN 325 MG/1
975 TABLET ORAL ONCE
Status: DISCONTINUED | OUTPATIENT
Start: 2023-11-16 | End: 2023-11-17 | Stop reason: HOSPADM

## 2023-11-16 RX ORDER — FENTANYL CITRATE 50 UG/ML
25 INJECTION, SOLUTION INTRAMUSCULAR; INTRAVENOUS EVERY 5 MIN PRN
Status: DISCONTINUED | OUTPATIENT
Start: 2023-11-16 | End: 2023-11-17 | Stop reason: HOSPADM

## 2023-11-16 RX ORDER — OXYCODONE HYDROCHLORIDE 5 MG/1
10 TABLET ORAL
Status: DISCONTINUED | OUTPATIENT
Start: 2023-11-16 | End: 2023-11-17 | Stop reason: HOSPADM

## 2023-11-16 RX ORDER — FENTANYL CITRATE 50 UG/ML
25 INJECTION, SOLUTION INTRAMUSCULAR; INTRAVENOUS
Status: DISCONTINUED | OUTPATIENT
Start: 2023-11-16 | End: 2023-11-17 | Stop reason: HOSPADM

## 2023-11-16 RX ORDER — KETOROLAC TROMETHAMINE 30 MG/ML
15 INJECTION, SOLUTION INTRAMUSCULAR; INTRAVENOUS
Status: COMPLETED | OUTPATIENT
Start: 2023-11-16 | End: 2023-11-16

## 2023-11-16 RX ORDER — LABETALOL HYDROCHLORIDE 5 MG/ML
10 INJECTION, SOLUTION INTRAVENOUS
Status: DISCONTINUED | OUTPATIENT
Start: 2023-11-16 | End: 2023-11-17 | Stop reason: HOSPADM

## 2023-11-16 RX ORDER — ALBUTEROL SULFATE 0.83 MG/ML
2.5 SOLUTION RESPIRATORY (INHALATION) EVERY 4 HOURS PRN
Status: DISCONTINUED | OUTPATIENT
Start: 2023-11-16 | End: 2023-11-17 | Stop reason: HOSPADM

## 2023-11-16 RX ORDER — HYDROXYZINE HYDROCHLORIDE 25 MG/1
25 TABLET, FILM COATED ORAL EVERY 6 HOURS PRN
Status: DISCONTINUED | OUTPATIENT
Start: 2023-11-16 | End: 2023-11-17 | Stop reason: HOSPADM

## 2023-11-16 RX ORDER — DEXAMETHASONE SODIUM PHOSPHATE 4 MG/ML
4 INJECTION, SOLUTION INTRA-ARTICULAR; INTRALESIONAL; INTRAMUSCULAR; INTRAVENOUS; SOFT TISSUE
Status: DISCONTINUED | OUTPATIENT
Start: 2023-11-16 | End: 2023-11-17 | Stop reason: HOSPADM

## 2023-11-16 RX ORDER — ACETAMINOPHEN 325 MG/1
975 TABLET ORAL EVERY 6 HOURS PRN
Qty: 50 TABLET | Refills: 0 | Status: SHIPPED | OUTPATIENT
Start: 2023-11-16 | End: 2024-05-10

## 2023-11-16 RX ORDER — LIDOCAINE HYDROCHLORIDE 20 MG/ML
INJECTION, SOLUTION INFILTRATION; PERINEURAL PRN
Status: DISCONTINUED | OUTPATIENT
Start: 2023-11-16 | End: 2023-11-16

## 2023-11-16 RX ORDER — LORAZEPAM 2 MG/ML
.5-1 INJECTION INTRAMUSCULAR
Status: DISCONTINUED | OUTPATIENT
Start: 2023-11-16 | End: 2023-11-17 | Stop reason: HOSPADM

## 2023-11-16 RX ORDER — ACETAMINOPHEN 325 MG/1
975 TABLET ORAL ONCE
Status: COMPLETED | OUTPATIENT
Start: 2023-11-16 | End: 2023-11-16

## 2023-11-16 RX ORDER — LIDOCAINE 40 MG/G
CREAM TOPICAL
Status: DISCONTINUED | OUTPATIENT
Start: 2023-11-16 | End: 2023-11-17 | Stop reason: HOSPADM

## 2023-11-16 RX ORDER — PROPOFOL 10 MG/ML
INJECTION, EMULSION INTRAVENOUS CONTINUOUS PRN
Status: DISCONTINUED | OUTPATIENT
Start: 2023-11-16 | End: 2023-11-16

## 2023-11-16 RX ORDER — OXYCODONE HYDROCHLORIDE 5 MG/1
5 TABLET ORAL
Status: DISCONTINUED | OUTPATIENT
Start: 2023-11-16 | End: 2023-11-17 | Stop reason: HOSPADM

## 2023-11-16 RX ORDER — IBUPROFEN 400 MG/1
800 TABLET, FILM COATED ORAL ONCE
Status: DISCONTINUED | OUTPATIENT
Start: 2023-11-16 | End: 2023-11-17 | Stop reason: HOSPADM

## 2023-11-16 RX ORDER — HYDRALAZINE HYDROCHLORIDE 20 MG/ML
2.5-5 INJECTION INTRAMUSCULAR; INTRAVENOUS EVERY 10 MIN PRN
Status: DISCONTINUED | OUTPATIENT
Start: 2023-11-16 | End: 2023-11-17 | Stop reason: HOSPADM

## 2023-11-16 RX ORDER — IBUPROFEN 800 MG/1
800 TABLET, FILM COATED ORAL EVERY 6 HOURS PRN
Qty: 30 TABLET | Refills: 0 | Status: SHIPPED | OUTPATIENT
Start: 2023-11-16 | End: 2024-05-10

## 2023-11-16 RX ADMIN — FENTANYL CITRATE 25 MCG: 50 INJECTION, SOLUTION INTRAMUSCULAR; INTRAVENOUS at 12:22

## 2023-11-16 RX ADMIN — FENTANYL CITRATE 25 MCG: 50 INJECTION, SOLUTION INTRAMUSCULAR; INTRAVENOUS at 12:15

## 2023-11-16 RX ADMIN — ACETAMINOPHEN 975 MG: 325 TABLET ORAL at 11:04

## 2023-11-16 RX ADMIN — KETOROLAC TROMETHAMINE 15 MG: 30 INJECTION, SOLUTION INTRAMUSCULAR; INTRAVENOUS at 13:15

## 2023-11-16 RX ADMIN — SODIUM CHLORIDE, SODIUM LACTATE, POTASSIUM CHLORIDE, CALCIUM CHLORIDE: 600; 310; 30; 20 INJECTION, SOLUTION INTRAVENOUS at 12:09

## 2023-11-16 RX ADMIN — PROPOFOL 50 MG: 10 INJECTION, EMULSION INTRAVENOUS at 12:12

## 2023-11-16 RX ADMIN — LIDOCAINE HYDROCHLORIDE 80 MG: 20 INJECTION, SOLUTION INFILTRATION; PERINEURAL at 12:10

## 2023-11-16 RX ADMIN — PROPOFOL 125 MCG/KG/MIN: 10 INJECTION, EMULSION INTRAVENOUS at 12:10

## 2023-11-16 NOTE — OP NOTE
OPERATIVE REPORT:    Date of Procedure:  11/16/2023    Preoperative Diagnosis:  Abnormal uterine bleeding  Uterine leiomyoma   Cervical os stenosis      Postoperative Diagnosis:  Abnormal uterine bleeding   Uterine leiomyoma   Cervical os stenosis     Procedures:  Hysteroscopy and dilation and curettage with MyoSure     Surgeon:  Nico Cottrell MD    OR personnel:   See OR record     Anesthesia:  Monitor Anesthesia Care    Specimens:    Endometrial tissue     Complications:   None apparent     Findings/Conclusions:   Stenotic os, responsive to the cervical os finder   Thickened endometrium, but no focal lesions seen    Estimated Blood Loss:  20 ml     Fluid Deficit:    See OR record     Condition on discharge from OR:  Satisfactory      Procedure in Detail:  Proper consents were obtained.  The patient and I reviewed the risks, benefits, goals and limitations. She is aware the fibroid might not be able to be removed hysteroscopically and there is a reoccurrence risk.   Her questions seemed to be answered.  After consenting to the procedure, the patient was taken to the OR where she was placed into the supine position.  She was then placed under MAC anesthesia after which she was placed into the dorsal lithotomy position.  She was then prepped and draped into the usual sterile fashion.  I then performed the exam under anesthesia.  The speculum was placed and the cervix was grasped with the single tooth tenaculum.  The cervix was probed with the Cervical os finder.  The uterus was sounded to about 7 cm.   The cervix was then dilated to a 6 Vivi.  The hysteroscope was inserted in through the cervix.  The findings are noted; the cervix is long and the endometrium appears thickened, but no focal lesions seen. The Myosure Reach device then inserted and the endometrium was brushed over the endometrial sampling.  After multiple passes the endometrial curettage was then sent to pathology.   The uterus was deflated and the  instrumentation removed.   She was then taken out of the dorsal lithotomy position, awakened, and taken to the recovery room in stable condition.    No apparent complications.  Counts were correct.     Nico Cottrell MD

## 2023-11-16 NOTE — H&P
I have seen the patient today, and I have reviewed the H&P on record.  At this time, there are no updates indicated.  She desires to continue with the planned procedure.  Nico Cottrell MD

## 2023-11-16 NOTE — ANESTHESIA PREPROCEDURE EVALUATION
Anesthesia Pre-Procedure Evaluation    Patient: Ethel Murillo   MRN: [id number] : 1980        Procedure : Procedure(s):  HYSTEROSCOPY, DIAGNOSTIC, WITH DILATION AND CURETTAGE OF UTERUS          Past Medical History:   Diagnosis Date     Cervical high risk HPV (human papillomavirus) test positive 2013, 2015    type 31, HR HPV     Eczema     of vulva     H/O colposcopy with cervical biopsy 2015    Bx & ECC - Negative     Infertility      LSIL (low grade squamous intraepithelial lesion) on Pap smear 2012    colp 2012 - ROSAURA 1/2     Uterine fibroid 2012      Past Surgical History:   Procedure Laterality Date     CRYOTHERAPY, CERVICAL  2012     LASER CO2 VULVA  2011    small condyloma       MYOMECTOMY UTERUS  8/3/2011    2.5 cm/ 4.5 cm/ 6.5 and 7.5 cm  surgery performed in [place].       MYOMECTOMY UTERUS  2019    30 uterine leiomyoma removed.      Allergies   Allergen Reactions     Guaifenesin Nausea      Social History     Tobacco Use     Smoking status: Never     Smokeless tobacco: Never   Substance Use Topics     Alcohol use: No      Wt Readings from Last 1 Encounters:   23 73.5 kg (162 lb)        Anesthesia Evaluation   Pt has had prior anesthetic. Type: General and MAC.        ROS/MED HX  ENT/Pulmonary:  - neg pulmonary ROS     Neurologic:  - neg neurologic ROS     Cardiovascular:  - neg cardiovascular ROS     METS/Exercise Tolerance:     Hematologic:  - neg hematologic  ROS     Musculoskeletal:  - neg musculoskeletal ROS     GI/Hepatic:  - neg GI/hepatic ROS     Renal/Genitourinary:  - neg Renal ROS     Endo:  - neg endo ROS     Psychiatric/Substance Use:  - neg psychiatric ROS     Infectious Disease:  - neg infectious disease ROS     Malignancy:  - neg malignancy ROS     Other:  - neg other ROS        Physical Exam    Airway  airway exam normal           Respiratory Devices and Support         Dental       (+) Completely normal teeth      Cardiovascular   cardiovascular  "exam normal          Pulmonary   pulmonary exam normal            OUTSIDE LABS:  CBC:   Lab Results   Component Value Date    WBC 3.3 (L) 10/27/2023    WBC 4.1 03/04/2019    HGB 12.6 10/27/2023    HGB 12.9 03/24/2021    HCT 37.9 10/27/2023    HCT 39.9 03/04/2019     10/27/2023     03/04/2019     BMP:   Lab Results   Component Value Date     10/27/2023     02/03/2020    POTASSIUM 4.6 10/27/2023    POTASSIUM 4.0 02/03/2020    CHLORIDE 105 10/27/2023    CHLORIDE 109 02/03/2020    CO2 26 10/27/2023    CO2 27 02/03/2020    BUN 8.7 10/27/2023    BUN 10 02/03/2020    CR 0.68 10/27/2023    CR 0.60 02/03/2020    GLC 90 10/27/2023    GLC 80 02/03/2020     COAGS: No results found for: \"PTT\", \"INR\", \"FIBR\"  POC:   Lab Results   Component Value Date    HCG Negative 06/23/2023     HEPATIC:   Lab Results   Component Value Date    ALBUMIN 3.7 11/28/2017    PROTTOTAL 8.0 11/28/2017    ALT 35 02/04/2019    AST 21 02/04/2019    ALKPHOS 53 11/28/2017    BILITOTAL 1.1 04/16/2019     OTHER:   Lab Results   Component Value Date    ZHENG 8.9 10/27/2023    MAG 2.1 02/04/2019    TSH 1.65 03/24/2021    CRP <2.9 11/30/2017    SED 30 (H) 11/30/2017       Anesthesia Plan    ASA Status:  1    NPO Status:  NPO Appropriate    Anesthesia Type: MAC.     - Reason for MAC: straight local not clinically adequate   Induction: Intravenous, Propofol.   Maintenance: TIVA.        Consents    Anesthesia Plan(s) and associated risks, benefits, and realistic alternatives discussed. Questions answered and patient/representative(s) expressed understanding.     - Discussed:     - Discussed with:  Patient      - Extended Intubation/Ventilatory Support Discussed: No.      - Patient is DNR/DNI Status: No     Use of blood products discussed: No .     Postoperative Care    Pain management: IV analgesics, Oral pain medications.   PONV prophylaxis: Ondansetron (or other 5HT-3), Background Propofol Infusion, Dexamethasone or Solumedrol "     Comments:              Ancelmo Bowling MD

## 2023-11-16 NOTE — INTERVAL H&P NOTE
"I have reviewed the surgical (or preoperative) H&P that is linked to this encounter, and examined the patient. There are no significant changes    Clinical Conditions Present on Arrival:  Clinically Significant Risk Factors Present on Admission                  # Overweight: Estimated body mass index is 27.66 kg/m  as calculated from the following:    Height as of 10/27/23: 1.63 m (5' 4.17\").    Weight as of this encounter: 73.5 kg (162 lb).       "

## 2023-11-16 NOTE — ANESTHESIA CARE TRANSFER NOTE
Patient: Ethel Murillo    Procedure: Procedure(s):  HYSTEROSCOPY, DIAGNOSTIC, WITH DILATION AND CURETTAGE OF UTERUS       Diagnosis: Intramural, submucous, and subserous leiomyoma of uterus [D25.1, D25.0, D25.2]  Cervical os stenosis [N88.2]  Abnormal uterine bleeding (AUB) [N93.9]  Diagnosis Additional Information: No value filed.    Anesthesia Type:   MAC     Note:    Oropharynx: oropharynx clear of all foreign objects and spontaneously breathing  Level of Consciousness: drowsy  Oxygen Supplementation: room air    Independent Airway: airway patency satisfactory and stable  Dentition: dentition unchanged  Vital Signs Stable: post-procedure vital signs reviewed and stable  Report to RN Given: handoff report given  Patient transferred to: Phase II    Handoff Report: Identifed the Patient, Identified the Reponsible Provider, Reviewed the pertinent medical history, Discussed the surgical course, Reviewed Intra-OP anesthesia mangement and issues during anesthesia, Set expectations for post-procedure period and Allowed opportunity for questions and acknowledgement of understanding    Vitals:  Vitals Value Taken Time   BP     Temp     Pulse     Resp     SpO2         Electronically Signed By: BROOKLYNN Pablo CRNA  November 16, 2023  1:01 PM

## 2023-11-16 NOTE — INTERVAL H&P NOTE
"I have reviewed the surgical (or preoperative) H&P that is linked to this encounter, and examined the patient. There are no significant changes    Clinical Conditions Present on Arrival:  Clinically Significant Risk Factors Present on Admission                  # Overweight: Estimated body mass index is 27.66 kg/m  as calculated from the following:    Height as of 10/27/23: 1.63 m (5' 4.17\").    Weight as of 10/27/23: 73.5 kg (162 lb).       "

## 2023-11-21 LAB
PATH REPORT.COMMENTS IMP SPEC: NORMAL
PATH REPORT.FINAL DX SPEC: NORMAL
PATH REPORT.GROSS SPEC: NORMAL
PATH REPORT.MICROSCOPIC SPEC OTHER STN: NORMAL
PATH REPORT.RELEVANT HX SPEC: NORMAL
PHOTO IMAGE: NORMAL

## 2023-11-29 NOTE — ANESTHESIA POSTPROCEDURE EVALUATION
Patient: Ethel Murillo    Procedure: Procedure(s):  OPERATIVE HYSTEROSCOPY WITH MORCELLATOR (MYOSURE)       Anesthesia Type:  MAC    Note:  Disposition: Outpatient   Postop Pain Control: Uneventful            Sign Out: Well controlled pain   PONV: No   Neuro/Psych: Uneventful            Sign Out: Acceptable/Baseline neuro status   Airway/Respiratory: Uneventful            Sign Out: Acceptable/Baseline resp. status   CV/Hemodynamics: Uneventful            Sign Out: Acceptable CV status; No obvious hypovolemia; No obvious fluid overload   Other NRE: NONE   DID A NON-ROUTINE EVENT OCCUR? No       Last vitals:  Vitals Value Taken Time   /78 11/16/23 1325   Temp 97.9  F (36.6  C) 11/16/23 1325   Pulse 62 11/16/23 1325   Resp 16 11/16/23 1325   SpO2 100 % 11/16/23 1325       Electronically Signed By: Ancelmo Bowling MD  November 28, 2023  8:21 PM

## 2023-12-01 ENCOUNTER — OFFICE VISIT (OUTPATIENT)
Dept: OBGYN | Facility: CLINIC | Age: 43
End: 2023-12-01
Payer: COMMERCIAL

## 2023-12-01 VITALS
HEART RATE: 78 BPM | DIASTOLIC BLOOD PRESSURE: 65 MMHG | OXYGEN SATURATION: 96 % | WEIGHT: 163.6 LBS | SYSTOLIC BLOOD PRESSURE: 96 MMHG | BODY MASS INDEX: 27.93 KG/M2

## 2023-12-01 DIAGNOSIS — Z87.410 HISTORY OF CERVICAL DYSPLASIA: ICD-10-CM

## 2023-12-01 DIAGNOSIS — D25.0 INTRAMURAL, SUBMUCOUS, AND SUBSEROUS LEIOMYOMA OF UTERUS: ICD-10-CM

## 2023-12-01 DIAGNOSIS — D25.1 INTRAMURAL, SUBMUCOUS, AND SUBSEROUS LEIOMYOMA OF UTERUS: ICD-10-CM

## 2023-12-01 DIAGNOSIS — D25.2 INTRAMURAL, SUBMUCOUS, AND SUBSEROUS LEIOMYOMA OF UTERUS: ICD-10-CM

## 2023-12-01 DIAGNOSIS — N93.9 ABNORMAL UTERINE BLEEDING: Primary | ICD-10-CM

## 2023-12-01 PROCEDURE — 99214 OFFICE O/P EST MOD 30 MIN: CPT | Performed by: OBSTETRICS & GYNECOLOGY

## 2023-12-01 NOTE — PROGRESS NOTES
Ethel is a 43 year old , She had the hysteroscopy and D&C a couple of weeks ago for abnormal uterine bleeding.  She has a history of uterine leiomyoma and history of myomectomy  Her postop recovery has been uncomplicated.  She is currently requiring no medications for pain management.  She has not had any abnormal uterine bleeding.  She also reports that since she has started the Chinese herbs, the bleeding has been much reduced.  She is aware that I don't have information regarding safety of the products she is using.  She has not had hot flashes. Energy level is normal.  Denies fever, chills.    The pathology report showed:   Final Diagnosis   Endometrium, hysteroscopic sampling/curettage:  -Proliferative endometrium with stromal breakdown, including several fragments with attached myometrium  -Detached fragments of smooth muscle (see comment)  -Unremarkable endocervical mucosa and squamous epithelium  -Negative for hyperplasia, atypia or malignancy       She previously had the ultrasound and the ultrasound findings are compared to the operative findings.     US TRANSVAGINAL PELVIC NON-OB 2023 11:18 AM  CLINICAL HISTORY: Intramural, submucous, and subserous leiomyoma of  uterus; Intramural, submucous, and subserous leiomyoma of uterus;  Intramural, submucous, and subserous leiomyoma of uterus; Abnormal  uterine bleeding  TECHNIQUE: Endovaginal ultrasound was performed to better visualize  the adnexa.  COMPARISON: Pelvis ultrasound 2019  FINDINGS:  UTERUS: 14.5 x 10.7 x 8.9 cm. Enlarged and containing multiple fibroids. Representative fibroids measure 6.7 x 6.3 x 3.3 cm, 7.0 x 5.4 x 4.5 cm, and 7.2 x 5.2 x 4.1 cm.  ENDOMETRIUM: The endometrium is not well visualized due to presence of  fibroids.  RIGHT OVARY: 2.1 x 1.9 x 2.8 cm. Normal with flow demonstrated.  LEFT OVARY: Obscured by bowel gas.  No significant free fluid.                                                              IMPRESSION:  1.   Enlarged myomatous uterus.      The medical history, social history and family history have been reviewed and updated as indicated.      ROS:   ROS:4 point ROS including Respiratory, CV, GI and , other than that noted in the HPI and the PMH, is negative     PE: BP 96/65 (BP Location: Right arm, Cuff Size: Adult Regular)   Pulse 78   Wt 74.2 kg (163 lb 9.6 oz)   LMP 11/12/2023 (Approximate)   SpO2 96%   BMI 27.93 kg/m    General:  WNWD female, NAD  Alert  Oriented x 3  Gait:  Normal  Skin:  Normal skin turgor  HEENT:  NC/AT, EOMI  Abdomen:  Non-tender, non-distended.  Pelvic exam:  Not performed  Extremities:  No clubbing, no cyanosis and no edema.      ASSESSMENT:  Abnormal uterine bleeding   Uterine leiomyoma   History of ROSAURA II      PLAN:  She is reassured with the pathology results.    We discussed the uterine leiomyoma and that I did not see evidence of leiomyoma distorting the endometrium. I don't feel that, at this time, the uterine leiomyoma are the etiology of the bleeding.  Should she decide to have further management of the bleeding and the fibroids, she will let me know.    We also reviewed the HPV history and the ROSAURA II history.  We discussed the ASCCP guidelines and the changes in the pap smear management over the past 10-12 years and how she had management changes over this time.    Total time preparing to see patient with reviewing prior encounter and labs, face to face time,  and coordinating care on the same calendar date:  30 minutes.      Nico Cottrell MD

## 2023-12-27 NOTE — INTERVAL H&P NOTE
"I have reviewed the surgical (or preoperative) H&P that is linked to this encounter, and examined the patient. There are no significant changes    Clinical Conditions Present on Arrival:  Clinically Significant Risk Factors Present on Admission                  # Overweight: Estimated body mass index is 27.66 kg/m  as calculated from the following:    Height as of 10/27/23: 1.63 m (5' 4.17\").    Weight as of this encounter: 73.5 kg (162 lb).       " yes

## 2024-05-03 SDOH — HEALTH STABILITY: PHYSICAL HEALTH: ON AVERAGE, HOW MANY MINUTES DO YOU ENGAGE IN EXERCISE AT THIS LEVEL?: 0 MIN

## 2024-05-03 SDOH — HEALTH STABILITY: PHYSICAL HEALTH: ON AVERAGE, HOW MANY DAYS PER WEEK DO YOU ENGAGE IN MODERATE TO STRENUOUS EXERCISE (LIKE A BRISK WALK)?: 0 DAYS

## 2024-05-03 ASSESSMENT — SOCIAL DETERMINANTS OF HEALTH (SDOH): HOW OFTEN DO YOU GET TOGETHER WITH FRIENDS OR RELATIVES?: ONCE A WEEK

## 2024-05-10 ENCOUNTER — OFFICE VISIT (OUTPATIENT)
Dept: INTERNAL MEDICINE | Facility: CLINIC | Age: 44
End: 2024-05-10
Payer: COMMERCIAL

## 2024-05-10 VITALS
RESPIRATION RATE: 16 BRPM | WEIGHT: 162 LBS | HEART RATE: 80 BPM | HEIGHT: 64 IN | BODY MASS INDEX: 27.66 KG/M2 | DIASTOLIC BLOOD PRESSURE: 72 MMHG | OXYGEN SATURATION: 98 % | TEMPERATURE: 98.3 F | SYSTOLIC BLOOD PRESSURE: 109 MMHG

## 2024-05-10 DIAGNOSIS — Z00.00 ROUTINE GENERAL MEDICAL EXAMINATION AT A HEALTH CARE FACILITY: Primary | ICD-10-CM

## 2024-05-10 DIAGNOSIS — Z13.220 SCREENING CHOLESTEROL LEVEL: ICD-10-CM

## 2024-05-10 DIAGNOSIS — G43.901 MIGRAINE WITH STATUS MIGRAINOSUS, NOT INTRACTABLE, UNSPECIFIED MIGRAINE TYPE: ICD-10-CM

## 2024-05-10 DIAGNOSIS — Z11.4 SCREENING FOR HIV (HUMAN IMMUNODEFICIENCY VIRUS): ICD-10-CM

## 2024-05-10 DIAGNOSIS — D72.819 LEUKOPENIA, UNSPECIFIED TYPE: ICD-10-CM

## 2024-05-10 DIAGNOSIS — E55.9 VITAMIN D DEFICIENCY: ICD-10-CM

## 2024-05-10 DIAGNOSIS — Z13.1 SCREENING FOR DIABETES MELLITUS: ICD-10-CM

## 2024-05-10 DIAGNOSIS — R05.3 PERSISTENT DRY COUGH: ICD-10-CM

## 2024-05-10 DIAGNOSIS — G43.919 INTRACTABLE MIGRAINE WITHOUT STATUS MIGRAINOSUS, UNSPECIFIED MIGRAINE TYPE: ICD-10-CM

## 2024-05-10 DIAGNOSIS — Z12.11 SCREEN FOR COLON CANCER: ICD-10-CM

## 2024-05-10 DIAGNOSIS — Z11.59 NEED FOR HEPATITIS C SCREENING TEST: ICD-10-CM

## 2024-05-10 DIAGNOSIS — Z13.29 SCREENING FOR THYROID DISORDER: ICD-10-CM

## 2024-05-10 LAB
ALBUMIN SERPL BCG-MCNC: 4.3 G/DL (ref 3.5–5.2)
ALP SERPL-CCNC: 50 U/L (ref 40–150)
ALT SERPL W P-5'-P-CCNC: 23 U/L (ref 0–50)
ANION GAP SERPL CALCULATED.3IONS-SCNC: 9 MMOL/L (ref 7–15)
AST SERPL W P-5'-P-CCNC: 21 U/L (ref 0–45)
BASOPHILS # BLD AUTO: 0 10E3/UL (ref 0–0.2)
BASOPHILS NFR BLD AUTO: 0 %
BILIRUB SERPL-MCNC: 0.6 MG/DL
BUN SERPL-MCNC: 16.4 MG/DL (ref 6–20)
CALCIUM SERPL-MCNC: 9.1 MG/DL (ref 8.6–10)
CHLORIDE SERPL-SCNC: 104 MMOL/L (ref 98–107)
CHOLEST SERPL-MCNC: 230 MG/DL
CREAT SERPL-MCNC: 0.73 MG/DL (ref 0.51–0.95)
DEPRECATED HCO3 PLAS-SCNC: 24 MMOL/L (ref 22–29)
EGFRCR SERPLBLD CKD-EPI 2021: >90 ML/MIN/1.73M2
EOSINOPHIL # BLD AUTO: 0.1 10E3/UL (ref 0–0.7)
EOSINOPHIL NFR BLD AUTO: 2 %
ERYTHROCYTE [DISTWIDTH] IN BLOOD BY AUTOMATED COUNT: 12.6 % (ref 10–15)
FASTING STATUS PATIENT QL REPORTED: YES
FASTING STATUS PATIENT QL REPORTED: YES
GLUCOSE SERPL-MCNC: 98 MG/DL (ref 70–99)
HBA1C MFR BLD: 4.9 % (ref 0–5.6)
HCT VFR BLD AUTO: 37.1 % (ref 35–47)
HCV AB SERPL QL IA: NONREACTIVE
HDLC SERPL-MCNC: 47 MG/DL
HGB BLD-MCNC: 12.4 G/DL (ref 11.7–15.7)
HIV 1+2 AB+HIV1 P24 AG SERPL QL IA: NONREACTIVE
IMM GRANULOCYTES # BLD: 0 10E3/UL
IMM GRANULOCYTES NFR BLD: 0 %
LDLC SERPL CALC-MCNC: 170 MG/DL
LYMPHOCYTES # BLD AUTO: 1 10E3/UL (ref 0.8–5.3)
LYMPHOCYTES NFR BLD AUTO: 21 %
MCH RBC QN AUTO: 30.9 PG (ref 26.5–33)
MCHC RBC AUTO-ENTMCNC: 33.4 G/DL (ref 31.5–36.5)
MCV RBC AUTO: 93 FL (ref 78–100)
MONOCYTES # BLD AUTO: 0.5 10E3/UL (ref 0–1.3)
MONOCYTES NFR BLD AUTO: 10 %
NEUTROPHILS # BLD AUTO: 3.2 10E3/UL (ref 1.6–8.3)
NEUTROPHILS NFR BLD AUTO: 66 %
NONHDLC SERPL-MCNC: 183 MG/DL
PLATELET # BLD AUTO: 251 10E3/UL (ref 150–450)
POTASSIUM SERPL-SCNC: 4.9 MMOL/L (ref 3.4–5.3)
PROT SERPL-MCNC: 7.8 G/DL (ref 6.4–8.3)
RBC # BLD AUTO: 4.01 10E6/UL (ref 3.8–5.2)
SODIUM SERPL-SCNC: 137 MMOL/L (ref 135–145)
TRIGL SERPL-MCNC: 67 MG/DL
TSH SERPL DL<=0.005 MIU/L-ACNC: 1.86 UIU/ML (ref 0.3–4.2)
VIT D+METAB SERPL-MCNC: 26 NG/ML (ref 20–50)
WBC # BLD AUTO: 4.8 10E3/UL (ref 4–11)

## 2024-05-10 PROCEDURE — 84443 ASSAY THYROID STIM HORMONE: CPT

## 2024-05-10 PROCEDURE — 85025 COMPLETE CBC W/AUTO DIFF WBC: CPT

## 2024-05-10 PROCEDURE — 86803 HEPATITIS C AB TEST: CPT

## 2024-05-10 PROCEDURE — 87389 HIV-1 AG W/HIV-1&-2 AB AG IA: CPT

## 2024-05-10 PROCEDURE — 83036 HEMOGLOBIN GLYCOSYLATED A1C: CPT

## 2024-05-10 PROCEDURE — 82306 VITAMIN D 25 HYDROXY: CPT

## 2024-05-10 PROCEDURE — 36415 COLL VENOUS BLD VENIPUNCTURE: CPT

## 2024-05-10 PROCEDURE — 99213 OFFICE O/P EST LOW 20 MIN: CPT | Mod: 25

## 2024-05-10 PROCEDURE — 80061 LIPID PANEL: CPT

## 2024-05-10 PROCEDURE — 99396 PREV VISIT EST AGE 40-64: CPT

## 2024-05-10 PROCEDURE — 80053 COMPREHEN METABOLIC PANEL: CPT

## 2024-05-10 RX ORDER — SUMATRIPTAN 25 MG/1
25 TABLET, FILM COATED ORAL
Qty: 10 TABLET | Refills: 1 | Status: SHIPPED | OUTPATIENT
Start: 2024-05-10

## 2024-05-10 NOTE — PROGRESS NOTES
Preventive Care Visit  [facility] BROOKLYNN Colindres CNP, Internal Medicine  May 10, 2024      Assessment & Plan     (Z00.00) Routine general medical examination at a health care facility  (primary encounter diagnosis)  Comment: Patient seen in clinic to preventative health care visit. Patient is making complaint of dry persistent cough and also has been having migraines. Had concerns with past labs and low WBC count. Discussed labs and screenings important to today's visit.   Plan: REVIEW OF HEALTH MAINTENANCE PROTOCOL ORDERS,         CBC with platelets and differential, WNL        Comprehensive metabolic panel (BMP + Alb, Alk         Phos, ALT, AST, Total. Bili, TP)        Pending     (Z12.11) Screen for colon cancer  Comment: Discussed recommendation to screen  Plan: Colonoscopy Screening  Referral        Future     (Z11.4) Screening for HIV (human immunodeficiency virus)  Comment:  Discussed recommendation to screen  Plan: HIV Antigen Antibody Combo        Pending     (Z11.59) Need for hepatitis C screening test  Comment:  Discussed recommendation to screen  Plan: Hepatitis C Screen Reflex to HCV RNA Quant and         Genotype        Pending     (Z13.29) Screening for thyroid disorder  Comment: Discussed recommendation to screen  Plan: TSH with free T4 reflex        Pending     (Z13.220) Screening cholesterol level  Comment:Discussed recommendation to screen  Plan: Lipid panel reflex to direct LDL Fasting        Pending     (Z13.1) Screening for diabetes mellitus  Comment: Discussed recommendation to screen  Plan: Hemoglobin A1c        Pending    (G43.901) Migraine with status migrainosus, not intractable, unspecified migraine type  Comment: Patient is having issues with Migraine. Stated they are very debilitating in nature. Discussed medication management, discussed use of magnesium oxide.   Plan: SUMAtriptan (IMITREX) 25 MG tablet        Prescription sent to  "pharmacy    (R05.3) Persistent dry cough  Comment: Patient has been dealing with dry cough for several months. Discussed reasons that included allergie and dry mouth. Discussed over the counter antihistamines, biotene spray, drinking plenty of fluids. Discussed humidifying the air in the home and using an air . Throat an mouth are noted to be normal. Denies issues with swallowing, no noted abnormalities noted on palpation noted on neck.   Plan: Patient will update if no improvement noted in symptoms.    (E55.9) Vitamin D deficiency  Comment: Discussed checking vitamin D level.  Plan: Vitamin D Deficiency        Pending       Patient has been advised of split billing requirements and indicates understanding: Yes    60 minutes spent by me on the date of the encounter doing chart review, history and exam, documentation and further activities per the note      BMI  Estimated body mass index is 28.25 kg/m  as calculated from the following:    Height as of this encounter: 1.613 m (5' 3.5\").    Weight as of this encounter: 73.5 kg (162 lb).       Counseling  Appropriate preventive services were discussed with this patient, including applicable screening as appropriate for fall prevention, nutrition, physical activity, Tobacco-use cessation, weight loss and cognition.  Checklist reviewing preventive services available has been given to the patient.  Reviewed patient's diet, addressing concerns and/or questions.       Subjective   Agata is a 43 year old, presenting for the following:  Physical        5/10/2024     6:47 AM   Additional Questions   Roomed by Divina HOSKINS        Health Care Directive  Patient does not have a Health Care Directive or Living Will: Discussed advance care planning with patient; however, patient declined at this time.    HPI      Cough since November, migraines         5/3/2024   General Health   How would you rate your overall physical health? Good   Feel stress (tense, anxious, or unable to " sleep) Not at all         5/3/2024   Nutrition   Three or more servings of calcium each day? Yes   Diet: Gluten-free/reduced   How many servings of fruit and vegetables per day? (!) 0-1   How many sweetened beverages each day? 0-1         5/3/2024   Exercise   Days per week of moderate/strenous exercise 0 days   Average minutes spent exercising at this level 0 min   (!) EXERCISE CONCERN      5/3/2024   Social Factors   Frequency of gathering with friends or relatives Once a week   Worry food won't last until get money to buy more No   Food not last or not have enough money for food? No   Do you have housing?  Yes   Are you worried about losing your housing? No   Lack of transportation? No   Unable to get utilities (heat,electricity)? No         5/3/2024   Dental   Dentist two times every year? Yes         5/3/2024   TB Screening   Were you born outside of the [place]? Yes         Today's PHQ-2 Score:       5/10/2024     5:45 AM   PHQ-2 ( 1999 [employer])   Q1: Little interest or pleasure in doing things 0   Q2: Feeling down, depressed or hopeless 0   PHQ-2 Score 0   Q1: Little interest or pleasure in doing things Not at all   Q2: Feeling down, depressed or hopeless Not at all   PHQ-2 Score 0           5/3/2024   Substance Use   Alcohol more than 3/day or more than 7/wk No   Do you use any other substances recreationally? No     Social History     Tobacco Use    Smoking status: Never    Smokeless tobacco: Never   Vaping Use    Vaping status: Never Used   Substance Use Topics    Alcohol use: No    Drug use: No           10/27/2023   LAST FHS-7 RESULTS   1st degree relative breast or ovarian cancer Yes   Any relative bilateral breast cancer Unknown   Any male have breast cancer No   Any ONE woman have BOTH breast AND ovarian cancer Yes   Any woman with breast cancer before 50yrs No   2 or more relatives with breast AND/OR ovarian cancer No   2 or more relatives with breast AND/OR bowel cancer No             5/3/2024   One time  "HIV Screening   Previous HIV test? No         5/3/2024   STI Screening   New sexual partner(s) since last STI/HIV test? No     History of abnormal Pap smear: NO - age 30-65 PAP every 5 years with negative HPV co-testing recommended        Latest Ref Rng & Units 6/10/2022     8:25 AM 12/17/2019     4:48 PM 12/17/2019     3:50 PM   PAP / HPV   PAP  Negative for Intraepithelial Lesion or Malignancy (NILM)      PAP (Historical)   NIL     HPV 16 DNA Negative Negative   Negative    HPV 18 DNA Negative Negative   Negative    Other HR HPV Negative Negative   Negative      ASCVD Risk   The 10-year ASCVD risk score (Raymond CISNEROS, et al., 2019) is: 0.6%    Values used to calculate the score:      Age: 43 years      Sex: Female      Is Non- : No      Diabetic: No      Tobacco smoker: No      Systolic Blood Pressure: 109 mmHg      Is BP treated: No      HDL Cholesterol: 55 mg/dL      Total Cholesterol: 206 mg/dL        5/3/2024   Contraception/Family Planning   Questions about contraception or family planning No     Reviewed and updated as needed this visit by Provider                    Lab work is in process      Review of Systems  Constitutional, HEENT, cardiovascular, pulmonary, gi and gu systems are negative, except as otherwise noted.     Objective    Exam  /72 (BP Location: Left arm, Patient Position: Sitting, Cuff Size: Adult Regular)   Pulse 80   Temp 98.3  F (36.8  C) (Temporal)   Resp 16   Ht 1.613 m (5' 3.5\")   Wt 73.5 kg (162 lb)   LMP 05/10/2024 (Exact Date)   SpO2 98%   BMI 28.25 kg/m     Estimated body mass index is 28.25 kg/m  as calculated from the following:    Height as of this encounter: 1.613 m (5' 3.5\").    Weight as of this encounter: 73.5 kg (162 lb).    Physical Exam  GENERAL: alert and no distress  EYES: Eyes grossly normal to inspection, PERRL and conjunctivae and sclerae normal  HENT: ear canals and TM's normal, nose and mouth without ulcers or " lesions  NECK: no adenopathy, no asymmetry, masses, or scars  RESP: lungs clear to auscultation - no rales, rhonchi or wheezes  CV: regular rate and rhythm, normal S1 S2, no S3 or S4, no murmur, click or rub, no peripheral edema  ABDOMEN: soft, nontender, no hepatosplenomegaly, no masses and bowel sounds normal  MS: no gross musculoskeletal defects noted, no edema  SKIN: no suspicious lesions or rashes  NEURO: Normal strength and tone, mentation intact and speech normal  PSYCH: mentation appears normal, affect normal/bright        Signed Electronically by: BROOKLYNN Newell CNP

## 2024-05-10 NOTE — PATIENT INSTRUCTIONS
"Biotene spray and mouth wash   Magnesium Oxide 200-400 mg     Preventive Care Advice   This is general advice we often give to help people stay healthy. Your care team may have specific advice just for you. Please talk to your care team about your own preventive care needs.  Lifestyle  Exercise at least 150 minutes each week (30 minutes a day, 5 days a week).  Do muscle strengthening activities 2 days a week. These help control your weight and prevent disease.  No smoking.  Wear sunscreen to prevent skin cancer.  Have your home tested for radon every 2 to 5 years. Radon is a colorless, odorless gas that can harm your lungs. To learn more, go to www.health.[facility].[place] and search for \"Radon in Homes.\"  Keep guns unloaded and locked up in a safe place like a safe or gun vault, or, use a gun lock and hide the keys. Always lock away bullets separately. To learn more, visit [employer].[place].gov and search for \"safe gun storage.\"  Nutrition  Eat 5 or more servings of fruits and vegetables each day.  Try wheat bread, brown rice and whole grain pasta (instead of white bread, rice, and pasta).  Get enough calcium and vitamin D. Check the label on foods and aim for 100% of the RDA (recommended daily allowance).  Regular exams  Have a dental exam and cleaning every 6 months.  See your health care team every year to talk about:  Any changes in your health.  Any medicines your care team has prescribed.  Preventive care, family planning, and ways to prevent chronic diseases.  Shots (vaccines)   HPV shots (up to age 26), if you've never had them before.  Hepatitis B shots (up to age 59), if you've never had them before.  COVID-19 shot: Get this shot when it's due.  Flu shot: Get a flu shot every year.  Tetanus shot: Get a tetanus shot every 10 years.  Pneumococcal, hepatitis A, and RSV shots: Ask your care team if you need these based on your risk.  Shingles shot (for age 50 and up).  General health tests  Diabetes screening:  Starting at " age 35, Get screened for diabetes at least every 3 years.  If you are younger than age 35, ask your care team if you should be screened for diabetes.  Cholesterol test: At age 39, start having a cholesterol test every 5 years, or more often if advised.  Bone density scan (DEXA): At age 50, ask your care team if you should have this scan for osteoporosis (brittle bones).  Hepatitis C: Get tested at least once in your life.  Abdominal aortic aneurysm screening: Talk to your doctor about having this screening if you:  Have ever smoked; and  Are biologically male; and  Are between the ages of 65 and 75.  STIs (sexually transmitted infections)  Before age 24: Ask your care team if you should be screened for STIs.  After age 24: Get screened for STIs if you're at risk. You are at risk for STIs (including HIV) if:  You are sexually active with more than one person.  You don't use condoms every time.  You or a partner was diagnosed with a sexually transmitted infection.  If you are at risk for HIV, ask about PrEP medicine to prevent HIV.  Get tested for HIV at least once in your life, whether you are at risk for HIV or not.  Cancer screening tests  Cervical cancer screening: If you have a cervix, begin getting regular cervical cancer screening tests at age 21. Most people who have regular screenings with normal results can stop after age 65. Talk about this with your provider.  Breast cancer scan (mammogram): If you've ever had breasts, begin having regular mammograms starting at age 40. This is a scan to check for breast cancer.  Colon cancer screening: It is important to start screening for colon cancer at age 45.  Have a colonoscopy test every 10 years (or more often if you're at risk) Or, ask your provider about stool tests like a FIT test every year or Cologuard test every 3 years.  To learn more about your testing options, visit: www.3LM/984559.pdf.  For help making a decision, visit: irene/gw34041.  Prostate  cancer screening test: If you have a prostate and are age 55 to 69, ask your provider if you would benefit from a yearly prostate cancer screening test.  Lung cancer screening: If you are a current or former smoker age 50 to 80, ask your care team if ongoing lung cancer screenings are right for you.  For informational purposes only. Not to replace the advice of your health care provider. Copyright   2023 [facility]. All rights reserved. Clinically reviewed by the [facility] Transitions Program. Dafiti [id number] - REV 04/24.

## 2024-09-13 ENCOUNTER — MYC REFILL (OUTPATIENT)
Dept: OBGYN | Facility: CLINIC | Age: 44
End: 2024-09-13
Payer: COMMERCIAL

## 2024-09-13 DIAGNOSIS — L29.2 VULVAR ITCHING: ICD-10-CM

## 2024-09-13 DIAGNOSIS — L30.9 ECZEMA, UNSPECIFIED TYPE: ICD-10-CM

## 2024-09-13 RX ORDER — TRIAMCINOLONE ACETONIDE 1 MG/G
CREAM TOPICAL
Qty: 45 G | Refills: 0 | Status: SHIPPED | OUTPATIENT
Start: 2024-09-13

## 2024-09-13 NOTE — TELEPHONE ENCOUNTER
Requested Prescriptions   Pending Prescriptions Disp Refills    triamcinolone (KENALOG) 0.1 % external cream 45 g 3     Si.5 g to vulva (once a day) every other day.       Topical Steroids and Nonsteroidals Protocol Passed - 2024  9:38 AM        Passed - Patient is age 6 or older        Passed - Authorizing prescriber's most recent note related to this medication read.     If refill request is for ophthalmic use, please forward request to provider for approval.          Passed - High potency steroid not ordered        Passed - Recent (12 mo) or future (30 days) visit within the authorizing provider's specialty     The patient must have completed an in-person or virtual visit within the past 12 months or has a future visit scheduled within the next 90 days with the authorizing provider s specialty.  Urgent care and e-visits do not quality as an office visit for this protocol.          Passed - Medication is active on med list           Pt last seen 2023 for AUB    Last prescribed 2023 for 45g with 3 refills    Prescription approved per [facility] Refill Protocol.    Kathy Cobos RN on 2024 at 9:40 AM

## 2024-09-16 DIAGNOSIS — L29.2 VULVAR ITCHING: ICD-10-CM

## 2024-09-16 DIAGNOSIS — L30.9 ECZEMA, UNSPECIFIED TYPE: ICD-10-CM

## 2024-09-16 RX ORDER — TRIAMCINOLONE ACETONIDE 1 MG/G
CREAM TOPICAL
Qty: 45 G | Refills: 0 | Status: CANCELLED | OUTPATIENT
Start: 2024-09-16

## 2024-09-16 NOTE — TELEPHONE ENCOUNTER
Requested Prescriptions   Pending Prescriptions Disp Refills    triamcinolone (KENALOG) 0.1 % external cream 45 g 0     Si.5 g to vulva (once a day) every other day.       Topical Steroids and Nonsteroidals Protocol Failed - 2024 12:16 PM        Failed - Recent (12 mo) or future (30 days) visit within the authorizing provider's specialty     The patient must have completed an in-person or virtual visit within the past 12 months or has a future visit scheduled within the next 90 days with the authorizing provider s specialty.  Urgent care and e-visits do not quality as an office visit for this protocol.          Passed - Patient is age 6 or older        Passed - Authorizing prescriber's most recent note related to this medication read.     If refill request is for ophthalmic use, please forward request to provider for approval.          Passed - High potency steroid not ordered        Passed - Medication is active on med list           Pt last seen 2023 for AUB    Last prescribed 2024 for 45g with 0 refills    Refills remain at patient's pharmacy. Refill not appropriate at this time, because there are additional refills remaining on current prescription      Kathy Cobos RN on 2024 at 12:17 PM

## 2024-10-10 ENCOUNTER — TELEPHONE (OUTPATIENT)
Dept: INTERNAL MEDICINE | Facility: CLINIC | Age: 44
End: 2024-10-10
Payer: COMMERCIAL

## 2024-10-10 NOTE — TELEPHONE ENCOUNTER
Patient Quality Outreach    Patient is due for the following:   Colon Cancer Screening    Next Steps:   Due for a colonoscopy    Type of outreach:    Sent [employer] message.    Next Steps:  Reach out within 90 days via [employer].    Max number of attempts reached: Yes. Will try again in 90 days if patient still on fail list.    Questions for provider review:    None           Divina Zheng CMA  Chart routed to closing chart .

## 2024-12-17 ENCOUNTER — OFFICE VISIT (OUTPATIENT)
Dept: OBGYN | Facility: CLINIC | Age: 44
End: 2024-12-17
Payer: COMMERCIAL

## 2024-12-17 VITALS
OXYGEN SATURATION: 97 % | BODY MASS INDEX: 27.79 KG/M2 | WEIGHT: 159.4 LBS | SYSTOLIC BLOOD PRESSURE: 116 MMHG | HEART RATE: 96 BPM | DIASTOLIC BLOOD PRESSURE: 74 MMHG

## 2024-12-17 DIAGNOSIS — G43.109 MIGRAINE WITH AURA AND WITHOUT STATUS MIGRAINOSUS, NOT INTRACTABLE: ICD-10-CM

## 2024-12-17 DIAGNOSIS — D25.1 INTRAMURAL, SUBMUCOUS, AND SUBSEROUS LEIOMYOMA OF UTERUS: Primary | ICD-10-CM

## 2024-12-17 DIAGNOSIS — D25.0 INTRAMURAL, SUBMUCOUS, AND SUBSEROUS LEIOMYOMA OF UTERUS: Primary | ICD-10-CM

## 2024-12-17 DIAGNOSIS — D25.2 INTRAMURAL, SUBMUCOUS, AND SUBSEROUS LEIOMYOMA OF UTERUS: Primary | ICD-10-CM

## 2024-12-17 PROCEDURE — 99214 OFFICE O/P EST MOD 30 MIN: CPT | Performed by: OBSTETRICS & GYNECOLOGY

## 2024-12-17 NOTE — PROGRESS NOTES
Ethel is a 44 year old  here for follow up of uterine leiomyoma.  She noted a change in 2024.  She noted her stomach became bigger.   10 days after menses with ovulation, she notes her bloating sensation, urgency and urinary frequency.    She has a couple of seconds of dull pain in the lower right quadrant.   She has noticed a fluttering or twinges  in the abdomen in general and in the right lower quadrant.  This has improved with BM and when she eats small meals.     She has also had stinging ovulatory pain.    She has not had heavy menses and she notes they are shorter and she has spotting.  She will use about 1 to 2 pads a day.   She does not have the heavy bleeding or the pain in her back that she had previously.  She has noted the days of bleeding are less, but the cycles have been lengthening.    She has had migraines with aura. They may occur before the menses, but also during the menses.  She has a trigger with bright light.  She has had slurred speech and she cannot write.  She may have associated numbness.  She has frontal headaches above her eyebrows.  She will go to bed and sleep.  She then has brain fog that lasts a month.    She has not had these since July as her acupuncture has given her an herbal product.     She went to the ER at [facility] in .  She states she was under stress at that time.  She notes she also had auras and loss of memory and slurred speech when she was in her 20's.  When she went to the ER, she had the following CT was performed.  No concerning findings were noted on the CT scan.   EXAM: CT HEAD BRAIN WO  LOCATION: [facility]  DATE/TIME: 2022 11:25 AM  INDICATION: Confusion, Mental status change, unknown cause  COMPARISON: None.  TECHNIQUE: Routine CT Head without IV contrast. Multiplanar reformats. Dose reduction techniques were used.  FINDINGS:  INTRACRANIAL CONTENTS: No intracranial hemorrhage, extraaxial collection, or mass effect.  No CT evidence  of acute infarct. Normal parenchymal attenuation. Normal ventricles and sulci.  VISUALIZED ORBITS/SINUSES/MASTOIDS: No intraorbital abnormality. No paranasal sinus mucosal disease. No middle ear or mastoid effusion.  BONES/SOFT TISSUES: No acute abnormality.  Impression  1.  No acute intracranial process.  Urinary frequency has been occurring.         She has had a prior ultrasound about 1.5 years ago, that shows enlarged uterus with multiple fibroids.    US TRANSVAGINAL PELVIC NON-OB 6/27/2023 11:18 AM  CLINICAL HISTORY: Intramural, submucous, and subserous leiomyoma of  uterus; Intramural, submucous, and subserous leiomyoma of uterus;  Intramural, submucous, and subserous leiomyoma of uterus; Abnormal  uterine bleeding  TECHNIQUE: Endovaginal ultrasound was performed to better visualize  the adnexa.  COMPARISON: Pelvis ultrasound 2/13/2019  FINDINGS:  UTERUS: 14.5 x 10.7 x 8.9 cm. Enlarged and containing multiple  fibroids. Representative fibroids measure 6.7 x 6.3 x 3.3 cm, 7.0 x  5.4 x 4.5 cm, and 7.2 x 5.2 x 4.1 cm.  ENDOMETRIUM: The endometrium is not well visualized due to presence of  fibroids.  RIGHT OVARY: 2.1 x 1.9 x 2.8 cm. Normal with flow demonstrated.  LEFT OVARY: Obscured by bowel gas.  No significant free fluid.                                                           IMPRESSION:  1.  Enlarged myomatous uterus      Past Medical History:   Diagnosis Date    Cervical high risk HPV (human papillomavirus) test positive 11/2013, 1/2015    type 31, HR HPV    Eczema     of vulva    H/O colposcopy with cervical biopsy 2/2015    Bx & ECC - Negative    Infertility     LSIL (low grade squamous intraepithelial lesion) on Pap smear 11/2012    colp 11/2012 - ROSAURA 1/2    Uterine fibroid 11/2012       Past Surgical History:   Procedure Laterality Date    CRYOTHERAPY, CERVICAL  12/2012    LASER CO2 VULVA  12/2011    small condyloma      MYOMECTOMY UTERUS  8/3/2011    2.5 cm/ 4.5 cm/ 6.5 and 7.5 cm  surgery performed  in [place].      MYOMECTOMY UTERUS  03/18/2019    30 uterine leiomyoma removed.    OPERATIVE HYSTEROSCOPY WITH MORCELLATOR N/A 11/16/2023    Procedure: OPERATIVE HYSTEROSCOPY WITH MORCELLATOR (MYOSURE);  Surgeon: Nico Cottrell MD;  Location: [facility] OR        Allergies   Allergen Reactions    Guaifenesin Nausea    Robitussin Cough+Chest Ernesto Dm [Dextromethorphan-Guaifenesin]        Current Outpatient Medications   Medication Sig Dispense Refill    SUMAtriptan (IMITREX) 25 MG tablet Take 1 tablet (25 mg) by mouth at onset of headache for migraine May repeat in 2 hours. Max 8 tablets/24 hours. 10 tablet 1    triamcinolone (KENALOG) 0.1 % external cream 0.5 g to vulva (once a day) every other day. 45 g 0    vitamin D3 (CHOLECALCIFEROL) 2000 units tablet Take 1 tablet by mouth daily         Social History     Socioeconomic History    Marital status:      Spouse name: Not on file    Number of children: 0    Years of education: Not on file    Highest education level: Not on file   Occupational History     Employer: UNEMPLOYED   Tobacco Use    Smoking status: Never    Smokeless tobacco: Never   Vaping Use    Vaping status: Never Used   Substance and Sexual Activity    Alcohol use: No    Drug use: No    Sexual activity: Yes     Partners: Male   Other Topics Concern    Parent/sibling w/ CABG, MI or angioplasty before 65F 55M? No   Social History Narrative    Not on file     Social Drivers of Health     Financial Resource Strain: Low Risk  (5/3/2024)    Financial Resource Strain     Within the past 12 months, have you or your family members you live with been unable to get utilities (heat, electricity) when it was really needed?: No   Food Insecurity: Low Risk  (5/3/2024)    Food Insecurity     Within the past 12 months, did you worry that your food would run out before you got money to buy more?: No     Within the past 12 months, did the food you bought just not last and you didn t have money to get more?: No    Transportation Needs: Low Risk  (5/3/2024)    Transportation Needs     Within the past 12 months, has lack of transportation kept you from medical appointments, getting your medicines, non-medical meetings or appointments, work, or from getting things that you need?: No   Physical Activity: Inactive (5/3/2024)    Exercise Vital Sign     Days of Exercise per Week: 0 days     Minutes of Exercise per Session: 0 min   Stress: No Stress Concern Present (5/3/2024)    [employer] [place] [employer] - Occupational Stress Questionnaire     Feeling of Stress : Not at all   Social Connections: Unknown (5/3/2024)    Social Connection and Isolation Panel [NHANES]     Frequency of Communication with Friends and Family: Not on file     Frequency of Social Gatherings with Friends and Family: Once a week     Attends Sabianist Services: Not on file     Active Member of Clubs or Organizations: Not on file     Attends Club or Organization Meetings: Not on file     Marital Status: Not on file   Interpersonal Safety: Low Risk  (5/10/2024)    Interpersonal Safety     Do you feel physically and emotionally safe where you currently live?: Yes     Within the past 12 months, have you been hit, slapped, kicked or otherwise physically hurt by someone?: No     Within the past 12 months, have you been humiliated or emotionally abused in other ways by your partner or ex-partner?: No   Housing Stability: Low Risk  (5/3/2024)    Housing Stability     Do you have housing? : Yes     Are you worried about losing your housing?: No       Family History   Problem Relation Age of Onset    Hypertension Mother     Asthma Mother     Ulcerative Colitis Mother     Hypertension Father        Review of Systems:  10 point ROS of systems including Constitutional, Eyes, Respiratory, Cardiovascular, Gastroenterology, Genitourinary, Integumentary, Muscularskeletal, Psychiatric were all negative except for pertinent positives noted in my HPI and in the  PMH.      Exam  /74 (BP Location: Right arm, Cuff Size: Adult Regular)   Pulse 96   Wt 72.3 kg (159 lb 6.4 oz)   LMP 11/27/2024 (Exact Date)   SpO2 97%   BMI 27.79 kg/m    General:  WNWD female, NAD  Alert  Oriented x 3  Gait:  Normal  Skin:  Normal skin turgor  HEENT:  NC/AT, EOMI  Lungs:  Good respiratory effort   Pelvic exam:  not performed.    Extremities:  No clubbing, no cyanosis and no edema.      Assessment  Uterine leiomyoma   Migraine with aura      Plan  Schedule ultrasound.  She is contemplating the hysterectomy.  See IM for the history of headaches and auras.       Total time preparing to see patient with reviewing prior encounter and labs, face to face time, coordinating care and documentation, on the same calendar date:  35 minutes.   Nico Cottrell MD

## 2024-12-18 ENCOUNTER — ANCILLARY PROCEDURE (OUTPATIENT)
Dept: ULTRASOUND IMAGING | Facility: CLINIC | Age: 44
End: 2024-12-18
Attending: OBSTETRICS & GYNECOLOGY
Payer: COMMERCIAL

## 2024-12-18 DIAGNOSIS — D25.1 INTRAMURAL, SUBMUCOUS, AND SUBSEROUS LEIOMYOMA OF UTERUS: ICD-10-CM

## 2024-12-18 DIAGNOSIS — D25.0 INTRAMURAL, SUBMUCOUS, AND SUBSEROUS LEIOMYOMA OF UTERUS: ICD-10-CM

## 2024-12-18 DIAGNOSIS — D25.2 INTRAMURAL, SUBMUCOUS, AND SUBSEROUS LEIOMYOMA OF UTERUS: ICD-10-CM

## 2025-01-07 ENCOUNTER — VIRTUAL VISIT (OUTPATIENT)
Dept: FAMILY MEDICINE | Facility: CLINIC | Age: 45
End: 2025-01-07
Payer: COMMERCIAL

## 2025-01-07 DIAGNOSIS — G43.109 MIGRAINE WITH AURA AND WITHOUT STATUS MIGRAINOSUS, NOT INTRACTABLE: Primary | ICD-10-CM

## 2025-01-07 PROCEDURE — 98005 SYNCH AUDIO-VIDEO EST LOW 20: CPT | Performed by: FAMILY MEDICINE

## 2025-01-07 ASSESSMENT — ENCOUNTER SYMPTOMS: HEADACHES: 1

## 2025-01-07 NOTE — PROGRESS NOTES
"Agata is a 44 year old who is being evaluated via a billable video visit.    How would you like to obtain your AVS? MyChart  If the video visit is dropped, the invitation should be resent by: Text to cell phone: [phone]  Will anyone else be joining your video visit? No      Assessment & Plan     Migraine with aura and without status migrainosus, not intractable   Patient requesting referral to neurology for clearance prior to planned hysterectomy;   - Adult Neurology  Referral    BMI  Estimated body mass index is 27.79 kg/m  as calculated from the following:    Height as of 5/10/24: 1.613 m (5' 3.5\").    Weight as of 12/17/24: 72.3 kg (159 lb 6.4 oz).   Weight management plan: Discussed healthy diet and exercise guidelines      See Patient Instructions    Subjective   Agata is a 44 year old, presenting for the following health issues:  Headache    Migraines:    Planned hysterectomy   Wants to make sure everything is well    Last one was in July: saw a Chinese medicine person and given some herbs and seem to work well.  Onset: were associated with visual changes (saw stars) and got confused, was very scared (2 yrs ago: Dec 2022)  Last one: had several; started with stars, also had some difficulties and numbness in Rt hand.      1/7/2025     5:02 PM   Additional Questions   Roomed by alvaro Brennan ma   Accompanied by self - video visit     Headache     History of Present Illness       Headaches:   Since the patient's last clinic visit, headaches are: improved  The patient is getting headaches:  My last aura migraine was in July. I take Chinese herbs for it and it helps. My OB GYN wanted me to see a neurologist because I will have a hysterectomy in a couple of months so he wanted me to check if everything is ok related to my migraines.  She is not able to do normal daily activities when she has a migraine.  The patient is taking the following rescue/relief medications:  Ibuprofen (Advil, Motrin)   Patient states \"I " "get some relief\" from the rescue/relief medications.   The patient is taking the following medications to prevent migraines:  Other  In the past 4 weeks, the patient has gone to an Urgent Care or Emergency Room 0 times times due to headaches.   She is taking medications regularly.         Review of Systems  Constitutional, HEENT, cardiovascular, pulmonary, gi and gu systems are negative, except as otherwise noted.      Objective    Vitals - Patient Reported  Pain Score: No Pain (0)      Vitals:  No vitals were obtained today due to virtual visit.    Physical Exam         Video-Visit Details    Type of service:  Video Visit   Originating Location (pt. Location): Home  Distant Location (provider location):  On-site  Platform used for Video Visit: Kp    Signed Electronically by: Israel Ramirez MD    "

## 2025-01-27 NOTE — MR AVS SNAPSHOT
After Visit Summary   6/27/2017    Ethel Murillo    MRN: [id number]           Patient Information     Date Of Birth          1980        Visit Information        Provider Department      6/27/2017 9:15 AM Nico Cottrell MD [facility]         Follow-ups after your visit        Your next 10 appointments already scheduled     Jul 07, 2017  8:00 AM CDT   [facility] Visit with Nico Cottrell MD   [facility]    [address] [place] [id number]   [phone]           Bring a current list of meds and any records pertaining to this visit.  For Physicals, please bring immunization records and any forms needing to be filled out.  Please arrive 10 minutes early to complete paperwork.            Jul 28, 2017 10:30 AM CDT   Post Op with Nico Cottrell MD   [facility] ([facility]    [address] [place] [id number]   [phone]              Who to contact     If you have questions or need follow up information about today's clinic visit or your schedule please contact [facility] directly at [phone].  Normal or non-critical lab and imaging results will be communicated to you by MyChart, letter or phone within 4 business days after the clinic has received the results. If you do not hear from us within 7 days, please contact the clinic through ByteLighthart or phone. If you have a critical or abnormal lab result, we will notify you by phone as soon as possible.  Submit refill requests through [employer] or call your pharmacy and they will forward the refill request to us. Please allow 3 business days for your refill to be completed.          Additional Information About Your Visit        [employer]Light[employer]oopport Information     [employer] gives you secure access to your electronic health record. If you see a primary care provider, you can also send messages to your care team and make  Anesthesia Pre Eval Note    Anesthesia ROS/Med Hx    Overall Review:  EKG was reviewed     Anesthetic Complication History:    Patient does not have a history of anesthetic complications      Pulmonary Review:    Positive for COPD - Severity: mild    Positive for asthma, well controlled  The patient is a smoker.    Neuro/Psych Review:       Positive for TIA  Positive for CVA - no residual deficits    Cardiovascular Review:     Positive for hypertension - well controlled  Positive for hyperlipidemia    GI/HEPATIC/RENAL Review:     Positive for GERD - well controlled    End/Other Review:    Positive for arthritis  Additional Results:  EKG:  Encounter Date: 01/09/25  -Electrocardiogram 12-Lead:        Result                      Value                           Ventricular Rate EKG/M*     62                              Atrial Rate (BPM)           62                              HI-Interval (MSEC)          156                             QRS-Interval (MSEC)         94                              QT-Interval (MSEC)          418                             QTc                         424                             P Axis (Degrees)            52                              R Axis (Degrees)            -36                             T Axis (Degrees)            -35                             REPORT TEXT                                             Normal sinus rhythm   Left axis deviation   Minimal voltage criteria for LVH, may be normal variant   (   R in aVL   )   Abnormal ECG   When compared with ECG of   09-JUL-2024 08:50,   premature supraventricular complexes   are no longer   present   T wave inversion more evident in   Inferior leads   Confirmed by fellow Gee Marrero ([id number]) on 1/9/2025 10:06:16 AM   Confirmed by ISIDRO LOCKWOOD MD ([id number]) on 1/9/2025 10:55:52 AM       ALLERGIES:   -- Tramadol -- RASH and PRURITUS   Last Labs        Component                Value               Date/Time                  WBC                       8.2                 01/09/2025 0956            RBC                      5.88                01/09/2025 0956            HGB                      12.9 (L)            01/09/2025 0956            HCT                      41.1                01/09/2025 0956            MCV                      69.9 (L)            01/09/2025 0956            MCH                      21.9 (L)            01/09/2025 0956            MCHC                     31.4 (L)            01/09/2025 0956            RDW-CV                   15.9 (H)            01/09/2025 0956            Sodium                   140                 01/09/2025 0956            Potassium                3.8                 01/09/2025 0956            Chloride                 109                 01/09/2025 0956            Carbon Dioxide           25                  01/09/2025 0956            Glucose                  87                  01/09/2025 0956            BUN                      13                  01/09/2025 0956            Creatinine               0.79                01/09/2025 0956            Glomerular Filtrati*     >90                 01/09/2025 0956            Calcium                  9.0                 01/09/2025 0956            PLT                      228                 01/09/2025 0956            PTT                      33 (H)              07/09/2024 0923            INR                      1.0                 07/09/2024 0923        Past Medical History:  No date: Arthritis      Comment:  generalized  No date: Asthma (CMD)      Comment:  patient denies  No date: Blood in stool  No date: Cerebral infarction (CMD)      Comment:  2021  No date: COPD (chronic obstructive pulmonary disease)  (CMD)  No date: Essential (primary) hypertension  No date: Gastroesophageal reflux disease  No date: HTN (hypertension)      Comment:  non compliant with b/p does not take regulary  No date: Hyperplasia, fatty tissue  No date: TIA (transient ischemic attack)  Past  appointments. If you have questions, please call your primary care clinic.  If you do not have a primary care provider, please call [phone] and they will assist you.        Care EveryWhere ID     This is your Care EveryWhere ID. This could be used by other organizations to access your [place] medical records  [id number]        Your Vitals Were     Pulse Last Period Pulse Oximetry Breastfeeding? BMI (Body Mass Index)       76 06/18/2017 (Exact Date) 100% No 25.36 kg/m2        Blood Pressure from Last 3 Encounters:   06/27/17 104/65   06/14/17 106/67   01/23/17 97/64    Weight from Last 3 Encounters:   06/27/17 143 lb 9.6 oz (65.1 kg)   06/14/17 141 lb 6.4 oz (64.1 kg)   01/23/17 146 lb 9.6 oz (66.5 kg)              Today, you had the following     No orders found for display       Primary Care Provider Office Phone # Fax #    Nico Girish Cottrell [phone]       [address] [address]        Equal Access to Services     [facility]: Hadii aad ku hadasho Soomaali, waaxda luqadaha, qaybta kaalmada adeegyada, gloria farfan haylindan adejennifer ruiz . So [facility] [phone].    ATENCIÓN: Si habla español, tiene a howe disposición servicios gratuitos de asistencia lingüística. Llame al [phone].    We comply with applicable federal civil rights laws and [place] laws. We do not discriminate on the basis of race, color, national origin, age, disability sex, sexual orientation or gender identity.            Thank you!     Thank you for choosing [facility]  for your care. Our goal is always to provide you with excellent care. Hearing back from our patients is one way we can continue to improve our services. Please take a few minutes to complete the written survey that you may receive in the mail after your visit with us. Thank you!             Your Updated Medication List - Protect others around you: Learn how to safely use, store and  Surgical History:  No date: Esophagogastroduodenoscopy transoral flex w/bx single or mult  No date: Fracture surgery      Comment:  right  leg  No date: Open access colonoscopy  No date: Orthopedic surgery; Right      Comment:  plate and carmel in ankle   Prior to Admission medications :  Medication acetaminophen (TYLENOL) 500 MG tablet, Sig Take 1,000 mg by mouth every 6 hours as needed for Pain., Start Date , End Date , Taking? Yes, Authorizing Provider Rylan Mccartney    Medication albuterol 108 (90 Base) MCG/ACT inhaler, Sig Inhale 2 puffs into the lungs every 4 hours as needed for Shortness of Breath or Wheezing., Start Date 11/15/24, End Date , Taking? Yes, Authorizing Provider Brielle Ball CNP    Medication budesonide-formoterol (Symbicort) 160-4.5 MCG/ACT inhaler, Sig Inhale 2 puffs into the lungs in the morning and 2 puffs in the evening., Start Date 11/15/24, End Date , Taking? Yes, Authorizing Provider Brielle Ball CNP    Medication omeprazole (PriLOSEC) 40 MG capsule, Sig Take 1 capsule by mouth in the morning and 1 capsule in the evening.  Patient taking differently: Take 40 mg by mouth 2 times daily as needed. Indications: Heartburn, Start Date 9/26/24, End Date , Taking? Yes, Authorizing Provider Dario Santana MD    Medication atorvastatin (LIPITOR) 40 MG tablet, Sig Take 1 tablet by mouth daily., Start Date 3/26/24, End Date , Taking? Yes, Authorizing Provider Brielle Ball CNP    Medication albuterol (ACCUNEB) 0.63 MG/3ML nebulizer solution, Sig Take 3 mLs by nebulization every 6 hours as needed for Wheezing., Start Date 11/1/23, End Date , Taking? Yes, Authorizing Provider Brielle Ball CNP    Medication amLODIPine (NORVASC) 10 MG tablet, Sig Take 1 tablet by mouth daily., Start Date 11/1/23, End Date , Taking? Yes, Authorizing Provider Brielle Ball CNP    Medication gabapentin (NEURONTIN) 400 MG capsule, Sig Take 400 mg by mouth daily., Start Date , End Date , Taking? Yes,  throw away your medicines at www.disposemymeds.org.          This list is accurate as of: 6/27/17 10:20 AM.  Always use your most recent med list.                   Brand Name Dispense Instructions for use Diagnosis    naproxen 500 MG tablet    NAPROSYN    60 tablet    Take 1 tablet (500 mg) by mouth 2 times daily as needed    Dysmenorrhea          Authorizing Provider Provider, Outside    Medication lisinopril-hydroCHLOROthiazide (ZESTORETIC) 20-25 MG per tablet, Sig TAKE 1 TABLET BY MOUTH DAILY, Start Date 9/29/24, End Date , Taking? , Authorizing Provider Brielle Ball, CNP         Patient Vitals for the past 24 hrs:   BP Temp Temp src Pulse Resp SpO2 Height Weight   01/27/25 0725 135/85 -- -- 79 17 93 % -- --   01/27/25 0720 137/87 -- -- 68 20 96 % -- --   01/27/25 0715 -- -- -- 68 15 95 % -- --   01/27/25 0710 (!) 117/100 -- -- 68 18 96 % -- --   01/27/25 0705 -- -- -- 63 (!) 21 94 % -- --   01/27/25 0700 -- -- -- 76 (!) 22 94 % -- --   01/27/25 0655 -- -- -- 66 18 98 % -- --   01/27/25 0650 -- -- -- 71 20 94 % -- --   01/27/25 0529 (!) 140/93 36.7 °C (98 °F) Oral 78 17 96 % 5' 11\" (1.803 m) 97.5 kg (215 lb)       Social history reviewed:  Social History     Tobacco Use   Smoking Status Every Day    Current packs/day: 0.50    Average packs/day: 0.5 packs/day for 51.1 years (25.5 ttl pk-yrs)    Types: Cigarettes    Start date: 1974   Smokeless Tobacco Never        E-Cigarette/Vaping Substances & Devices    E-Cigarette/Vaping Use Never Used     Nicotine No     THC No     CBD No     Flavoring No     Disposable No     Pre-filled or Refillable Cartridge No     Refillable Tank No     Pre-filled Pod No        Social History     Substance and Sexual Activity   Alcohol Use Not Currently    Comment: social           Relevant Problems   No relevant active problems       Physical Exam     Airway   Mallampati: I  TM Distance: >3 FB  Neck ROM: Full  Neck: Able to place in sniff position  TMJ Mobility: Good    Cardiovascular  Cardiovascular exam normal    Head Assessment  Head assessment: Normocephalic    General Assessment  General Assessment: Alert and oriented    Dental Exam  Dental exam normal  Patient has:  Denied broken/chipped/loose teeth    Pulmonary Exam  Pulmonary exam normal      Anesthesia Plan:  No phone call attempted due to:  ASA Status: 3  Anesthesia  Type: General    Induction: Intravenous  Preferred Airway Type: ETT  Patient does not have a difficult airway or is not at risk of aspiration.   Maintenance: Combined  Premedication: IV    Patient does not have an implantable electronic device requiring post procedure programming.     Post-op Pain Management: Epidural      Checklist  Reviewed: Lab Results, Allergies, Medications, Problem list, NPO Status and Past Med History  Monitors  Discussed risks of the following Invasive monitors with patient: Arterial Line    Consent/Risks Discussed Statement:  The proposed anesthetic plan, including its risks and benefits, have been discussed with the Patient along with the risks and benefits of alternatives. Questions were encouraged and answered and the patient and/or representative understands and agrees to proceed.    I have discussed elements of the patient's history or examination, as noted above and/or as follows, that place the patient at higher risk of complications; neurological disease, smoking, sleep apnea and pulmonary disease.    I discussed with the patient (and/or patient's legal representative) the risks and benefits of the proposed anesthesia plan, General, which may include services performed by other anesthesia providers.    Alternative anesthesia plans, if available, were reviewed with the patient (and/or patient's legal representative). Discussion has been held with the patient (and/or patient's legal representative) regarding risks of anesthesia, which include allergic reaction, anxiety, dental injury, ICU admit, eye injury, nausea, need for blood transfusion, oral injury, sore throat and vomiting and emergent situations that may require change in anesthesia plan.    The patient (and/or patient's legal representative) has indicated understanding, his/her questions have been answered, and he/she wishes to proceed with the planned anesthetic.    Informed Consent for Blood: Consented

## 2025-01-28 ENCOUNTER — TRANSFERRED RECORDS (OUTPATIENT)
Dept: HEALTH INFORMATION MANAGEMENT | Facility: CLINIC | Age: 45
End: 2025-01-28
Payer: COMMERCIAL

## 2025-01-31 ENCOUNTER — TELEPHONE (OUTPATIENT)
Dept: OBGYN | Facility: CLINIC | Age: 45
End: 2025-01-31

## 2025-01-31 DIAGNOSIS — Z87.410 HISTORY OF CERVICAL DYSPLASIA: ICD-10-CM

## 2025-01-31 DIAGNOSIS — D25.0 INTRAMURAL, SUBMUCOUS, AND SUBSEROUS LEIOMYOMA OF UTERUS: Primary | ICD-10-CM

## 2025-01-31 DIAGNOSIS — D25.1 INTRAMURAL, SUBMUCOUS, AND SUBSEROUS LEIOMYOMA OF UTERUS: Primary | ICD-10-CM

## 2025-01-31 DIAGNOSIS — D25.2 INTRAMURAL, SUBMUCOUS, AND SUBSEROUS LEIOMYOMA OF UTERUS: Primary | ICD-10-CM

## 2025-01-31 DIAGNOSIS — N88.2 CERVICAL OS STENOSIS: ICD-10-CM

## 2025-01-31 DIAGNOSIS — G43.109 MIGRAINE WITH AURA AND WITHOUT STATUS MIGRAINOSUS, NOT INTRACTABLE: ICD-10-CM

## 2025-01-31 NOTE — TELEPHONE ENCOUNTER
[facility] SURGERY PLANNING/SCHEDULING WORKSHEET                                                     Ethel Murillo                :  1980  MRN:  [id number]  Home Phone [phone]   Work Phone Not on file.   Mobile [phone]         Surgeon: Nico Cottrell MD    DIAGNOSIS:   uterine leiomyoma / abnormal uterine bleeding history / migraines with auras / history of cervical dysplasia / history of cervical os stenosis     SURGICAL PROCEDURE:  GLORY/BS with TAP Block     Surgery Location:  [facility]  Patient Surgery Class:  Admission with overnight stay of 2 days  Length of Procedure:  180 minutes   Type of anesthesia:  General / TAP Block     Multi-surgeon case: Yes  Additional OR technician needed for assistance?:   No  Vendor needed: No  Positioning:  Supine  Laterality:  NA  Date requested:   she desires later in March     Special Equipment: Ligassure  Special Instructions for patient:  Per the [facility] Pre-Admission Nurse when they call the patient prior to the surgery date.   Precautions:  NONE  :  NOT NEEDED    Sterilization consent:  Yes and was signed on .td.    Preop: Pre-op options: PCP  Pre-surgery consult needed:  Before the surgery day please schedule a 30 minute IN PERSON appt to discuss the details of the surgery.  She may need this earlier in the process, since she needs the EMB and the D&C may need to be performed before the hysterectomy (for permanent, not frozen section)  Postop evaluation needed:  6 weeks    ALLERGIES:   Allergies   Allergen Reactions    Guaifenesin Nausea    Robitussin Cough+Chest Ernesto Dm [Dextromethorphan-Guaifenesin]       BMI:There is no height or weight on file to calculate BMI.  28    The proposed surgical procedure is considered INTERMEDIATE risk.      Nico Cottrell MD    2025

## 2025-02-12 ENCOUNTER — OFFICE VISIT (OUTPATIENT)
Dept: OBGYN | Facility: CLINIC | Age: 45
End: 2025-02-12
Payer: COMMERCIAL

## 2025-02-12 VITALS
DIASTOLIC BLOOD PRESSURE: 66 MMHG | OXYGEN SATURATION: 98 % | HEART RATE: 84 BPM | BODY MASS INDEX: 27.9 KG/M2 | SYSTOLIC BLOOD PRESSURE: 103 MMHG | WEIGHT: 160 LBS

## 2025-02-12 DIAGNOSIS — D25.2 INTRAMURAL, SUBMUCOUS, AND SUBSEROUS LEIOMYOMA OF UTERUS: Primary | ICD-10-CM

## 2025-02-12 DIAGNOSIS — Z12.4 PAP SMEAR FOR CERVICAL CANCER SCREENING: ICD-10-CM

## 2025-02-12 DIAGNOSIS — D25.1 INTRAMURAL, SUBMUCOUS, AND SUBSEROUS LEIOMYOMA OF UTERUS: Primary | ICD-10-CM

## 2025-02-12 DIAGNOSIS — D25.0 INTRAMURAL, SUBMUCOUS, AND SUBSEROUS LEIOMYOMA OF UTERUS: Primary | ICD-10-CM

## 2025-02-12 DIAGNOSIS — N92.0 MENORRHAGIA WITH REGULAR CYCLE: ICD-10-CM

## 2025-02-12 LAB — HCG UR QL: NEGATIVE

## 2025-02-12 PROCEDURE — 81025 URINE PREGNANCY TEST: CPT | Performed by: OBSTETRICS & GYNECOLOGY

## 2025-02-12 NOTE — PROGRESS NOTES
Ethel Murillo is a 44 year old female , who presents for annual exam.   No unusual bleeding, no incontinence, or unusual discharge.   She has uterine fibroids and she desires to proceed with the hysterectomy.    Last cholesterol:   Recent Labs   Lab Test 05/10/24  0804 21  0820   CHOL 230* 206*   HDL 47* 55   * 137*   TRIG 67 70     Past Medical History:   Diagnosis Date    Cervical high risk HPV (human papillomavirus) test positive 2013, 2015    type 31, HR HPV    Eczema     of vulva    H/O colposcopy with cervical biopsy 2015    Bx & ECC - Negative    Infertility     LSIL (low grade squamous intraepithelial lesion) on Pap smear 2012    colp 2012 - ROSAURA 1/2    Migraine     she has seen Neurology    Uterine fibroid 2012       Past Surgical History:   Procedure Laterality Date    CRYOTHERAPY, CERVICAL  2012    LASER CO2 VULVA  2011    small condyloma      MYOMECTOMY UTERUS  8/3/2011    2.5 cm/ 4.5 cm/ 6.5 and 7.5 cm  surgery performed in [place].      MYOMECTOMY UTERUS  2019    30 uterine leiomyoma removed.    OPERATIVE HYSTEROSCOPY WITH MORCELLATOR N/A 2023    Procedure: OPERATIVE HYSTEROSCOPY WITH MORCELLATOR (MYOSURE);  Surgeon: Nico Cottrell MD;  Location:  OR       OB History    Para Term  AB Living   0 0 0 0 0 0   SAB IAB Ectopic Multiple Live Births   0 0 0 0 0       Gyn History:  Gynecological History            Patient's last menstrual period was 2025 (exact date).         history of abnormal pap smear: she has had abnormal pap smear and crythotherapy.  Last pap:   Lab Results   Component Value Date    PAP NIL 2019           Current Outpatient Medications   Medication Sig Dispense Refill    triamcinolone (KENALOG) 0.1 % external cream 0.5 g to vulva (once a day) every other day. 45 g 0    SUMAtriptan (IMITREX) 25 MG tablet Take 1 tablet (25 mg) by mouth at onset of headache for migraine May repeat in 2 hours.  Max 8 tablets/24 hours. 10 tablet 1    vitamin D3 (CHOLECALCIFEROL) 2000 units tablet Take 1 tablet by mouth daily         Allergies   Allergen Reactions    Guaifenesin Nausea    Robitussin Cough+Chest Ernesto Dm [Dextromethorphan-Guaifenesin]        Social History     Socioeconomic History    Marital status:      Spouse name: Not on file    Number of children: 0    Years of education: Not on file    Highest education level: Not on file   Occupational History     Employer: UNEMPLOYED   Tobacco Use    Smoking status: Never     Passive exposure: Never    Smokeless tobacco: Never   Vaping Use    Vaping status: Never Used   Substance and Sexual Activity    Alcohol use: No    Drug use: No    Sexual activity: Yes     Partners: Male   Other Topics Concern    Parent/sibling w/ CABG, MI or angioplasty before 65F 55M? No   Social History Narrative    Not on file     Social Drivers of Health     Financial Resource Strain: Low Risk  (5/3/2024)    Financial Resource Strain     Within the past 12 months, have you or your family members you live with been unable to get utilities (heat, electricity) when it was really needed?: No   Food Insecurity: Low Risk  (5/3/2024)    Food Insecurity     Within the past 12 months, did you worry that your food would run out before you got money to buy more?: No     Within the past 12 months, did the food you bought just not last and you didn t have money to get more?: No   Transportation Needs: Low Risk  (5/3/2024)    Transportation Needs     Within the past 12 months, has lack of transportation kept you from medical appointments, getting your medicines, non-medical meetings or appointments, work, or from getting things that you need?: No   Physical Activity: Inactive (5/3/2024)    Exercise Vital Sign     Days of Exercise per Week: 0 days     Minutes of Exercise per Session: 0 min   Stress: No Stress Concern Present (5/3/2024)    [place] [employer] - Occupational Stress  Questionnaire     Feeling of Stress : Not at all   Social Connections: Unknown (5/3/2024)    Social Connection and Isolation Panel [NHANES]     Frequency of Communication with Friends and Family: Not on file     Frequency of Social Gatherings with Friends and Family: Once a week     Attends Latter day Services: Not on file     Active Member of Clubs or Organizations: Not on file     Attends Club or Organization Meetings: Not on file     Marital Status: Not on file   Interpersonal Safety: Low Risk  (5/10/2024)    Interpersonal Safety     Do you feel physically and emotionally safe where you currently live?: Yes     Within the past 12 months, have you been hit, slapped, kicked or otherwise physically hurt by someone?: No     Within the past 12 months, have you been humiliated or emotionally abused in other ways by your partner or ex-partner?: No   Housing Stability: Low Risk  (5/3/2024)    Housing Stability     Do you have housing? : Yes     Are you worried about losing your housing?: No       Family History   Problem Relation Age of Onset    Hypertension Mother     Asthma Mother     Ulcerative Colitis Mother     Hypertension Father          ROS:  All negative except as above.      EXAM:  /66 (BP Location: Right arm, Cuff Size: Adult Regular)   Pulse 84   Wt 72.6 kg (160 lb)   LMP 02/09/2025 (Exact Date)   SpO2 98%   BMI 27.90 kg/m    General:  WNWD female, NAD  Alert  Oriented x 3  Gait:  Normal  Skin:  Normal skin turgor  Neurologic:  CN grossly intact, good sensation.    HEENT:  NC/AT, EOMI  Neck:  No masses palpated, symmetrical, carotids +2/4, no bruits heard  Heart:  RRR  Lungs:  Clear   Breasts:  Symmetrical, no dimpling noted, no masses palpated, no discharge expressed  Abdomen:  Non-tender, non-distended.  Vulva: No external lesions, normal hair distribution, no adenopathy  BUS:  Normal, no masses noted  Urethra:  No hypermobility noted  Urethral meatus:  No masses noted  Vagina: Moist, pink, no  abnormal discharge, well rugated, no lesions  Cervix: Smooth, pink, no visible lesions  Uterus: enlarged anteverted, non-tender, 20 week size uterus   Ovaries: No mass, non-tender, mobile  Perianal:  No masses noted.  Extremities:  No clubbing, cyanosis, or edema      ASSESSMENT/PLAN   Annual examination with pap smear   Endometrial biopsy for pre-surgical evaluation for hysterectomy  Low fat diet, weight bearing exercises and self breast exams on a monthly basis have been recommended.  I have discussed with patient the risks, benefits, medications, treatment options and modalities.   I have instructed the patient to call or schedule a follow-up appointment if any problems.    Nico Cottrell MD        PROCEDURE NOTE:  The procedure was reviewed with patient.  After consenting to the procedure she was placed into the dorsal lithotomy position.  The examination was performed.  The speculum was placed into the vagina.  The cervix was prepped with Betadine.  The anterior lip of the cervix was grasped with the Allis tenaculum.  The uterus was sounded to 18 cm.  The Pipelle was used to sample the endometrium with 4 passes.    Instruments were removed and the specimen was sent to pathology.  Results to the patient in 1-2 weeks when they are returned.    Nico Cottrell MD

## 2025-02-15 ENCOUNTER — MYC MEDICAL ADVICE (OUTPATIENT)
Dept: OBGYN | Facility: CLINIC | Age: 45
End: 2025-02-15
Payer: COMMERCIAL

## 2025-02-17 LAB
HPV HR 12 DNA CVX QL NAA+PROBE: NEGATIVE
HPV16 DNA CVX QL NAA+PROBE: NEGATIVE
HPV18 DNA CVX QL NAA+PROBE: NEGATIVE
HUMAN PAPILLOMA VIRUS FINAL DIAGNOSIS: NORMAL
PATH REPORT.COMMENTS IMP SPEC: NORMAL
PATH REPORT.COMMENTS IMP SPEC: NORMAL
PATH REPORT.FINAL DX SPEC: NORMAL
PATH REPORT.GROSS SPEC: NORMAL
PATH REPORT.MICROSCOPIC SPEC OTHER STN: NORMAL
PATH REPORT.RELEVANT HX SPEC: NORMAL
PHOTO IMAGE: NORMAL

## 2025-02-20 LAB
BKR AP ASSOCIATED HPV REPORT: NORMAL
BKR LAB AP GYN ADEQUACY: NORMAL
BKR LAB AP GYN INTERPRETATION: NORMAL
BKR LAB AP LMP: NORMAL
BKR LAB AP PREVIOUS ABNORMAL: NORMAL
PATH REPORT.COMMENTS IMP SPEC: NORMAL
PATH REPORT.COMMENTS IMP SPEC: NORMAL
PATH REPORT.RELEVANT HX SPEC: NORMAL

## 2025-03-09 ENCOUNTER — MYC MEDICAL ADVICE (OUTPATIENT)
Dept: OBGYN | Facility: CLINIC | Age: 45
End: 2025-03-09
Payer: COMMERCIAL

## 2025-03-10 NOTE — TELEPHONE ENCOUNTER
Pt is scheduled for a TAB and BS with Dr. Cottrell on 3/26/25.    Pt is wondering if she is able to keep her acupuncture and cupping therapy appt on 3/15/25.    Routing to Dr. Cottrell to confirm if it is okay that pt have acupuncture and cupping 10 days prior to a hysterectomy.    Sara Henriquez RN-[facility]

## 2025-03-11 ENCOUNTER — OFFICE VISIT (OUTPATIENT)
Dept: FAMILY MEDICINE | Facility: CLINIC | Age: 45
End: 2025-03-11
Payer: COMMERCIAL

## 2025-03-11 VITALS
BODY MASS INDEX: 27.14 KG/M2 | HEART RATE: 82 BPM | SYSTOLIC BLOOD PRESSURE: 106 MMHG | OXYGEN SATURATION: 98 % | WEIGHT: 159 LBS | HEIGHT: 64 IN | TEMPERATURE: 98.7 F | RESPIRATION RATE: 14 BRPM | DIASTOLIC BLOOD PRESSURE: 69 MMHG

## 2025-03-11 DIAGNOSIS — D25.1 INTRAMURAL LEIOMYOMA OF UTERUS: ICD-10-CM

## 2025-03-11 DIAGNOSIS — E78.00 ELEVATED CHOLESTEROL: ICD-10-CM

## 2025-03-11 DIAGNOSIS — E55.9 VITAMIN D DEFICIENCY: ICD-10-CM

## 2025-03-11 DIAGNOSIS — Z01.818 PREOP GENERAL PHYSICAL EXAM: Primary | ICD-10-CM

## 2025-03-11 LAB
BASOPHILS # BLD AUTO: 0 10E3/UL (ref 0–0.2)
BASOPHILS NFR BLD AUTO: 0 %
EOSINOPHIL # BLD AUTO: 0.2 10E3/UL (ref 0–0.7)
EOSINOPHIL NFR BLD AUTO: 5 %
ERYTHROCYTE [DISTWIDTH] IN BLOOD BY AUTOMATED COUNT: 13.6 % (ref 10–15)
HCT VFR BLD AUTO: 37.6 % (ref 35–47)
HGB BLD-MCNC: 12.2 G/DL (ref 11.7–15.7)
IMM GRANULOCYTES # BLD: 0 10E3/UL
IMM GRANULOCYTES NFR BLD: 0 %
LYMPHOCYTES # BLD AUTO: 1.1 10E3/UL (ref 0.8–5.3)
LYMPHOCYTES NFR BLD AUTO: 30 %
MCH RBC QN AUTO: 30.2 PG (ref 26.5–33)
MCHC RBC AUTO-ENTMCNC: 32.4 G/DL (ref 31.5–36.5)
MCV RBC AUTO: 93 FL (ref 78–100)
MONOCYTES # BLD AUTO: 0.5 10E3/UL (ref 0–1.3)
MONOCYTES NFR BLD AUTO: 14 %
NEUTROPHILS # BLD AUTO: 1.9 10E3/UL (ref 1.6–8.3)
NEUTROPHILS NFR BLD AUTO: 50 %
PLATELET # BLD AUTO: 246 10E3/UL (ref 150–450)
RBC # BLD AUTO: 4.04 10E6/UL (ref 3.8–5.2)
WBC # BLD AUTO: 3.7 10E3/UL (ref 4–11)

## 2025-03-11 PROCEDURE — 3074F SYST BP LT 130 MM HG: CPT | Performed by: FAMILY MEDICINE

## 2025-03-11 PROCEDURE — 80048 BASIC METABOLIC PNL TOTAL CA: CPT | Performed by: FAMILY MEDICINE

## 2025-03-11 PROCEDURE — 99214 OFFICE O/P EST MOD 30 MIN: CPT | Performed by: FAMILY MEDICINE

## 2025-03-11 PROCEDURE — 85025 COMPLETE CBC W/AUTO DIFF WBC: CPT | Performed by: FAMILY MEDICINE

## 2025-03-11 PROCEDURE — 80061 LIPID PANEL: CPT | Performed by: FAMILY MEDICINE

## 2025-03-11 PROCEDURE — 82306 VITAMIN D 25 HYDROXY: CPT | Performed by: FAMILY MEDICINE

## 2025-03-11 PROCEDURE — 36415 COLL VENOUS BLD VENIPUNCTURE: CPT | Performed by: FAMILY MEDICINE

## 2025-03-11 PROCEDURE — 3078F DIAST BP <80 MM HG: CPT | Performed by: FAMILY MEDICINE

## 2025-03-11 NOTE — PROGRESS NOTES
[facility] Evaluation  [facility]  [address]  [place] [address]  Phone: [phone]  Primary Provider: Nico Cottrell MD  Pre-op Performing Provider: Israel Ramirez MD  Mar 11, 2025         3/8/2025   Surgical Information   What procedure is being done? Hysterectomy   Facility or Hospital where procedure/surgery will be performed: [facility]   Who is doing the procedure / surgery? Nico Cottrell   Date of surgery / procedure: 03/26/2025   Time of surgery / procedure: I don't know yet   Where do you plan to recover after surgery? at home with family     Fax number for surgical facility: Note does not need to be faxed, will be available electronically in Epic.    Assessment & Plan     The proposed surgical procedure is considered INTERMEDIATE risk.    Preop general physical exam    - No identified risk factors. Blood work ordered    Intramural leiomyoma of uterus   - planned for hysterectomy.    Vitamin D deficiency    -  recheck levels    Elevated cholesterol    - fasting today wants recheck       - No identified additional risk factors other than previously addressed    Antiplatelet or Anticoagulation Medication Instructions   - We reviewed the medication list and the patient is not on an antiplatelet or anticoagulation medications.    Additional Medication Instructions   - Herbal medications and vitamins: DO NOT TAKE 14 days prior to surgery.    Recommendation  Approval given to proceed with proposed procedure, without further diagnostic evaluation.    Trae Parmar is a 44 year old, presenting for the following:  Pre-Op Exam          3/11/2025     8:46 AM   Additional Questions   Roomed by tavou   Accompanied by self     HPI: Been having DUB found to have intramural fibroids and is planned for hysterectomy          3/8/2025   Pre-Op Questionnaire   Have you ever had a heart attack or stroke? No   Have you ever had surgery on your heart or blood vessels, such  as a stent placement, a coronary artery bypass, or surgery on an artery in your head, neck, heart, or legs? No   Do you have chest pain with activity? No   Do you have a history of heart failure? No   Do you currently have a cold, bronchitis or symptoms of other infection? No   Do you have a cough, shortness of breath, or wheezing? No   Do you or anyone in your family have previous history of blood clots? (!) YES mother   Do you or does anyone in your family have a serious bleeding problem such as prolonged bleeding following surgeries or cuts? No   Have you ever had problems with anemia or been told to take iron pills? (!) YES    Have you had any abnormal blood loss such as black, tarry or bloody stools, or abnormal vaginal bleeding? No   Have you ever had a blood transfusion? (!) YES   Have you ever had a transfusion reaction? No   Are you willing to have a blood transfusion if it is medically needed before, during, or after your surgery? Yes   Have you or any of your relatives ever had problems with anesthesia? No   Do you have sleep apnea, excessive snoring or daytime drowsiness? No   Do you have any artifical heart valves or other implanted medical devices like a pacemaker, defibrillator, or continuous glucose monitor? No   Do you have artificial joints? No   Are you allergic to latex? No     Health Care Directive  Patient does not have a Health Care Directive: Discussed advance care planning with patient; however, patient declined at this time.    Preoperative Review of    reviewed - no record of controlled substances prescribed.      Patient Active Problem List    Diagnosis Date Noted    Iron deficiency anemia due to chronic blood loss 11/28/2017     Priority: High     Class: Chronic    Cervical os stenosis 09/14/2023     Priority: Medium    Abnormal uterine bleeding (AUB) 09/14/2023     Priority: Medium    Female infertility of other specified origin 10/20/2014     Priority: Medium     Subtle male  factor (low sperm morphology)      CARDIOVASCULAR SCREENING; LDL GOAL LESS THAN 160 09/18/2013     Priority: Medium    Moderate dysplasia of cervix (ROSAURA II) 11/09/2012     Priority: Medium     11/9/12 LSIL. Plan colp  11/21/12 Colpo bx ROSAURA 1/2. Plan cryotherapy  12/12/12 cryotherapy performed. Repeat pap 3 months.    3/29/13 NIL. Repeat pap 3 months.  6/25/13 NIL. Repeat pap 6 months.   11/26/13 NIL, + HR HPV 31. Repeat cotest in 1 year.   1/7/15 NIL, + HR HPV. Plan colp  2/12/15 [facility]x & ECC - Negative. Plan cotest in 1 year.  1/19/16 NIL, Neg HPV. Plan cotest in 1 year.   1/23/17 NIL pap/Neg HPV: plan: routine screening  12/17/19 NIL pap, Neg HPV.  Plan cotest in 3 years.   6/10/22 NIL pap, neg HPV. Cotest every 3 years  2/12/25 NIL pap, neg HPV. Plan cotest in 3 years. Planning hyst.      Fibroid uterus 11/09/2012     Priority: Medium    Dysmenorrhea 11/09/2012     Priority: Medium      Past Medical History:   Diagnosis Date    Cervical high risk HPV (human papillomavirus) test positive 11/2013, 1/2015    type 31, HR HPV    Eczema     of vulva    H/O colposcopy with cervical biopsy 02/01/2015    Bx & ECC - Negative    Infertility     LSIL (low grade squamous intraepithelial lesion) on Pap smear 11/01/2012    colp 11/2012 - ROSAURA 1/2    Migraine     she has seen Neurology    Uterine fibroid 11/01/2012     Past Surgical History:   Procedure Laterality Date    CRYOTHERAPY, CERVICAL  12/2012    LASER CO2 VULVA  12/2011    small condyloma      MYOMECTOMY UTERUS  8/3/2011    2.5 cm/ 4.5 cm/ 6.5 and 7.5 cm  surgery performed in [place].      MYOMECTOMY UTERUS  03/18/2019    30 uterine leiomyoma removed.    OPERATIVE HYSTEROSCOPY WITH MORCELLATOR N/A 11/16/2023    Procedure: OPERATIVE HYSTEROSCOPY WITH MORCELLATOR (MYOSURE);  Surgeon: Nico Cottrell MD;  Location: [facility] OR     Current Outpatient Medications   Medication Sig Dispense Refill    SUMAtriptan (IMITREX) 25 MG tablet Take 1 tablet (25 mg) by mouth at  "onset of headache for migraine May repeat in 2 hours. Max 8 tablets/24 hours. 10 tablet 1    triamcinolone (KENALOG) 0.1 % external cream 0.5 g to vulva (once a day) every other day. 45 g 0    vitamin D3 (CHOLECALCIFEROL) 2000 units tablet Take 1 tablet by mouth daily         Allergies   Allergen Reactions    Guaifenesin Nausea    Robitussin Cough+Chest Ernesto Dm [Dextromethorphan-Guaifenesin]         Social History     Tobacco Use    Smoking status: Never     Passive exposure: Never    Smokeless tobacco: Never   Substance Use Topics    Alcohol use: No     Family History   Problem Relation Age of Onset    Hypertension Mother     Asthma Mother     Ulcerative Colitis Mother     Hypertension Father      History   Drug Use No        Review of Systems  Constitutional, neuro, ENT, endocrine, pulmonary, cardiac, gastrointestinal, genitourinary, musculoskeletal, integument and psychiatric systems are negative, except as otherwise noted.    Objective    /69   Pulse 82   Temp 98.7  F (37.1  C)   Resp 14   Ht 5' 3.5\" (1.613 m)   Wt 159 lb (72.1 kg)   LMP 02/09/2025 (Exact Date)   SpO2 98%   BMI 27.72 kg/m     Estimated body mass index is 27.72 kg/m  as calculated from the following:    Height as of this encounter: 5' 3.5\" (1.613 m).    Weight as of this encounter: 159 lb (72.1 kg).  Physical Exam  GENERAL: alert and no distress  EYES: Eyes grossly normal to inspection, PERRL and conjunctivae and sclerae normal  HENT: ear canals and TM's normal, nose and mouth without ulcers or lesions  NECK: no adenopathy, no asymmetry, masses, or scars  RESP: lungs clear to auscultation - no rales, rhonchi or wheezes  CV: regular rate and rhythm, no murmur, click or rub, no peripheral edema  ABDOMEN: soft, nontender, no masses and bowel sounds normal  MS: no gross musculoskeletal defects noted, no edema  SKIN: no suspicious lesions or rashes  NEURO: Normal strength and tone, mentation intact and speech normal  PSYCH: mentation " appears normal, affect normal/bright    Recent Labs   Lab Test 05/10/24  0804   HGB 12.4         POTASSIUM 4.9   CR 0.73   A1C 4.9        Diagnostics  Labs pending at this time.  Results will be reviewed when available.   No EKG required, no history of coronary heart disease, significant arrhythmia, peripheral arterial disease or other structural heart disease.    Revised Cardiac Risk Index (RCRI)  The patient has the following serious cardiovascular risks for perioperative complications:   - No serious cardiac risks = 0 points     RCRI Interpretation: 0 points: Class I (very low risk - 0.4% complication rate)         Signed Electronically by: Israel Ramirez MD  A copy of this evaluation report is provided to the requesting physician.

## 2025-03-11 NOTE — PATIENT INSTRUCTIONS
How to Take Your Medication Before Surgery  Preoperative Medication Instructions        - No identified additional risk factors other than previously addressed    Antiplatelet or Anticoagulation Medication Instructions   - We reviewed the medication list and the patient is not on an antiplatelet or anticoagulation medications.    Additional Medication Instructions   - Herbal medications and vitamins: DO NOT TAKE 14 days prior to surgery.    Recommendation  Approval given to proceed with proposed procedure, without further diagnostic evaluation.       Patient Education   Preparing for Your Surgery  For Adults  Getting started  In most cases, a nurse will call to review your health history and instructions. They will give you an arrival time based on your scheduled surgery time. Please be ready to share:  Your doctor's clinic name and phone number  Your medical, surgical, and anesthesia history  A list of allergies and sensitivities  A list of medicines, including herbal treatments and over-the-counter drugs  Whether the patient has a legal guardian (ask how to send us the papers in advance)  Note: You may not receive a call if you were seen at our PAC (Preoperative Assessment Center).  Please tell us if you're pregnant--or if there's any chance you might be pregnant. Some surgeries may injure a fetus (unborn baby), so they require a pregnancy test. Surgeries that are safe for a fetus don't always need a test, and you can choose whether to have one.   Preparing for surgery  Within 10 to 30 days of surgery: Have a pre-op exam (sometimes called an H&P, or History and Physical). This can be done at a clinic or pre-operative center.  If you're having a , you may not need this exam. Talk to your care team.  At your pre-op exam, talk to your care team about all medicines you take. (This includes CBD oil and any drugs, such as THC, marijuana, and other forms of cannabis.) If you need to stop any medicine before  surgery, ask when to start taking it again.  This is for your safety. Many medicines and drugs can make you bleed too much during surgery. Some change how well surgery (anesthesia) drugs work.  Call your insurance company to let them know you're having surgery. (If you don't have insurance, call [phone].)  Call your clinic if there's any change in your health. This includes a scrape or scratch near the surgery site, or any signs of a cold (sore throat, runny nose, cough, rash, fever).  Eating and drinking guidelines  For your safety: Unless your surgeon tells you otherwise, follow the guidelines below.  Eat and drink as normal until 8 hours before you arrive for surgery. After that, no food or milk. You can spit out gum when you arrive.  Drink clear liquids until 2 hours before you arrive. These are liquids you can see through, like water, Gatorade, and Propel Water. They also include plain black coffee and tea (no cream or milk).  No alcohol for 24 hours before you arrive. The night before surgery, stop any drinks that contain THC.  If your care team tells you to take medicine on the morning of surgery, it's okay to take it with a sip of water. No other medicines or drugs are allowed (including CBD oil)--follow your care team's instructions.  If you have questions the day of surgery, call your hospital or surgery center.   Preventing infection  Shower or bathe the night before and the morning of surgery. Follow the instructions your clinic gave you. (If no instructions, use regular soap.)  Don't shave or clip hair near your surgery site. We'll remove the hair if needed.  Don't smoke or vape the morning of surgery. No chewing tobacco for 6 hours before you arrive. A nicotine patch is okay. You may spit out nicotine gum when you arrive.  For some surgeries, the surgeon will tell you to fully quit smoking and nicotine.  We will make every effort to keep you safe from infection. We will:  Clean our hands often  with soap and water (or an alcohol-based hand rub).  Clean the skin at your surgery site with a special soap that kills germs.  Give you a special gown to keep you warm. (Cold raises the risk of infection.)  Wear hair covers, masks, gowns, and gloves during surgery.  Give antibiotic medicine, if prescribed. Not all surgeries need this medicine.  What to bring on the day of surgery  Photo ID and insurance card  Copy of your health care directive, if you have one  Glasses and hearing aids (bring cases)  You can't wear contacts during surgery  Inhaler and eye drops, if you use them (tell us about these when you arrive)  CPAP machine or breathing device, if you use them  A few personal items, if spending the night  If you have . . .  A pacemaker, ICD (cardiac defibrillator), or other implant: Bring the ID card.  An implanted stimulator: Bring the remote control.  A legal guardian: Bring a copy of the certified (court-stamped) guardianship papers.  Please remove any jewelry, including body piercings. Leave jewelry and other valuables at home.  If you're going home the day of surgery  You must have a responsible adult drive you home. They should stay with you overnight as well.  If you don't have someone to stay with you, and you aren't safe to go home alone, we may keep you overnight. Insurance often won't pay for this.  After surgery  If it's hard to control your pain or you need more pain medicine, please call your surgeon's office.  Questions?   If you have any questions for your care team, list them here:   ____________________________________________________________________________________________________________________________________________________________________________________________________________________________________________________________  For informational purposes only. Not to replace the advice of your health care provider. Copyright   2003, 2019 [facility]. All rights reserved.  Clinically reviewed by Trino Humphreys MD. [employer] [id number] - REV 08/24.

## 2025-03-12 LAB
ANION GAP SERPL CALCULATED.3IONS-SCNC: 7 MMOL/L (ref 7–15)
BUN SERPL-MCNC: 14.2 MG/DL (ref 6–20)
CALCIUM SERPL-MCNC: 9.2 MG/DL (ref 8.8–10.4)
CHLORIDE SERPL-SCNC: 104 MMOL/L (ref 98–107)
CHOLEST SERPL-MCNC: 226 MG/DL
CREAT SERPL-MCNC: 0.66 MG/DL (ref 0.51–0.95)
EGFRCR SERPLBLD CKD-EPI 2021: >90 ML/MIN/1.73M2
FASTING STATUS PATIENT QL REPORTED: YES
FASTING STATUS PATIENT QL REPORTED: YES
GLUCOSE SERPL-MCNC: 89 MG/DL (ref 70–99)
HCO3 SERPL-SCNC: 28 MMOL/L (ref 22–29)
HDLC SERPL-MCNC: 52 MG/DL
LDLC SERPL CALC-MCNC: 160 MG/DL
NONHDLC SERPL-MCNC: 174 MG/DL
POTASSIUM SERPL-SCNC: 4.6 MMOL/L (ref 3.4–5.3)
SODIUM SERPL-SCNC: 139 MMOL/L (ref 135–145)
TRIGL SERPL-MCNC: 70 MG/DL
VIT D+METAB SERPL-MCNC: 21 NG/ML (ref 20–50)

## 2025-03-13 ENCOUNTER — TRANSFERRED RECORDS (OUTPATIENT)
Dept: HEALTH INFORMATION MANAGEMENT | Facility: CLINIC | Age: 45
End: 2025-03-13
Payer: COMMERCIAL

## 2025-03-18 ENCOUNTER — OFFICE VISIT (OUTPATIENT)
Dept: OBGYN | Facility: CLINIC | Age: 45
End: 2025-03-18
Payer: COMMERCIAL

## 2025-03-18 VITALS — DIASTOLIC BLOOD PRESSURE: 67 MMHG | HEART RATE: 80 BPM | SYSTOLIC BLOOD PRESSURE: 102 MMHG | OXYGEN SATURATION: 98 %

## 2025-03-18 DIAGNOSIS — N92.0 MENORRHAGIA WITH REGULAR CYCLE: Primary | ICD-10-CM

## 2025-03-18 DIAGNOSIS — R39.15 URINARY URGENCY: ICD-10-CM

## 2025-03-18 DIAGNOSIS — R35.0 URINARY FREQUENCY: ICD-10-CM

## 2025-03-18 DIAGNOSIS — D25.0 INTRAMURAL, SUBMUCOUS, AND SUBSEROUS LEIOMYOMA OF UTERUS: ICD-10-CM

## 2025-03-18 DIAGNOSIS — D25.1 INTRAMURAL, SUBMUCOUS, AND SUBSEROUS LEIOMYOMA OF UTERUS: ICD-10-CM

## 2025-03-18 DIAGNOSIS — D25.2 INTRAMURAL, SUBMUCOUS, AND SUBSEROUS LEIOMYOMA OF UTERUS: ICD-10-CM

## 2025-03-18 DIAGNOSIS — R10.2 PELVIC PRESSURE IN FEMALE: ICD-10-CM

## 2025-03-18 PROCEDURE — 3074F SYST BP LT 130 MM HG: CPT | Performed by: OBSTETRICS & GYNECOLOGY

## 2025-03-18 PROCEDURE — 99215 OFFICE O/P EST HI 40 MIN: CPT | Performed by: OBSTETRICS & GYNECOLOGY

## 2025-03-18 PROCEDURE — 3078F DIAST BP <80 MM HG: CPT | Performed by: OBSTETRICS & GYNECOLOGY

## 2025-03-18 NOTE — PROGRESS NOTES
Ethel is a 44 year old  here for follow up of uterine leiomyoma.   She has decided she would like to have the hysterectomy.  She has had 2 prior myomectomy surgeries.   She noted a change in 2024.  She noted her stomach became bigger.  She has not been able to sleep on her stomach due to this.    10 days after menses stops, she will have ovulation associated with stinging pain.  She notes, pelvic heaviness, bloating sensation, urgency and urinary frequency.  This lasts for 10 days and continues into the next cycle.    She has a couple of seconds of dull pain in the lower right quadrant.   She has noticed a fluttering or twinges  in the abdomen in general and in the right lower quadrant.  This has improved with BM and when she eats small meals.   She has not had heavy menses and she notes they are shorter and she has spotting.  She will use about 1 to 2 pads a day.   She does not have the heavy bleeding or the pain in her back that she had previously.  She has noted the days of bleeding are less, but the cycles have been lengthening.    She has had migraines with aura.  Her Neurologist sent her a voicemail message regarding the migraines and the MRI findings.  Agata and I listened to the Neurology message and I have reviewed the MRI results, noted below, with her.   The migraines with auras occur before the menses, but also during the menses.  She has a trigger with bright light.  She has had slurred speech and she cannot write.  She may have associated numbness.  She has frontal headaches above her eyebrows.  She will go to bed and sleep.  She then has brain fog that lasts a month.   If she goes to bed by  to , she will not have as many migraines.  Her acupuncturist has given her an herbal product to use and she feels that has helped.   She went to the ER at [facility] in .  She states she was under stress at that time.  She notes she also had auras and loss of memory and slurred speech when  she was in her 20's.  When she went to the ER, she had the following CT was performed.  No concerning findings were noted on the CT scan.     EXAM: CT HEAD BRAIN WO  LOCATION: [facility]  DATE/TIME: 12/8/2022 11:25 AM  INDICATION: Confusion, Mental status change, unknown cause  COMPARISON: None.  TECHNIQUE: Routine CT Head without IV contrast. Multiplanar reformats. Dose reduction techniques were used.  FINDINGS:  INTRACRANIAL CONTENTS: No intracranial hemorrhage, extraaxial collection, or mass effect.  No CT evidence of acute infarct. Normal parenchymal attenuation. Normal ventricles and sulci.  VISUALIZED ORBITS/SINUSES/MASTOIDS: No intraorbital abnormality. No paranasal sinus mucosal disease. No middle ear or mastoid effusion.  BONES/SOFT TISSUES: No acute abnormality.  Impression  1.  No acute intracranial process.  Urinary frequency has been occurring.         She has had a prior ultrasound about 1.5 years ago, that shows enlarged uterus with multiple fibroids.    US TRANSVAGINAL PELVIC NON-OB 6/27/2023 11:18 AM  CLINICAL HISTORY: Intramural, submucous, and subserous leiomyoma of uterus; Intramural, submucous, and subserous leiomyoma of uterus;  Intramural, submucous, and subserous leiomyoma of uterus; Abnormal uterine bleeding  TECHNIQUE: Endovaginal ultrasound was performed to better visualize the adnexa.  COMPARISON: Pelvis ultrasound 2/13/2019  FINDINGS:  UTERUS: 14.5 x 10.7 x 8.9 cm. Enlarged and containing multiple fibroids. Representative fibroids measure 6.7 x 6.3 x 3.3 cm, 7.0 x 5.4 x 4.5 cm, and 7.2 x 5.2 x 4.1 cm.  ENDOMETRIUM: The endometrium is not well visualized due to presence of fibroids.  RIGHT OVARY: 2.1 x 1.9 x 2.8 cm. Normal with flow demonstrated.  LEFT OVARY: Obscured by bowel gas.  No significant free fluid.                                                           IMPRESSION:  1.  Enlarged myomatous uterus           The following is the MRI that is referenced above.       MRI-Brain  W/O fax (signed: 2025-01-29 02:35:22 PM)  ----------------------------------------------------------------------------------------------------------------------------------------------------------------   MRI-Brain W/O   David Dave MD      Indication:   Hand numbness.      Technique:  Multiplanar, multisequence MRI of the brain was performed without intravenous contrast.       Comparison:  None relevant available.      Findings:  The corpus callosum, pituitary gland clivus appear intact. Craniocervical junction is preserved.   There is no restricted diffusion.   Punctate focus of susceptibility artifact within the left frontal corona radiata (series 9, image 57).   The ventricles are proportionate to the cerebral sulci. The 4th ventricle appears midline. The basal cisterns appear patent. There is a small approximate 1.1 x 2.4 cm (TR oblique/AP oblique) arachnoid cyst within the left middle cranial fossa.   Few scattered nonspecific T2 FLAIR hyperintense white matter foci.   There is no intracranial mass, abnormal mass-effect or midline shift identified.   Major intracranial vascular flow voids appear grossly intact. Both globes are preserved.      Impression:  1. No acute intracranial process.   2. Few scattered nonspecific T2 FLAIR hyperintense white matter foci. Differential considerations include sequela of migraine headaches or chronic ischemic microvascular disease.   3. Punctate chronic microhemorrhage within the left frontal corona radiata.   4. Small arachnoid cyst within the left middle cranial fossa.     Her pap smear history is noted below, and she is contemplating conserving the cervix.    11/9/12 LSIL. Plan colp  11/21/12 Colpo bx ROSAURA 1/2. Plan cryotherapy  12/12/12 cryotherapy performed. Repeat pap 3 months.    3/29/13 NIL. Repeat pap 3 months.  6/25/13 NIL. Repeat pap 6 months.   11/26/13 NIL, + HR HPV 31. Repeat cotest in 1 year.   1/7/15 NIL, + HR HPV. Plan colp  2/12/15 [place] Bx & ECC -  Negative. Plan cotest in 1 year.  1/19/16 NIL, Neg HPV. Plan cotest in 1 year.   1/23/17 NIL pap/Neg HPV: plan: routine screening  12/17/19 NIL pap, Neg HPV.  Plan cotest in 3 years.   6/10/22 NIL pap, neg HPV. Cotest every 3 years      She had a D&C in November 2023.  The results were normal, but she had the D&C due to abnormal bleeding and cervical os stenosis.    Final Diagnosis   Endometrium, hysteroscopic sampling/curettage:  -Proliferative endometrium with stromal breakdown, including several fragments with attached myometrium  -Detached fragments of smooth muscle (see comment)  -Unremarkable endocervical mucosa and squamous epithelium  -Negative for hyperplasia, atypia or malignancy      She had the EMB performed 02/12/2025:  Final Diagnosis   A.  Endometrium, biopsy:  -Fragments of inactive endometrium with focal metaplastic changes and focal stromal breakdown.  -Fragments of benign endocervical tissue with reactive changes and focal microglandular hyperplasia.     The patient and I reviewed the approaches for hysterectomy.   We discussed the different routes of surgery including abdominal, vaginal, and laparoscopic and the possibility that the route of surgery may change during the procedure.  We discussed both total and supracervical hysterectomy and the benefits and contraindications involved.  We discussed ovarian sparing as well as oophorectomy, and the associated risks and benefits.  She also is informed that with ovarian sparing, she could still have inadvertent ovarian failure and the reasons for this were reviewed.  I also reviewed with her that she would have a 25% chance of needing surgery in the future with the ovarian sparing (pain, cysts, malignancies).  This also means that she would have ~75% chance that she would never need surgery in the future, in regard to the ovaries.  The ACOG pamphlets have been given and reviewed with the patient. The patient is aware that hysterectomy will render her  sterile and unable to have further children.The risks of surgery were reviewed and include, but are not limited to, death, brain damage, paralysis of any or all limbs, loss of an organ, function of an organ, disfiguring scars, bleeding, infection, damage to the ovaries, bowel, bladder, ureters and vagina.  In addition, there is a possibility of other procedures that might be deemed necessary at the time of the surgery, depending upon findings and/or complications.  This may also include laparoscopy, laparotomy, and rarely colostomy (which often times can be reversible in the future).  Risks with blood include but are not limited to HIV/AIDS, hepatitis and transfusion reactions, with transfusion reactions being the greatest risk.  She had the blood transfusions when she had her 2nd myomectomy surgery.    We reviewed pre and post op course. Pre op enemas were reviewed.  NPO after MN.  Post op pain management, ambulation, ramos packing's were also reviewed.  Discharge precautions, lifting restrictions, driving restrictions as well as the pelvic activity restrictions were reviewed with her.  Should the hysterectomy inadvertently place her in menopause, she would need to consider HT.   We discussed the possible use of HT.   We reviewed the WHI study that found increase risk in VTE and the cardiovascular incidents associated with the HT.  The WHI was developed with the primary end point related to cardiac disease.  The initial reports showed an increase risk in the first year, equally out over the next couple of years, and then the following years the HT was beneficial.   New information not associated with the WHI, has suggested the benefit of HT regarding cardiac disease, and other medications, such as the statins, do not have beneficial profiles.  I also reviewed with the patient the slight increase in breast cancer in the combined HRT arm (not the ERT arm regarding breast CA).  I also reviewed with her the Confidence  Interval with her and the CI was from 1.0 to 1.59.  Eventhough the breast cancer risk increased by 24%, It is not statistically significant since the CI hit 1.  We also reviewed Amy Nazario's last study of the meta-analysis of the best studies regarding HT back to the 50s.  Her conclusion was no conclusive evidence exists showing increase risk with breast cancer.  I also reviewed with her about the real benefits which include the osteoporosis and fracture risk reduction.  This discussion also dealt with the other medications for fracture risk reduction.  I also reviewed with her about the incidence of colon cancer of 1 in 10 in the general population, but the WHI showed reduction in the combination arm that was significant with the CI range of 0.89 to 0.53.  The theoretical risk regarding the breast cancer risk and the benefits regarding colon cancer are likely related to the progestin use.   We discussed the current recommendation to be on the lowest HT dose possible, for the shortest amount of time.  Other studies are currently being performed, which may change the recommendations.  I recommend to review the HT use on a yearly basis as new information may be released, impacting her choice.  She voiced her understanding.    Patient was given the opportunity to ask questions and have them answered.  The medical history, social history and family history are documented in the electronic record in the appropriate sections.  No updates at this visit.    Past Medical History:   Diagnosis Date    Cervical high risk HPV (human papillomavirus) test positive 11/2013, 1/2015    type 31, HR HPV    Eczema     of vulva    H/O colposcopy with cervical biopsy 02/01/2015    Bx & ECC - Negative    Infertility     LSIL (low grade squamous intraepithelial lesion) on Pap smear 11/01/2012    colp 11/2012 - ROSAURA 1/2    Migraine     she has seen Neurology    Uterine fibroid 11/01/2012     Past Surgical History:   Procedure Laterality Date     CRYOTHERAPY, CERVICAL  12/2012    LASER CO2 VULVA  12/2011    small condyloma      MYOMECTOMY UTERUS  8/3/2011    2.5 cm/ 4.5 cm/ 6.5 and 7.5 cm  surgery performed in [place].      MYOMECTOMY UTERUS  03/18/2019    30 uterine leiomyoma removed.    OPERATIVE HYSTEROSCOPY WITH MORCELLATOR N/A 11/16/2023    Procedure: OPERATIVE HYSTEROSCOPY WITH MORCELLATOR (MYOSURE);  Surgeon: Nico Cottrell MD;  Location: [facility] OR        Allergies   Allergen Reactions    Guaifenesin Nausea    Robitussin Cough+Chest Ernesto Dm [Dextromethorphan-Guaifenesin]        Current Outpatient Medications   Medication Sig Dispense Refill    triamcinolone (KENALOG) 0.1 % external cream 0.5 g to vulva (once a day) every other day. 45 g 0    SUMAtriptan (IMITREX) 25 MG tablet Take 1 tablet (25 mg) by mouth at onset of headache for migraine May repeat in 2 hours. Max 8 tablets/24 hours. 10 tablet 1    vitamin D3 (CHOLECALCIFEROL) 2000 units tablet Take 1 tablet by mouth daily         Social History     Socioeconomic History    Marital status:      Spouse name: Not on file    Number of children: 0    Years of education: Not on file    Highest education level: Not on file   Occupational History     Employer: UNEMPLOYED   Tobacco Use    Smoking status: Never     Passive exposure: Never    Smokeless tobacco: Never   Vaping Use    Vaping status: Never Used   Substance and Sexual Activity    Alcohol use: No    Drug use: No    Sexual activity: Yes     Partners: Male   Other Topics Concern    Parent/sibling w/ CABG, MI or angioplasty before 65F 55M? No   Social History Narrative    Not on file     Social Drivers of Health     Financial Resource Strain: Low Risk  (5/3/2024)    Financial Resource Strain     Within the past 12 months, have you or your family members you live with been unable to get utilities (heat, electricity) when it was really needed?: No   Food Insecurity: Low Risk  (5/3/2024)    Food Insecurity     Within the past 12 months,  did you worry that your food would run out before you got money to buy more?: No     Within the past 12 months, did the food you bought just not last and you didn t have money to get more?: No   Transportation Needs: Low Risk  (5/3/2024)    Transportation Needs     Within the past 12 months, has lack of transportation kept you from medical appointments, getting your medicines, non-medical meetings or appointments, work, or from getting things that you need?: No   Physical Activity: Inactive (5/3/2024)    Exercise Vital Sign     Days of Exercise per Week: 0 days     Minutes of Exercise per Session: 0 min   Stress: No Stress Concern Present (5/3/2024)    [place] [employer] - Occupational Stress Questionnaire     Feeling of Stress : Not at all   Social Connections: Unknown (5/3/2024)    Social Connection and Isolation Panel [NHANES]     Frequency of Communication with Friends and Family: Not on file     Frequency of Social Gatherings with Friends and Family: Once a week     Attends Orthodox Services: Not on file     Active Member of Clubs or Organizations: Not on file     Attends Club or Organization Meetings: Not on file     Marital Status: Not on file   Interpersonal Safety: Low Risk  (3/11/2025)    Interpersonal Safety     Do you feel physically and emotionally safe where you currently live?: Yes     Within the past 12 months, have you been hit, slapped, kicked or otherwise physically hurt by someone?: No     Within the past 12 months, have you been humiliated or emotionally abused in other ways by your partner or ex-partner?: No   Housing Stability: Low Risk  (5/3/2024)    Housing Stability     Do you have housing? : Yes     Are you worried about losing your housing?: No       Family History   Problem Relation Age of Onset    Hypertension Mother     Asthma Mother     Ulcerative Colitis Mother     Hypertension Father        Review of Systems:  10 point ROS of systems including Constitutional,  Eyes, Respiratory, Cardiovascular, Gastroenterology, Genitourinary, Integumentary, Muscularskeletal, Psychiatric were all negative except for pertinent positives noted in my HPI and in the PMH.        Exam  /67 (BP Location: Right arm, Cuff Size: Adult Regular)   Pulse 80   LMP 03/04/2025 (Exact Date)   SpO2 98%   General:  WNWD female, NAD  Alert  Oriented x 3  Gait:  Normal  Skin:  Normal skin turgor  HEENT:  NC/AT, EOMI  Abdomen:  Non-tender, non-distended.  Pelvic exam:  Not performed  Extremities:  No clubbing, no cyanosis and no edema.      Assessment  Menorrhagia   Uterine leiomyoma   Pelvic heaviness   Urinary frequency and urgency       Plan  At this time she is interested in, and she has the desires for the plan of the hysterectomy and the bilateral salpingectomy.  She is also interested in the removal of the cervix.  She is aware there may be limitations to performing some of the planned procedures.    She voices understanding of the planned procedures and the risks and benefits and goals and limitations.  She seems to have her questions answered.    I have reviewed the Neurology notes from 03/13/2025 regarding migraines and hysterectomy.   Total time preparing to see patient with reviewing prior encounter and labs, face to face time, coordinating care and documentation, on the same calendar date:  41 minutes     Nico Cottrell MD

## 2025-03-26 ENCOUNTER — TRANSFERRED RECORDS (OUTPATIENT)
Dept: HEALTH INFORMATION MANAGEMENT | Facility: CLINIC | Age: 45
End: 2025-03-26

## 2025-04-02 ENCOUNTER — TELEPHONE (OUTPATIENT)
Dept: OBGYN | Facility: CLINIC | Age: 45
End: 2025-04-02
Payer: COMMERCIAL

## 2025-04-02 NOTE — TELEPHONE ENCOUNTER
Another form from [facility] came. Short term disability physician narrative surgery.    Form filled out and faxed back 4/2/25.  JUANJOSE Burr 4/2/2025       Copy will be kept at [facility]

## 2025-04-25 NOTE — PATIENT INSTRUCTIONS
If you have any questions regarding your visit, Please contact your care team.    To Schedule an Appointment 24/7  Call: [phone]  Women s Health  TELEPHONE NUMBER   Nico Cottrell M.D.    Martha-Medical Assistant    Alia Pollack-Surgery Scheduler  Ling- Tuesday-[place]                   7:30 a.m.-3:30 p.m.  Wednesday-[place]             8:00 a.m.-4:30 p.m.  Thursday-[place]     8:00 a.m.-4:00 p.m.  Friday-[place]                       7:30 a.m.-1:00 p.m. [facility]  [id number] [place] [address].  [place] [address] [phone] Phone  [phone] Fax    Imaging Scheduling all locations  [phone]      [facility]  [address]  [place], [place] [id number] [phone]    [facility]  [address] [place] [id number] [phone] ask for Women's Clinic     **Surgeries** Our Surgery Schedulers will contact you to schedule. If you do not receive a call within 3 business days, please call [phone].    Urgent Care locations:  [facility] Monday-Friday                 10 am - 8 pm  Saturday and Sunday        9 am - 5 pm   [phone]   If you need a medication refill, please contact your pharmacy. Please allow 3 business days for your refill to be completed.  As always, Thank you for trusting us with your healthcare needs!  If you have any questions regarding your visit, Please contact your care team.    [employer] [facility]: [phone]    To Schedule an Appointment 24/7  Call: [phone]    see additional instructions from your care team below

## 2025-05-04 ENCOUNTER — MYC REFILL (OUTPATIENT)
Dept: OBGYN | Facility: CLINIC | Age: 45
End: 2025-05-04
Payer: COMMERCIAL

## 2025-05-04 DIAGNOSIS — L29.2 VULVAR ITCHING: ICD-10-CM

## 2025-05-04 DIAGNOSIS — L30.9 ECZEMA, UNSPECIFIED TYPE: ICD-10-CM

## 2025-05-05 RX ORDER — TRIAMCINOLONE ACETONIDE 1 MG/G
CREAM TOPICAL
Qty: 45 G | Refills: 0 | Status: SHIPPED | OUTPATIENT
Start: 2025-05-05

## 2025-05-05 NOTE — TELEPHONE ENCOUNTER
Requested Prescriptions   Pending Prescriptions Disp Refills    triamcinolone (KENALOG) 0.1 % external cream 45 g 0     Si.5 g to vulva (once a day) every other day.       Topical Steroids and Nonsteroidals Protocol Passed - 2025  3:11 PM        Passed - Patient is age 6 or older        Passed - Authorizing prescriber's most recent note related to this medication read.     If refill request is for ophthalmic use, please forward request to provider for approval.          Passed - High potency steroid not ordered        Passed - Medication is active on med list and the sig matches. RN to manually verify dose and sig if red X/fail.     If the protocol passes (green check), you do not need to verify med dose and sig.    A prescription matches if they are the same clinical intention.    For Example: once daily and every morning are the same.    The protocol can not identify upper and lower case letters as matching and will fail.     For Example: Take 1 tablet (50 mg) by mouth daily     TAKE 1 TABLET (50 MG) BY MOUTH DAILY    For all fails (red x), verify dose and sig.    If the refill does match what is on file, the RN can still proceed to approve the refill request.       If they do not match, route to the appropriate provider.             Passed - Recent (12 mo) or future (90 days) visit within the authorizing provider's specialty     The patient must have completed an in-person or virtual visit within the past 12 months or has a future visit scheduled within the next 90 days with the authorizing provider s specialty.  Urgent care and e-visits do not qualify as an office visit for this protocol.             Prescription approved per [facility] Refill Protocol.    Sara Henriquez RN- OB/GYN group

## 2025-05-07 ENCOUNTER — OFFICE VISIT (OUTPATIENT)
Dept: OBGYN | Facility: CLINIC | Age: 45
End: 2025-05-07
Payer: COMMERCIAL

## 2025-05-07 VITALS
WEIGHT: 159 LBS | DIASTOLIC BLOOD PRESSURE: 69 MMHG | BODY MASS INDEX: 27.72 KG/M2 | OXYGEN SATURATION: 98 % | HEART RATE: 84 BPM | SYSTOLIC BLOOD PRESSURE: 101 MMHG

## 2025-05-07 DIAGNOSIS — Z98.890 POSTOPERATIVE STATE: Primary | ICD-10-CM

## 2025-05-07 PROCEDURE — 3074F SYST BP LT 130 MM HG: CPT | Performed by: OBSTETRICS & GYNECOLOGY

## 2025-05-07 PROCEDURE — 3078F DIAST BP <80 MM HG: CPT | Performed by: OBSTETRICS & GYNECOLOGY

## 2025-05-07 PROCEDURE — 99024 POSTOP FOLLOW-UP VISIT: CPT | Performed by: OBSTETRICS & GYNECOLOGY

## 2025-05-07 NOTE — PROGRESS NOTES
Ethel is a 44 year old , She is 6 weeks s/p GLORY/BS.  Her postop recovery has been uncomplicated.  She is currently requiring no medications for pain management. She has had some pressure sensation but it seems to be improving.  She has still been fatigued with more activity.  She has had some odor, but no discharge or vaginal bleeding.  No hot flashes.  Denies fever, chills.    The pathology report showed:   Uterus, cervix, and bilateral fallopian tubes, hysterectomy and bilateral salpingectomy-  Benign leiomyomata  Proliferative endometrium  Benign cervix  Unremarkable bilateral fallopian tubes    The medical history, social history and family history have been reviewed and updated as indicated.      ROS:   ROS:4 point ROS including Respiratory, CV, GI and , other than that noted in the HPI and the PMH, is negative     PE: /69 (BP Location: Right arm, Patient Position: Sitting, Cuff Size: Adult Regular)   Pulse 84   Wt 72.1 kg (159 lb)   LMP 2025 (Exact Date)   SpO2 98%   BMI 27.72 kg/m    HEENT:  NC/AT, EOMI  Abd: soft, non tender, no masses  Incision: intact, no erythema, induration or discharge  NEFG  Vagina: the vaginal cuff is healing well.  No unusual discharge noted, and no lesions or granulation tissue is seen.        ASSESSMENT:  Post op state    PLAN:  She desires to return to work part time for a couple of weeks as she tires quickly.  I attempted to explain that when she returns to work, she will also tire quickly and that it takes some time for improvement in the fatigue.  Letter for work and return to work paper work completed.   Questions seem to be answered.   Keep annual exams, including vaginal checks.     Nico Cottrell MD

## 2025-05-07 NOTE — LETTER
May 7, 2025      Ethel Murillo  [address]   [place] [place] [address]        To Whom It May Concern:    Ethel Murillo was seen in our clinic today.  She may return to work part time for 2 weeks, until May 21, 2025.  She may return May 22, 2025, without restrictions.      Sincerely,          Nico Cottrell MD

## 2025-05-07 NOTE — Clinical Note
2025    Ethel Murillo   1980        To Whom it May Concern;    Please excuse Ethel Murillo from work/school for a healthcare visit on May 7, 2025.    Sincerely,        Nico Cottrell MD

## 2025-06-27 ENCOUNTER — MEDICAL CORRESPONDENCE (OUTPATIENT)
Dept: HEALTH INFORMATION MANAGEMENT | Facility: CLINIC | Age: 45
End: 2025-06-27
Payer: COMMERCIAL

## 2025-07-03 ENCOUNTER — OFFICE VISIT (OUTPATIENT)
Dept: OPHTHALMOLOGY | Facility: CLINIC | Age: 45
End: 2025-07-03
Payer: COMMERCIAL

## 2025-07-03 DIAGNOSIS — H52.203 MYOPIA WITH ASTIGMATISM AND PRESBYOPIA, BILATERAL: ICD-10-CM

## 2025-07-03 DIAGNOSIS — H52.4 MYOPIA WITH ASTIGMATISM AND PRESBYOPIA, BILATERAL: ICD-10-CM

## 2025-07-03 DIAGNOSIS — G43.109 MIGRAINE WITH AURA: Primary | ICD-10-CM

## 2025-07-03 DIAGNOSIS — H04.123 DRY EYES: ICD-10-CM

## 2025-07-03 DIAGNOSIS — H52.13 MYOPIA WITH ASTIGMATISM AND PRESBYOPIA, BILATERAL: ICD-10-CM

## 2025-07-03 DIAGNOSIS — G51.4 EYELID MYOKYMIA: ICD-10-CM

## 2025-07-03 DIAGNOSIS — H53.143 COMPUTER VISION SYNDROME OF BOTH EYES: ICD-10-CM

## 2025-07-03 PROCEDURE — 99213 OFFICE O/P EST LOW 20 MIN: CPT | Performed by: OPHTHALMOLOGY

## 2025-07-03 ASSESSMENT — REFRACTION_WEARINGRX
OD_AXIS: 100
OD_CYLINDER: +0.25
SPECS_TYPE: READING GLASSES
OS_CYLINDER: +0.50
OS_SPHERE: -4.50
SPECS_TYPE: SVL - DISTANCE
OS_SPHERE: -3.50
OS_AXIS: 006
OD_AXIS: 100
OD_CYLINDER: +0.25
OS_CYLINDER: +0.50
OS_AXIS: 010
OD_SPHERE: -3.25
OD_SPHERE: -4.25

## 2025-07-03 ASSESSMENT — CONF VISUAL FIELD
OD_NORMAL: 1
OD_SUPERIOR_TEMPORAL_RESTRICTION: 0
OD_INFERIOR_TEMPORAL_RESTRICTION: 0
OS_INFERIOR_NASAL_RESTRICTION: 0
OD_SUPERIOR_NASAL_RESTRICTION: 0
OS_NORMAL: 1
OS_INFERIOR_TEMPORAL_RESTRICTION: 0
OS_SUPERIOR_NASAL_RESTRICTION: 0
METHOD: COUNTING FINGERS
OD_INFERIOR_NASAL_RESTRICTION: 0
OS_SUPERIOR_TEMPORAL_RESTRICTION: 0

## 2025-07-03 ASSESSMENT — VISUAL ACUITY
CORRECTION_TYPE: GLASSES
OD_CC+: -1
METHOD: SNELLEN - LINEAR
OD_CC: 20/20
METHOD_MR: R/G BALANCED
OS_CC: 20/20
OS_CC+: -1

## 2025-07-03 ASSESSMENT — REFRACTION
OD_AXIS: 105
OS_AXIS: 015
OD_SPHERE: -4.50
OS_CYLINDER: +0.50
OS_SPHERE: -4.75
OD_CYLINDER: +0.25

## 2025-07-03 ASSESSMENT — TONOMETRY
OD_IOP_MMHG: 13
OS_IOP_MMHG: 13
IOP_METHOD: ICARE

## 2025-07-03 NOTE — PATIENT INSTRUCTIONS
Please try glasses with FL-41 tint     Please use polarized glasses in sunglasses    Please take breaks every 20-30 minutes when doing intense near visual tasks: using computer devices, reading, sewing, knitting, etc.     Please remind yourself to blink more often.     Please increase the use of artificial tears during those activities.    Humidifiers would help as well.     Please stay away from fans since they will dry your eyes even more.      Lid hygiene instructions     Please apply:   Warm compresses to eyelids for about 5-10 minutes 2 times per day  After compresses please wash your eyelid with warm water while eyelids are closed  You may use over-the-counter eyelid scrubs   You may apply eye ointment or lubricating eye gel/tears after that   Eyelid Twitch: Care Instructions  Overview  An eyelid twitch is a muscle spasm in your eyelid that you cannot control. Sometimes the eyelid closes or nearly closes and then opens again. You may have problems with one or both eyes. You may also be sensitive to bright light.  Your eyelid muscles may twitch because you are tired or stressed. Drinking beverages with caffeine can also cause eyelid twitches. These twitches may bother you off and on for several days. This type of eyelid twitch is common and can be very annoying. Often, the eyelid twitch goes away while you sleep and starts again when you are awake. Most people do not even notice when the twitch stops.  Your doctor may not be able to find a cause for your eyelid twitching. If your eyelid twitching is severe, you may consider getting botulinum toxin (Botox) injections.  Follow-up care is a key part of your treatment and safety. Be sure to make and go to all appointments, and call your doctor if you are having problems. It's also a good idea to know your test results and keep a list of the medicines you take.  How can you care for yourself at home?  Get more sleep, which can help relieve eyelid twitches.  Drink  less caffeine. It can cause muscle spasms in your eyes.  Use eyedrops to keep your eyes moist.  When should you call for help?  Watch closely for changes in your health, and be sure to contact your doctor if:    The twitching in your eyelid lasts longer than 1 week.     You begin to have twitches in other parts of your face.     You have redness or swelling of your eye.     You have fluid leaking from your eye.     Your eyelid closes completely.     You do not get better as expected.   Current as of: July 31, 2024  Content Version: 14.5    [id number] [email or web address].   Care instructions adapted under license by your healthcare professional. If you have questions about a medical condition or this instruction, always ask your healthcare professional. [email or web address] disclaims any warranty or liability for your use of this information.

## 2025-07-03 NOTE — PROGRESS NOTES
HPI       Ocular Migraine Evaluation    In both eyes.  Associated symptoms include glare, dryness, photophobia, itching, foreign body sensation and burning.  Negative for eye pain and flashes.  Pain was noted as 0/10. Additional comments: New Pt here for ocular migraine.             Comments    Pt has been getting ocular migraines for about 2-3 ears.  Light sensitivity, some tearing at random times with burning, bothered by fluorescent lights, glare, and wavy lines with ocular migraine.  Intermittent blurriness outside peripheral.  No flashes.  No change to floaters, RE>LE.  No ocular meds.  No DM.    NATALIYA Chavez July 3, 2025 7:13 AM              Last edited by Melvina Brandt COT on 7/3/2025  7:13 AM.        Dr. Alanis exam    History taken by staff was reviewed and verified.    44 year old female.    Additional HPI:        Location:  each eye    Signs/symptoms:  Technicians note reviewed and verified. Agree with history upon current presentation.  Eyes water. Not using artificial tears. Pt has been seen by a neurologist for migraines. Pt states she was advised to take Ibuprofen prn. No diplopia, no photopsia or floaters. Migraine symptoms 3-4 times per year.    Severity:  Mild   Duration:  Chronic   Quality:  fluctuating    Lab Results   Component Value Date    A1C 4.9 05/10/2024        Related System Review:  No recent changes in general health.  A 10-system review was conducted and was negative except for what's noted in the HPI. The following systems were reviewed: Constitutional, cardiovascular, respiratory, gastrointestinal, genitourinary, musculoskeletal, neurologic, psychiatric, endocrinological, and hematological.    Patient Active Problem List    Diagnosis Date Noted    Iron deficiency anemia due to chronic blood loss 11/28/2017     Priority: High     Class: Chronic    Cervical os stenosis 09/14/2023     Priority: Medium    Abnormal uterine bleeding (AUB) 09/14/2023     Priority: Medium     Female infertility of other specified origin 10/20/2014     Priority: Medium     Subtle male factor (low sperm morphology)      Moderate dysplasia of cervix (ROSAURA II) 11/09/2012     Priority: Medium     11/9/12 LSIL. Plan colp  11/21/12 Colpo bx ROSAURA 1/2. Plan cryotherapy  12/12/12 cryotherapy performed. Repeat pap 3 months.    3/29/13 NIL. Repeat pap 3 months.  6/25/13 NIL. Repeat pap 6 months.   11/26/13 NIL, + HR HPV 31. Repeat cotest in 1 year.   1/7/15 NIL, + HR HPV. Plan colp  2/12/15 [place] Bx & ECC - Negative. Plan cotest in 1 year.  1/19/16 NIL, Neg HPV. Plan cotest in 1 year.   1/23/17 NIL pap/Neg HPV: plan: routine screening  12/17/19 NIL pap, Neg HPV.  Plan cotest in 3 years.   6/10/22 NIL pap, neg HPV. Cotest every 3 years  2/12/25 NIL pap, neg HPV. Plan cotest in 3 years. Planning hyst.      Fibroid uterus 11/09/2012     Priority: Medium    Dysmenorrhea 11/09/2012     Priority: Medium       Family History   Problem Relation Age of Onset    Hypertension Mother     Asthma Mother     Ulcerative Colitis Mother     Hypertension Father     Glaucoma No family hx of     Macular Degeneration No family hx of        Current Outpatient Medications   Medication Sig Dispense Refill    triamcinolone (KENALOG) 0.1 % external cream 0.5 g to vulva (once a day) every other day. 45 g 0    SUMAtriptan (IMITREX) 25 MG tablet Take 1 tablet (25 mg) by mouth at onset of headache for migraine May repeat in 2 hours. Max 8 tablets/24 hours. 10 tablet 1    vitamin D3 (CHOLECALCIFEROL) 2000 units tablet Take 1 tablet by mouth daily       No current facility-administered medications for this visit.       New Social History:  originally from [place]; states she is [place]  Working at the  in the office at [facility]    Social History     Socioeconomic History    Marital status:      Spouse name: Not on file    Number of children: 0    Years of education: Not on file    Highest education level: Not on file   Occupational History      Employer: UNEMPLOYED   Tobacco Use    Smoking status: Never     Passive exposure: Never    Smokeless tobacco: Never   Vaping Use    Vaping status: Never Used   Substance and Sexual Activity    Alcohol use: No    Drug use: No    Sexual activity: Yes     Partners: Male   Other Topics Concern    Parent/sibling w/ CABG, MI or angioplasty before 65F 55M? No   Social History Narrative    Not on file     Social Drivers of Health     Financial Resource Strain: Low Risk  (5/3/2024)    Financial Resource Strain     Within the past 12 months, have you or your family members you live with been unable to get utilities (heat, electricity) when it was really needed?: No   Food Insecurity: No Food Insecurity (3/26/2025)    Received from [facility]     Hunger Vital Sign     Worried About Running Out of Food in the Last Year: Never true     Ran Out of Food in the Last Year: Never true   Transportation Needs: No Transportation Needs (3/26/2025)    Received from [facility]     PRAPARE - Transportation     Lack of Transportation (Medical): No     Lack of Transportation (Non-Medical): No   Physical Activity: Inactive (5/3/2024)    Exercise Vital Sign     Days of Exercise per Week: 0 days     Minutes of Exercise per Session: 0 min   Stress: No Stress Concern Present (5/3/2024)    [employer] [place] [employer] - Occupational Stress Questionnaire     Feeling of Stress : Not at all   Social Connections: Unknown (5/3/2024)    Social Connection and Isolation Panel [NHANES]     Frequency of Communication with Friends and Family: Not on file     Frequency of Social Gatherings with Friends and Family: Once a week     Attends Zoroastrian Services: Not on file     Active Member of Clubs or Organizations: Not on file     Attends Club or Organization Meetings: Not on file     Marital Status: Not on file   Interpersonal Safety: Patient Unable To Answer (3/26/2025)    Received from [facility]      Humiliation, Afraid, Rape, and Kick questionnaire     Fear of Current or Ex-Partner: Patient unable to answer     Emotionally Abused: Patient unable to answer     Physically Abused: Patient unable to answer     Sexually Abused: Patient unable to answer   Housing Stability: Low Risk  (3/26/2025)    Received from [facility]     Housing Stability Vital Sign     Unable to Pay for Housing in the Last Year: No     Number of Times Moved in the Last Year: 0     Homeless in the Last Year: No         Constitutional:    Appears well   Mental Status:  oriented to person, place, time  Affect:  appropriate    Confrontation VF:  FTFC OU  External:  Normal  EOM:  Full  Alignment:  Normal  Pupils:  Equal, reactive with no rAPD    Slit Lamp Exam:      IOP:  OD: 13  OS: 13  (mmHg by GAT at 7:24 AM on  7/3/2025)     Right: Lids/Adnexa:  Normal    Lacrimal: Normal    Conjunctiva/Sclera:  Normal    Cornea:  Normal    Ant. Chamber: Deep and quiet    Iris: Normal    Lens: normal       Left: Lids/Adnexa: Normal    Lacrimal: Normal    Conjunctiva/Sclera: Normal    Cornea: Normal    Ant. Chamber: Deep and Quiet    Iris: Normal    Lens: normal    Dilated each eye 7/3/2025       Media:       Right: 20/20   Left: 20/20    Vitreous:     Right: clear   Left:   clear    Fundi:       Right: Disc: C/D: 0.25; no pallor or edema    Vessels:  Normal    Periphery:  Normal; no holes/tears/detachments    Macula:  Normal     Left: Disc: C/D: 0.2; no pallor or edema    Vessels:  Normal    Periphery:  Normal; no holes/tears/detachments     Macula:  Normal    Assessment:       ICD-10-CM    1. Migraine with aura  G43.109 Adult Eye  Referral      2. Dry eyes  H04.123       3. Computer vision syndrome of both eyes  H53.143       4. Myopia with astigmatism and presbyopia, bilateral  H52.13     H52.203     H52.4       5. Eyelid myokymia  G51.4         Plan:     Rx for glasses with FL-41 tint  Polarized lenses in glasses  Discussed eyelid  myokymia  Art tears bid or more prn   Environmental modifications  Advised to continue to follow with neurologist for medications prn     Follow in 1 year    I personally spent great than 60 minutes spent on the date of the encounter doing chart review, history and exam, discussion with the patient, documentation, and further activities as noted above. We discussed the complexity of their diagnosis, the need for further information, close monitoring, continued management, and the unknown prognosis for the patient at this time.    Dulce Alanis MD    Patient education:   No apparent learning barriers were identified.  Explained diagnosis and treatment plan.  Instructions provided.  Patient expressed understanding of the content.    Attending Physician Attestation:  Complete documentation of historical and exam elements from today's encounter can be found in the full encounter summary report (not reduplicated in this progress note).  I personally obtained the chief complaint(s) and history of present illness.  I confirmed and edited as necessary the review of systems, past medical/surgical history, family history, social history, and examination findings as documented by others; and I examined the patient myself.  I personally reviewed the relevant tests, images, and reports as documented above.  I formulated and edited as necessary the assessment and plan and discussed the findings and management plan with the patient and family. - MD Dulce Ly MD ....................  7/3/2025   7:41 AM

## 2025-07-08 ENCOUNTER — ANCILLARY PROCEDURE (OUTPATIENT)
Dept: MAMMOGRAPHY | Facility: CLINIC | Age: 45
End: 2025-07-08
Attending: FAMILY MEDICINE
Payer: COMMERCIAL

## 2025-07-08 DIAGNOSIS — Z12.31 VISIT FOR SCREENING MAMMOGRAM: ICD-10-CM

## 2025-07-08 PROCEDURE — 77063 BREAST TOMOSYNTHESIS BI: CPT | Mod: TC | Performed by: RADIOLOGY

## 2025-07-08 PROCEDURE — 77067 SCR MAMMO BI INCL CAD: CPT | Mod: TC | Performed by: RADIOLOGY

## 2025-08-12 ENCOUNTER — MYC MEDICAL ADVICE (OUTPATIENT)
Dept: OBGYN | Facility: CLINIC | Age: 45
End: 2025-08-12
Payer: COMMERCIAL

## 2025-08-12 ENCOUNTER — MYC REFILL (OUTPATIENT)
Dept: OBGYN | Facility: CLINIC | Age: 45
End: 2025-08-12
Payer: COMMERCIAL

## 2025-08-12 DIAGNOSIS — L30.9 ECZEMA, UNSPECIFIED TYPE: ICD-10-CM

## 2025-08-12 DIAGNOSIS — L29.2 VULVAR ITCHING: ICD-10-CM

## 2025-08-14 ENCOUNTER — OFFICE VISIT (OUTPATIENT)
Dept: CARDIOLOGY | Facility: CLINIC | Age: 45
End: 2025-08-14
Payer: COMMERCIAL

## 2025-08-14 VITALS
SYSTOLIC BLOOD PRESSURE: 100 MMHG | WEIGHT: 158 LBS | DIASTOLIC BLOOD PRESSURE: 68 MMHG | BODY MASS INDEX: 27.55 KG/M2 | OXYGEN SATURATION: 96 % | HEART RATE: 81 BPM

## 2025-08-14 DIAGNOSIS — R00.2 PALPITATIONS: Primary | ICD-10-CM

## 2025-08-14 LAB
ATRIAL RATE - MUSE: 83 BPM
DIASTOLIC BLOOD PRESSURE - MUSE: NORMAL MMHG
INTERPRETATION ECG - MUSE: NORMAL
P AXIS - MUSE: 25 DEGREES
PR INTERVAL - MUSE: 142 MS
QRS DURATION - MUSE: 78 MS
QT - MUSE: 378 MS
QTC - MUSE: 444 MS
R AXIS - MUSE: 90 DEGREES
SYSTOLIC BLOOD PRESSURE - MUSE: NORMAL MMHG
T AXIS - MUSE: 48 DEGREES
VENTRICULAR RATE- MUSE: 83 BPM

## 2025-08-14 RX ORDER — TRIAMCINOLONE ACETONIDE 1 MG/G
CREAM TOPICAL
Qty: 80 G | Refills: 1 | Status: SHIPPED | OUTPATIENT
Start: 2025-08-14

## 2025-09-02 LAB — CV ZIO PRELIM RESULTS: NORMAL

## (undated) DEVICE — SUCTION CANISTER DOLPHIN 3000ML

## (undated) DEVICE — DRAPE GYN/UROLOGY FLUID POUCH TUR 29455

## (undated) DEVICE — PAD PERI W/WINGS 1580A

## (undated) DEVICE — GLOVE BIOGEL PI ULTRATOUCH G SZ 7.5 42175

## (undated) DEVICE — PACK MINOR SBA15MIFSE

## (undated) DEVICE — DRSG TELFA 3X8" 1238

## (undated) DEVICE — PREP SKIN SCRUB TRAY 4461A

## (undated) DEVICE — SOL GLYCINE 1.5% 3000ML BAG 2B7317

## (undated) DEVICE — SEAL SET MYOSURE ROD LENS SCOPE SINGLE USE 40-902

## (undated) DEVICE — KIT PROCEDURE FLUENT IN/OUT FLOWPAK TISS TRAP FLT-112S

## (undated) DEVICE — SOL WATER IRRIG 1000ML BOTTLE 07139-09

## (undated) RX ORDER — FENTANYL CITRATE 50 UG/ML
INJECTION, SOLUTION INTRAMUSCULAR; INTRAVENOUS
Status: DISPENSED
Start: 2023-11-16

## (undated) RX ORDER — PROPOFOL 10 MG/ML
INJECTION, EMULSION INTRAVENOUS
Status: DISPENSED
Start: 2023-11-16

## (undated) RX ORDER — KETOROLAC TROMETHAMINE 30 MG/ML
INJECTION, SOLUTION INTRAMUSCULAR; INTRAVENOUS
Status: DISPENSED
Start: 2023-11-16

## (undated) RX ORDER — ACETAMINOPHEN 325 MG/1
TABLET ORAL
Status: DISPENSED
Start: 2023-11-16